# Patient Record
Sex: MALE | Race: BLACK OR AFRICAN AMERICAN | Employment: OTHER | URBAN - METROPOLITAN AREA
[De-identification: names, ages, dates, MRNs, and addresses within clinical notes are randomized per-mention and may not be internally consistent; named-entity substitution may affect disease eponyms.]

---

## 2017-06-03 ENCOUNTER — HOSPITAL ENCOUNTER (EMERGENCY)
Age: 67
Discharge: HOME | End: 2017-06-03
Attending: EMERGENCY MEDICINE
Payer: MEDICARE

## 2017-06-03 ENCOUNTER — APPOINTMENT (OUTPATIENT)
Dept: GENERAL RADIOLOGY | Age: 67
End: 2017-06-03
Attending: EMERGENCY MEDICINE
Payer: MEDICARE

## 2017-06-03 VITALS
BODY MASS INDEX: 34.13 KG/M2 | HEART RATE: 74 BPM | DIASTOLIC BLOOD PRESSURE: 55 MMHG | OXYGEN SATURATION: 94 % | SYSTOLIC BLOOD PRESSURE: 109 MMHG | RESPIRATION RATE: 16 BRPM | WEIGHT: 252 LBS | HEIGHT: 72 IN | TEMPERATURE: 98.3 F

## 2017-06-03 DIAGNOSIS — R07.89 CHEST WALL PAIN: Primary | ICD-10-CM

## 2017-06-03 LAB
ANION GAP BLD CALC-SCNC: 12 MMOL/L (ref 7–16)
BASOPHILS # BLD AUTO: 0 K/UL (ref 0–0.2)
BASOPHILS # BLD: 0 % (ref 0–2)
BUN SERPL-MCNC: 18 MG/DL (ref 8–23)
CALCIUM SERPL-MCNC: 9.1 MG/DL (ref 8.3–10.4)
CHLORIDE SERPL-SCNC: 99 MMOL/L (ref 98–107)
CO2 SERPL-SCNC: 27 MMOL/L (ref 21–32)
CREAT SERPL-MCNC: 1.31 MG/DL (ref 0.8–1.5)
DIFFERENTIAL METHOD BLD: ABNORMAL
EOSINOPHIL # BLD: 0.3 K/UL (ref 0–0.8)
EOSINOPHIL NFR BLD: 6 % (ref 0.5–7.8)
ERYTHROCYTE [DISTWIDTH] IN BLOOD BY AUTOMATED COUNT: 13.2 % (ref 11.9–14.6)
GLUCOSE SERPL-MCNC: 316 MG/DL (ref 65–100)
HCT VFR BLD AUTO: 41.5 % (ref 41.1–50.3)
HGB BLD-MCNC: 14.1 G/DL (ref 13.6–17.2)
IMM GRANULOCYTES # BLD: 0 K/UL (ref 0–0.5)
IMM GRANULOCYTES NFR BLD AUTO: 0.4 % (ref 0–5)
LYMPHOCYTES # BLD AUTO: 33 % (ref 13–44)
LYMPHOCYTES # BLD: 1.6 K/UL (ref 0.5–4.6)
MCH RBC QN AUTO: 28.7 PG (ref 26.1–32.9)
MCHC RBC AUTO-ENTMCNC: 34 G/DL (ref 31.4–35)
MCV RBC AUTO: 84.5 FL (ref 79.6–97.8)
MONOCYTES # BLD: 0.5 K/UL (ref 0.1–1.3)
MONOCYTES NFR BLD AUTO: 11 % (ref 4–12)
NEUTS SEG # BLD: 2.5 K/UL (ref 1.7–8.2)
NEUTS SEG NFR BLD AUTO: 50 % (ref 43–78)
PLATELET # BLD AUTO: 230 K/UL (ref 150–450)
PMV BLD AUTO: 9.9 FL (ref 10.8–14.1)
POTASSIUM SERPL-SCNC: 4.1 MMOL/L (ref 3.5–5.1)
RBC # BLD AUTO: 4.91 M/UL (ref 4.23–5.67)
SODIUM SERPL-SCNC: 138 MMOL/L (ref 136–145)
TROPONIN I BLD-MCNC: 0.02 NG/ML (ref 0–0.08)
TROPONIN I SERPL-MCNC: <0.04 NG/ML (ref 0.02–0.05)
WBC # BLD AUTO: 5 K/UL (ref 4.3–11.1)

## 2017-06-03 PROCEDURE — 84484 ASSAY OF TROPONIN QUANT: CPT

## 2017-06-03 PROCEDURE — 71020 XR CHEST PA LAT: CPT

## 2017-06-03 PROCEDURE — 80048 BASIC METABOLIC PNL TOTAL CA: CPT

## 2017-06-03 PROCEDURE — 93005 ELECTROCARDIOGRAM TRACING: CPT | Performed by: EMERGENCY MEDICINE

## 2017-06-03 PROCEDURE — 85025 COMPLETE CBC W/AUTO DIFF WBC: CPT

## 2017-06-03 PROCEDURE — 99284 EMERGENCY DEPT VISIT MOD MDM: CPT | Performed by: EMERGENCY MEDICINE

## 2017-06-03 RX ORDER — METFORMIN HYDROCHLORIDE 850 MG/1
850 TABLET ORAL 2 TIMES DAILY WITH MEALS
COMMUNITY

## 2017-06-03 NOTE — ED TRIAGE NOTES
Pt states that he has had right sided chest pain, intermittently for 1 week. States that he recently started lifting weights. Took ASA 324mg PTA.   Hx of CAD

## 2017-06-03 NOTE — ED PROVIDER NOTES
HPI Comments: 57-year-old male presents to the ER complaining of one week of brief episodes of sharp pain in his right chest it is often worse with movement and certain positions. He states he recently started lifting weights again. He denies any shortness of breath or diaphoresis. He denies any radiation of the pain. He states he did have a cardiac stent placed at [facility] many years ago but he does not follow-up with a cardiologist.  He did have routine appointment with his primary care physician a few days ago but he did not mention this pain to him. Patient is a 79 y.o. male presenting with chest pain. The history is provided by the patient and the spouse. Chest Pain (Angina)    This is a new problem. The current episode started more than 2 days ago. The problem has not changed since onset. The problem occurs daily. The pain is associated with movement. The pain is present in the right side. The pain is mild. The quality of the pain is described as sharp. The pain does not radiate. The symptoms are aggravated by movement. Pertinent negatives include no diaphoresis, no dizziness, no headaches, no malaise/fatigue, no nausea and no shortness of breath. He has tried nothing for the symptoms. Risk factors include cardiac disease, diabetes mellitus and hypertension. His past medical history is significant for DM and HTN. Procedural history includes cardiac catheterization and cardiac stents. Past Medical History:   Diagnosis Date    CAD (coronary artery disease)     GERD (gastroesophageal reflux disease)     HTN (hypertension)     Hypercholesteremia     Prediabetes     no meds       Past Surgical History:   Procedure Laterality Date    HX KNEE ARTHROSCOPY      to L    HX PTCA  2000    stents x 2    HX SHOULDER ARTHROSCOPY      to L         History reviewed. No pertinent family history.     Social History     Social History    Marital status:      Spouse name: N/A    Number of children: N/A    Years of education: N/A     Occupational History    Not on file. Social History Main Topics    Smoking status: Never Smoker    Smokeless tobacco: Not on file    Alcohol use No    Drug use: No    Sexual activity: Not on file     Other Topics Concern    Not on file     Social History Narrative         ALLERGIES: Review of patient's allergies indicates no known allergies. Review of Systems   Constitutional: Negative for diaphoresis and malaise/fatigue. HENT: Negative. Eyes: Negative. Respiratory: Negative for shortness of breath. Cardiovascular: Positive for chest pain. Gastrointestinal: Negative for nausea. Endocrine: Negative. Genitourinary: Negative. Musculoskeletal: Negative. Skin: Negative. Neurological: Negative for dizziness and headaches. Vitals:    06/03/17 1952   BP: 149/73   Pulse: 77   Resp: 16   Temp: 98.3 °F (36.8 °C)   SpO2: 95%   Weight: 114.3 kg (252 lb)   Height: 6' (1.829 m)            Physical Exam   Constitutional: He is oriented to person, place, and time. He appears well-developed and well-nourished. HENT:   Head: Normocephalic and atraumatic. Eyes: EOM are normal. Pupils are equal, round, and reactive to light. Neck: Normal range of motion. Neck supple. Cardiovascular: Normal rate and regular rhythm. Pulmonary/Chest: Effort normal and breath sounds normal. No respiratory distress. He exhibits no tenderness. Abdominal: Soft. Bowel sounds are normal.   Musculoskeletal: Normal range of motion. He exhibits no edema or tenderness. Neurological: He is alert and oriented to person, place, and time. Skin: Skin is warm and dry. Nursing note and vitals reviewed. MDM  Number of Diagnoses or Management Options  Diagnosis management comments: 71-year-old male presents with episodic right chest pain for the past one week.   No pain currently in the emergency department    Differential diagnosis includes chest wall pain, costochondritis, pneumothorax, angina, myocardial infarction, arrhythmia, reflux    EKG shows normal sinus rhythm with first-degree AV block. No acute ischemic changes       Amount and/or Complexity of Data Reviewed  Clinical lab tests: ordered and reviewed  Tests in the radiology section of CPT®: ordered and reviewed  Review and summarize past medical records: yes    Risk of Complications, Morbidity, and/or Mortality  Presenting problems: moderate  Diagnostic procedures: moderate  Management options: low    Patient Progress  Patient progress: stable    ED Course   9:01 PM  EKG shows no acute ischemic changes and a troponin value is negative. I see no sign of acute coronary syndrome. All of his episodes of pain are very brief and on the right side of his chest.  They have been occurring for at least 1 week. It is worse with movement and clearly sounds musculoskeletal.  He is reassured with this workup and will follow up with his doctor. Voice dictation software was used during the making of this note. This software is not perfect and grammatical and other typographical errors may be present. This note has been proofread, but may still contain errors.   Dashawn Estevez MD; 6/3/2017 @9:02 PM   ===================================================================        Procedures

## 2017-06-04 LAB
ATRIAL RATE: 61 BPM
CALCULATED P AXIS, ECG09: 56 DEGREES
CALCULATED R AXIS, ECG10: 60 DEGREES
CALCULATED T AXIS, ECG11: 95 DEGREES
DIAGNOSIS, 93000: NORMAL
P-R INTERVAL, ECG05: 228 MS
Q-T INTERVAL, ECG07: 402 MS
QRS DURATION, ECG06: 88 MS
QTC CALCULATION (BEZET), ECG08: 404 MS
VENTRICULAR RATE, ECG03: 61 BPM

## 2017-06-04 NOTE — ED NOTES
I have reviewed discharge instructions with the patient. The patient verbalized understanding.  Pt states that he feels better and is ready to go home

## 2017-06-04 NOTE — DISCHARGE INSTRUCTIONS
Musculoskeletal Chest Pain: Care Instructions  Your Care Instructions  Chest pain is not always a sign that something is wrong with your heart or that you have another serious problem. The doctor thinks your chest pain is caused by strained muscles or ligaments, inflamed chest cartilage, or another problem in your chest, rather than by your heart. You may need more tests to find the cause of your chest pain. Follow-up care is a key part of your treatment and safety. Be sure to make and go to all appointments, and call your doctor if you are having problems. Its also a good idea to know your test results and keep a list of the medicines you take. How can you care for yourself at home? · Take pain medicines exactly as directed. ¨ If the doctor gave you a prescription medicine for pain, take it as prescribed. ¨ If you are not taking a prescription pain medicine, ask your doctor if you can take an over-the-counter medicine. · Rest and protect the sore area. · Stop, change, or take a break from any activity that may be causing your pain or soreness. · Put ice or a cold pack on the sore area for 10 to 20 minutes at a time. Try to do this every 1 to 2 hours for the next 3 days (when you are awake) or until the swelling goes down. Put a thin cloth between the ice and your skin. · After 2 or 3 days, apply a heating pad set on low or a warm cloth to the area that hurts. Some doctors suggest that you go back and forth between hot and cold. · Do not wrap or tape your ribs for support. This may cause you to take smaller breaths, which could increase your risk of lung problems. · Mentholated creams such as Bengay or Icy Hot may soothe sore muscles. Follow the instructions on the package. · Follow your doctor's instructions for exercising. · Gentle stretching and massage may help you get better faster. Stretch slowly to the point just before pain begins, and hold the stretch for at least 15 to 30 seconds.  Do this 3 or 4 times a day. Stretch just after you have applied heat. · As your pain gets better, slowly return to your normal activities. Any increased pain may be a sign that you need to rest a while longer. When should you call for help? Call 911 anytime you think you may need emergency care. For example, call if:  · You have chest pain or pressure. This may occur with:  ¨ Sweating. ¨ Shortness of breath. ¨ Nausea or vomiting. ¨ Pain that spreads from the chest to the neck, jaw, or one or both shoulders or arms. ¨ Dizziness or lightheadedness. ¨ A fast or uneven pulse. After calling 911, chew 1 adult-strength aspirin. Wait for an ambulance. Do not try to drive yourself. · You have sudden chest pain and shortness of breath, or you cough up blood. Call your doctor now or seek immediate medical care if:  · You have any trouble breathing. · Your chest pain gets worse. · Your chest pain occurs consistently with exercise and is relieved by rest.  Watch closely for changes in your health, and be sure to contact your doctor if:  · Your chest pain does not get better after 1 week. Where can you learn more? Go to http://afua-kailey.info/. Enter V293 in the search box to learn more about \"Musculoskeletal Chest Pain: Care Instructions. \"  Current as of: May 27, 2016  Content Version: 11.2  © 7379-5617 [employer]. Care instructions adapted under license by [employer] (which disclaims liability or warranty for this information). If you have questions about a medical condition or this instruction, always ask your healthcare professional. [place] [address] any warranty or liability for your use of this information.

## 2021-09-23 PROCEDURE — B246ZZ4 ULTRASONOGRAPHY OF RIGHT AND LEFT HEART, TRANSESOPHAGEAL: ICD-10-PCS | Performed by: STUDENT IN AN ORGANIZED HEALTH CARE EDUCATION/TRAINING PROGRAM

## 2021-09-24 ENCOUNTER — APPOINTMENT (OUTPATIENT)
Dept: GENERAL RADIOLOGY | Age: 71
DRG: 233 | End: 2021-09-24
Attending: EMERGENCY MEDICINE
Payer: MEDICARE

## 2021-09-24 ENCOUNTER — APPOINTMENT (OUTPATIENT)
Dept: NON INVASIVE DIAGNOSTICS | Age: 71
DRG: 233 | End: 2021-09-24
Attending: NURSE PRACTITIONER
Payer: MEDICARE

## 2021-09-24 ENCOUNTER — HOSPITAL ENCOUNTER (INPATIENT)
Age: 71
LOS: 5 days | Discharge: HOME HEALTH | DRG: 233 | End: 2021-09-29
Attending: INTERNAL MEDICINE | Admitting: THORACIC SURGERY (CARDIOTHORACIC VASCULAR SURGERY)
Payer: MEDICARE

## 2021-09-24 ENCOUNTER — ANESTHESIA EVENT (OUTPATIENT)
Dept: SURGERY | Age: 71
DRG: 233 | End: 2021-09-24
Payer: MEDICARE

## 2021-09-24 ENCOUNTER — HOSPITAL ENCOUNTER (EMERGENCY)
Age: 71
Discharge: ANOTHER HOSPITAL | DRG: 233 | End: 2021-09-24
Attending: EMERGENCY MEDICINE
Payer: MEDICARE

## 2021-09-24 ENCOUNTER — APPOINTMENT (OUTPATIENT)
Dept: GENERAL RADIOLOGY | Age: 71
DRG: 233 | End: 2021-09-24
Attending: THORACIC SURGERY (CARDIOTHORACIC VASCULAR SURGERY)
Payer: MEDICARE

## 2021-09-24 ENCOUNTER — ANESTHESIA (OUTPATIENT)
Dept: SURGERY | Age: 71
DRG: 233 | End: 2021-09-24
Payer: MEDICARE

## 2021-09-24 VITALS
HEART RATE: 94 BPM | RESPIRATION RATE: 23 BRPM | TEMPERATURE: 99.1 F | DIASTOLIC BLOOD PRESSURE: 71 MMHG | WEIGHT: 250 LBS | SYSTOLIC BLOOD PRESSURE: 133 MMHG | BODY MASS INDEX: 33.86 KG/M2 | OXYGEN SATURATION: 97 % | HEIGHT: 72 IN

## 2021-09-24 DIAGNOSIS — J81.0 ACUTE PULMONARY EDEMA (HCC): ICD-10-CM

## 2021-09-24 DIAGNOSIS — I21.4 NON Q WAVE MYOCARDIAL INFARCTION (HCC): Primary | ICD-10-CM

## 2021-09-24 DIAGNOSIS — Z95.1 S/P CABG X 2: ICD-10-CM

## 2021-09-24 DIAGNOSIS — Z79.4 TYPE 2 DIABETES MELLITUS WITHOUT COMPLICATION, WITH LONG-TERM CURRENT USE OF INSULIN (HCC): Chronic | ICD-10-CM

## 2021-09-24 DIAGNOSIS — R09.02 HYPOXEMIA: ICD-10-CM

## 2021-09-24 DIAGNOSIS — Z99.11 ENCOUNTER FOR WEANING FROM VENTILATOR (HCC): ICD-10-CM

## 2021-09-24 DIAGNOSIS — I21.4 NSTEMI (NON-ST ELEVATED MYOCARDIAL INFARCTION) (HCC): ICD-10-CM

## 2021-09-24 DIAGNOSIS — E11.9 TYPE 2 DIABETES MELLITUS WITHOUT COMPLICATION, WITH LONG-TERM CURRENT USE OF INSULIN (HCC): Chronic | ICD-10-CM

## 2021-09-24 DIAGNOSIS — I10 ESSENTIAL HYPERTENSION: Chronic | ICD-10-CM

## 2021-09-24 PROBLEM — I25.10 CAD (CORONARY ARTERY DISEASE): Status: ACTIVE | Noted: 2021-09-24

## 2021-09-24 PROBLEM — I25.10 CORONARY ARTERY DISEASE INVOLVING NATIVE CORONARY ARTERY: Chronic | Status: ACTIVE | Noted: 2021-09-24

## 2021-09-24 LAB
ALBUMIN SERPL-MCNC: 3.7 G/DL (ref 3.2–4.6)
ALBUMIN/GLOB SERPL: 1 {RATIO} (ref 1.2–3.5)
ALP SERPL-CCNC: 106 U/L (ref 50–136)
ALT SERPL-CCNC: 30 U/L (ref 12–65)
ANION GAP SERPL CALC-SCNC: 8 MMOL/L (ref 7–16)
ANION GAP SERPL CALC-SCNC: 8 MMOL/L (ref 7–16)
APTT PPP: 29 SEC (ref 24.1–35.1)
APTT PPP: 29.4 SEC (ref 24.1–35.1)
ARTERIAL PATENCY WRIST A: ABNORMAL
ARTERIAL PATENCY WRIST A: NORMAL
AST SERPL-CCNC: 29 U/L (ref 15–37)
ATRIAL RATE: 97 BPM
BASE DEFICIT BLD-SCNC: 0.1 MMOL/L
BASE DEFICIT BLD-SCNC: 0.1 MMOL/L
BASE DEFICIT BLD-SCNC: 1.4 MMOL/L
BASE DEFICIT BLD-SCNC: 1.6 MMOL/L
BASE DEFICIT BLD-SCNC: 1.8 MMOL/L
BASE DEFICIT BLD-SCNC: 2.2 MMOL/L
BASE EXCESS BLD CALC-SCNC: 0.1 MMOL/L
BASOPHILS # BLD: 0 K/UL (ref 0–0.2)
BASOPHILS NFR BLD: 0 % (ref 0–2)
BDY SITE: ABNORMAL
BDY SITE: NORMAL
BILIRUB SERPL-MCNC: 0.4 MG/DL (ref 0.2–1.1)
BNP SERPL-MCNC: 526 PG/ML (ref 5–125)
BUN SERPL-MCNC: 13 MG/DL (ref 8–23)
BUN SERPL-MCNC: 18 MG/DL (ref 8–23)
CA-I BLD-MCNC: 1.12 MMOL/L (ref 1.12–1.32)
CA-I BLD-MCNC: 1.17 MMOL/L (ref 1.12–1.32)
CA-I BLD-MCNC: 1.18 MMOL/L (ref 1.12–1.32)
CA-I BLD-MCNC: 1.24 MMOL/L (ref 1.12–1.32)
CA-I BLD-MCNC: 1.32 MMOL/L (ref 1.12–1.32)
CALCIUM SERPL-MCNC: 8 MG/DL (ref 8.3–10.4)
CALCIUM SERPL-MCNC: 8.9 MG/DL (ref 8.3–10.4)
CALCULATED P AXIS, ECG09: 75 DEGREES
CALCULATED R AXIS, ECG10: 54 DEGREES
CALCULATED T AXIS, ECG11: -114 DEGREES
CHLORIDE SERPL-SCNC: 103 MMOL/L (ref 98–107)
CHLORIDE SERPL-SCNC: 114 MMOL/L (ref 98–107)
CHOLEST SERPL-MCNC: 92 MG/DL
CITRATED FUNCTIONAL FIBRINOGEN MAXIMUM AMPLITUDE: 16.2 MM (ref 15–32)
CITRATED FUNCTIONAL FIBRINOGEN MAXIMUM AMPLITUDE: 21.5 MM (ref 15–32)
CITRATED KAOLIN ANGLE: 72.5 DEG (ref 63–78)
CITRATED KAOLIN ANGLE: 76.4 DEG (ref 63–78)
CITRATED KAOLIN K-TIME: 1 MINS (ref 0.8–2.1)
CITRATED KAOLIN K-TIME: 1.5 MINS (ref 0.8–2.1)
CITRATED KAOLIN MAXIMUM AMPLITUDE: 54 MM (ref 52–69)
CITRATED KAOLIN MAXIMUM AMPLITUDE: 62.8 MM (ref 52–69)
CITRATED KAOLIN R-TIME: 5.3 MINS (ref 4.6–9.1)
CITRATED KAOLIN R-TIME: 6.2 MINS (ref 4.6–9.1)
CITRATED KAOLIN/HEPARINASE R-TIME: 5.2 MINS (ref 4.3–8.3)
CITRATED KAOLIN/HEPARINASE R-TIME: 6 MINS (ref 4.3–8.3)
CITRATED RAPIDTEG MAXIMUM AMPLITUDE: 51.8 MM (ref 52–70)
CITRATED RAPIDTEG MAXIMUM AMPLITUDE: 63.6 MM (ref 52–70)
CO2 BLD-SCNC: 24 MMOL/L (ref 13–23)
CO2 BLD-SCNC: 24 MMOL/L (ref 13–23)
CO2 BLD-SCNC: 25 MMOL/L (ref 13–23)
CO2 BLD-SCNC: 26 MMOL/L (ref 13–23)
CO2 BLD-SCNC: 26 MMOL/L (ref 13–23)
CO2 SERPL-SCNC: 25 MMOL/L (ref 21–32)
CO2 SERPL-SCNC: 27 MMOL/L (ref 21–32)
CREAT SERPL-MCNC: 0.76 MG/DL (ref 0.8–1.5)
CREAT SERPL-MCNC: 1.01 MG/DL (ref 0.8–1.5)
DIAGNOSIS, 93000: NORMAL
DIFFERENTIAL METHOD BLD: ABNORMAL
ECHO AO ASC DIAM: 3.6 CM
ECHO AO ROOT DIAM: 3.5 CM
ECHO AV AREA PEAK VELOCITY: 1.32 CM2
ECHO AV AREA VTI: 1.43 CM2
ECHO AV AREA/BSA PEAK VELOCITY: 0.6 CM2/M2
ECHO AV AREA/BSA VTI: 0.6 CM2/M2
ECHO AV MEAN GRADIENT: 11 MMHG
ECHO AV PEAK GRADIENT: 20 MMHG
ECHO AV PEAK VELOCITY: 222 CM/S
ECHO AV VTI: 44.7 CM
ECHO EST RA PRESSURE: 8 MMHG
ECHO LA AREA 2C: 28.6 CM2
ECHO LA AREA 4C: 33.5 CM2
ECHO LA MAJOR AXIS: 6.72 CM
ECHO LA MINOR AXIS: 2.87 CM
ECHO LV E' LATERAL VELOCITY: 8.9 CM/S
ECHO LV E' SEPTAL VELOCITY: 7.74 CM/S
ECHO LV EDV A2C: 182 CM3
ECHO LV EDV A4C: 189 CM3
ECHO LV ESV A2C: 91.5 CM3
ECHO LV ESV A4C: 93.5 CM3
ECHO LV INTERNAL DIMENSION DIASTOLIC: 5.17 CM (ref 4.2–5.9)
ECHO LV INTERNAL DIMENSION SYSTOLIC: 3.38 CM
ECHO LV IVSD: 1.1 CM (ref 0.6–1)
ECHO LV MASS 2D: 214.6 G (ref 88–224)
ECHO LV MASS INDEX 2D: 91.7 G/M2 (ref 49–115)
ECHO LV POSTERIOR WALL DIASTOLIC: 1.07 CM (ref 0.6–1)
ECHO LVOT DIAM: 1.9 CM
ECHO LVOT PEAK GRADIENT: 4 MMHG
ECHO LVOT VTI: 22.5 CM
ECHO MV A VELOCITY: 52.9 CM/S
ECHO MV E VELOCITY: 89.7 CM/S
ECHO MV E/A RATIO: 1.7
ECHO MV E/E' LATERAL: 10.08
ECHO MV E/E' RATIO (AVERAGED): 10.83
ECHO MV E/E' SEPTAL: 11.59
ECHO MV REGURGITANT RADIUS PISA: 0.3 CM
ECHO MV REGURGITANT VTIA: 148 CM
ECHO RIGHT VENTRICULAR SYSTOLIC PRESSURE (RVSP): 44 MMHG
ECHO RV TAPSE: 2.49 CM (ref 1.5–2)
ECHO TV REGURGITANT MAX VELOCITY: 299 CM/S
ECHO TV REGURGITANT PEAK GRADIENT: 36 MMHG
EOSINOPHIL # BLD: 0.2 K/UL (ref 0–0.8)
EOSINOPHIL NFR BLD: 4 % (ref 0.5–7.8)
ERYTHROCYTE [DISTWIDTH] IN BLOOD BY AUTOMATED COUNT: 13.9 % (ref 11.9–14.6)
ERYTHROCYTE [DISTWIDTH] IN BLOOD BY AUTOMATED COUNT: 14.1 % (ref 11.9–14.6)
EST. AVERAGE GLUCOSE BLD GHB EST-MCNC: 214 MG/DL
FIBRINOGEN PPP-MCNC: 237 MG/DL (ref 190–501)
FUNCTIONAL FIBRINOGEN LEVEL: 295.6 MG/DL (ref 278–581)
FUNCTIONAL FIBRINOGEN LEVEL: 392.3 MG/DL (ref 278–581)
GAS FLOW.O2 O2 DELIVERY SYS: ABNORMAL L/MIN
GAS FLOW.O2 O2 DELIVERY SYS: NORMAL L/MIN
GLOBULIN SER CALC-MCNC: 3.7 G/DL (ref 2.3–3.5)
GLUCOSE BLD STRIP.AUTO-MCNC: 133 MG/DL (ref 65–100)
GLUCOSE BLD STRIP.AUTO-MCNC: 134 MG/DL (ref 65–100)
GLUCOSE BLD STRIP.AUTO-MCNC: 138 MG/DL (ref 65–100)
GLUCOSE BLD STRIP.AUTO-MCNC: 151 MG/DL (ref 65–100)
GLUCOSE BLD STRIP.AUTO-MCNC: 152 MG/DL (ref 65–100)
GLUCOSE BLD STRIP.AUTO-MCNC: 156 MG/DL (ref 65–100)
GLUCOSE BLD STRIP.AUTO-MCNC: 157 MG/DL (ref 65–100)
GLUCOSE BLD STRIP.AUTO-MCNC: 159 MG/DL (ref 65–100)
GLUCOSE BLD STRIP.AUTO-MCNC: 170 MG/DL (ref 65–100)
GLUCOSE BLD STRIP.AUTO-MCNC: 181 MG/DL (ref 65–100)
GLUCOSE BLD STRIP.AUTO-MCNC: 186 MG/DL (ref 65–100)
GLUCOSE BLD STRIP.AUTO-MCNC: 191 MG/DL (ref 65–100)
GLUCOSE BLD STRIP.AUTO-MCNC: 246 MG/DL (ref 65–100)
GLUCOSE SERPL-MCNC: 161 MG/DL (ref 65–100)
GLUCOSE SERPL-MCNC: 257 MG/DL (ref 65–100)
HBA1C MFR BLD: 9.1 % (ref 4.2–6.3)
HCO3 BLD-SCNC: 22.8 MMOL/L (ref 22–26)
HCO3 BLD-SCNC: 23.5 MMOL/L (ref 22–26)
HCO3 BLD-SCNC: 23.8 MMOL/L (ref 22–26)
HCO3 BLD-SCNC: 24.6 MMOL/L (ref 22–26)
HCO3 BLD-SCNC: 24.6 MMOL/L (ref 22–26)
HCO3 BLD-SCNC: 25 MMOL/L (ref 22–26)
HCO3 BLD-SCNC: 25.5 MMOL/L (ref 22–26)
HCT VFR BLD AUTO: 31.9 % (ref 41.1–50.3)
HCT VFR BLD AUTO: 32.1 % (ref 41.1–50.3)
HCT VFR BLD AUTO: 36.1 % (ref 41.1–50.3)
HDLC SERPL-MCNC: 33 MG/DL (ref 40–60)
HDLC SERPL: 2.8 {RATIO}
HGB BLD-MCNC: 10.5 G/DL (ref 13.6–17.2)
HGB BLD-MCNC: 10.6 G/DL (ref 13.6–17.2)
HGB BLD-MCNC: 12.2 G/DL (ref 13.6–17.2)
HISTORY CHECKED?,CKHIST: NORMAL
IMM GRANULOCYTES # BLD AUTO: 0 K/UL (ref 0–0.5)
IMM GRANULOCYTES NFR BLD AUTO: 0 % (ref 0–5)
INR PPP: 0.9
INR PPP: 1.3
LDLC SERPL CALC-MCNC: 35.6 MG/DL
LIPASE SERPL-CCNC: 115 U/L (ref 73–393)
LYMPHOCYTES # BLD: 1.1 K/UL (ref 0.5–4.6)
LYMPHOCYTES NFR BLD: 21 % (ref 13–44)
MAGNESIUM SERPL-MCNC: 1.9 MG/DL (ref 1.8–2.4)
MAGNESIUM SERPL-MCNC: 2.5 MG/DL (ref 1.8–2.4)
MAGNESIUM SERPL-MCNC: 2.7 MG/DL (ref 1.8–2.4)
MCH RBC QN AUTO: 29.2 PG (ref 26.1–32.9)
MCH RBC QN AUTO: 29.3 PG (ref 26.1–32.9)
MCHC RBC AUTO-ENTMCNC: 32.9 G/DL (ref 31.4–35)
MCHC RBC AUTO-ENTMCNC: 33.8 G/DL (ref 31.4–35)
MCV RBC AUTO: 86.8 FL (ref 79.6–97.8)
MCV RBC AUTO: 88.9 FL (ref 79.6–97.8)
MONOCYTES # BLD: 0.7 K/UL (ref 0.1–1.3)
MONOCYTES NFR BLD: 12 % (ref 4–12)
NEUTS SEG # BLD: 3.4 K/UL (ref 1.7–8.2)
NEUTS SEG NFR BLD: 63 % (ref 43–78)
NRBC # BLD: 0 K/UL (ref 0–0.2)
NRBC # BLD: 0 K/UL (ref 0–0.2)
O2/TOTAL GAS SETTING VFR VENT: 30 %
O2/TOTAL GAS SETTING VFR VENT: 60 %
P-R INTERVAL, ECG05: 216 MS
PCO2 BLD: 38.9 MMHG (ref 35–45)
PCO2 BLD: 38.9 MMHG (ref 35–45)
PCO2 BLD: 40 MMHG (ref 35–45)
PCO2 BLD: 41.1 MMHG (ref 35–45)
PCO2 BLD: 42.2 MMHG (ref 35–45)
PCO2 BLD: 44.3 MMHG (ref 35–45)
PCO2 BLD: 48.7 MMHG (ref 35–45)
PEEP RESPIRATORY: 8 CMH2O
PEEP RESPIRATORY: 8 CMH2O
PH BLD: 7.31 [PH] (ref 7.35–7.45)
PH BLD: 7.37 [PH] (ref 7.35–7.45)
PH BLD: 7.37 [PH] (ref 7.35–7.45)
PH BLD: 7.38 [PH] (ref 7.35–7.45)
PH BLD: 7.41 [PH] (ref 7.35–7.45)
PLATELET # BLD AUTO: 117 K/UL (ref 150–450)
PLATELET # BLD AUTO: 199 K/UL (ref 150–450)
PMV BLD AUTO: 9.9 FL (ref 9.4–12.3)
PMV BLD AUTO: 9.9 FL (ref 9.4–12.3)
PO2 BLD: 219 MMHG (ref 75–100)
PO2 BLD: 310 MMHG (ref 75–100)
PO2 BLD: 342 MMHG (ref 75–100)
PO2 BLD: 377 MMHG (ref 75–100)
PO2 BLD: 85 MMHG (ref 75–100)
PO2 BLD: 85 MMHG (ref 75–100)
PO2 BLD: 90 MMHG (ref 75–100)
POTASSIUM BLD-SCNC: 3.6 MMOL/L (ref 3.5–5.1)
POTASSIUM BLD-SCNC: 3.9 MMOL/L (ref 3.5–5.1)
POTASSIUM BLD-SCNC: 4 MMOL/L (ref 3.5–5.1)
POTASSIUM BLD-SCNC: 4 MMOL/L (ref 3.5–5.1)
POTASSIUM BLD-SCNC: 4.5 MMOL/L (ref 3.5–5.1)
POTASSIUM SERPL-SCNC: 3.7 MMOL/L (ref 3.5–5.1)
POTASSIUM SERPL-SCNC: 3.9 MMOL/L (ref 3.5–5.1)
POTASSIUM SERPL-SCNC: 4.3 MMOL/L (ref 3.5–5.1)
PRESSURE SUPPORT SETTING VENT: 8 CMH2O
PROT SERPL-MCNC: 7.4 G/DL (ref 6.3–8.2)
PROTHROMBIN TIME: 13.1 SEC (ref 12.6–14.5)
PROTHROMBIN TIME: 16.1 SEC (ref 12.6–14.5)
Q-T INTERVAL, ECG07: 336 MS
QRS DURATION, ECG06: 92 MS
QTC CALCULATION (BEZET), ECG08: 426 MS
RBC # BLD AUTO: 3.59 M/UL (ref 4.23–5.6)
RBC # BLD AUTO: 4.16 M/UL (ref 4.23–5.6)
SAO2 % BLD: 100 %
SAO2 % BLD: 95.3 % (ref 95–98)
SAO2 % BLD: 96.1 % (ref 95–98)
SAO2 % BLD: 97 %
SERVICE CMNT-IMP: ABNORMAL
SERVICE CMNT-IMP: NORMAL
SERVICE CMNT-IMP: NORMAL
SODIUM BLD-SCNC: 139 MMOL/L (ref 136–145)
SODIUM BLD-SCNC: 140 MMOL/L (ref 136–145)
SODIUM BLD-SCNC: 141 MMOL/L (ref 136–145)
SODIUM BLD-SCNC: 142 MMOL/L (ref 136–145)
SODIUM BLD-SCNC: 143 MMOL/L (ref 136–145)
SODIUM SERPL-SCNC: 138 MMOL/L (ref 136–145)
SODIUM SERPL-SCNC: 147 MMOL/L (ref 136–145)
SPECIMEN SITE: ABNORMAL
SPECIMEN TYPE: ABNORMAL
SPECIMEN TYPE: NORMAL
TRIGL SERPL-MCNC: 117 MG/DL (ref 35–150)
TROPONIN-HIGH SENSITIVITY: 1259.7 PG/ML (ref 0–14)
TROPONIN-HIGH SENSITIVITY: 1460.6 PG/ML (ref 0–14)
TROPONIN-HIGH SENSITIVITY: 1821.5 PG/ML (ref 0–14)
UFH PPP CHRO-ACNC: <0.1 IU/ML (ref 0.3–0.7)
VENTILATION MODE VENT: ABNORMAL
VENTILATION MODE VENT: NORMAL
VENTRICULAR RATE, ECG03: 97 BPM
VLDLC SERPL CALC-MCNC: 23.4 MG/DL (ref 6–23)
VT SETTING VENT: 450 ML
WBC # BLD AUTO: 5.4 K/UL (ref 4.3–11.1)
WBC # BLD AUTO: 9.6 K/UL (ref 4.3–11.1)

## 2021-09-24 PROCEDURE — 77030013794 HC KT TRNSDUC BLD EDWD -B: Performed by: INTERNAL MEDICINE

## 2021-09-24 PROCEDURE — C1729 CATH, DRAINAGE: HCPCS | Performed by: THORACIC SURGERY (CARDIOTHORACIC VASCULAR SURGERY)

## 2021-09-24 PROCEDURE — 85384 FIBRINOGEN ACTIVITY: CPT

## 2021-09-24 PROCEDURE — 82947 ASSAY GLUCOSE BLOOD QUANT: CPT

## 2021-09-24 PROCEDURE — 83735 ASSAY OF MAGNESIUM: CPT

## 2021-09-24 PROCEDURE — 77030010818: Performed by: THORACIC SURGERY (CARDIOTHORACIC VASCULAR SURGERY)

## 2021-09-24 PROCEDURE — 82962 GLUCOSE BLOOD TEST: CPT

## 2021-09-24 PROCEDURE — 99152 MOD SED SAME PHYS/QHP 5/>YRS: CPT | Performed by: INTERNAL MEDICINE

## 2021-09-24 PROCEDURE — C1894 INTRO/SHEATH, NON-LASER: HCPCS | Performed by: INTERNAL MEDICINE

## 2021-09-24 PROCEDURE — C1769 GUIDE WIRE: HCPCS | Performed by: THORACIC SURGERY (CARDIOTHORACIC VASCULAR SURGERY)

## 2021-09-24 PROCEDURE — 77030012390 HC DRN CHST BTL GTNG -B: Performed by: THORACIC SURGERY (CARDIOTHORACIC VASCULAR SURGERY)

## 2021-09-24 PROCEDURE — 86923 COMPATIBILITY TEST ELECTRIC: CPT

## 2021-09-24 PROCEDURE — 74011000258 HC RX REV CODE- 258: Performed by: THORACIC SURGERY (CARDIOTHORACIC VASCULAR SURGERY)

## 2021-09-24 PROCEDURE — C8929 TTE W OR WO FOL WCON,DOPPLER: HCPCS

## 2021-09-24 PROCEDURE — 85730 THROMBOPLASTIN TIME PARTIAL: CPT

## 2021-09-24 PROCEDURE — 77030018571 HC SUT PROL1 J&J -B: Performed by: THORACIC SURGERY (CARDIOTHORACIC VASCULAR SURGERY)

## 2021-09-24 PROCEDURE — 65620000000 HC RM CCU GENERAL

## 2021-09-24 PROCEDURE — 77030003010 HC SUT SURG STL J&J -B: Performed by: THORACIC SURGERY (CARDIOTHORACIC VASCULAR SURGERY)

## 2021-09-24 PROCEDURE — 021009W BYPASS CORONARY ARTERY, ONE ARTERY FROM AORTA WITH AUTOLOGOUS VENOUS TISSUE, OPEN APPROACH: ICD-10-PCS | Performed by: THORACIC SURGERY (CARDIOTHORACIC VASCULAR SURGERY)

## 2021-09-24 PROCEDURE — 03HY32Z INSERTION OF MONITORING DEVICE INTO UPPER ARTERY, PERCUTANEOUS APPROACH: ICD-10-PCS | Performed by: STUDENT IN AN ORGANIZED HEALTH CARE EDUCATION/TRAINING PROGRAM

## 2021-09-24 PROCEDURE — 93458 L HRT ARTERY/VENTRICLE ANGIO: CPT | Performed by: INTERNAL MEDICINE

## 2021-09-24 PROCEDURE — 74011000250 HC RX REV CODE- 250: Performed by: THORACIC SURGERY (CARDIOTHORACIC VASCULAR SURGERY)

## 2021-09-24 PROCEDURE — 71045 X-RAY EXAM CHEST 1 VIEW: CPT

## 2021-09-24 PROCEDURE — 77030018548 HC SUT ETHBND2 J&J -B: Performed by: THORACIC SURGERY (CARDIOTHORACIC VASCULAR SURGERY)

## 2021-09-24 PROCEDURE — 77030010813: Performed by: THORACIC SURGERY (CARDIOTHORACIC VASCULAR SURGERY)

## 2021-09-24 PROCEDURE — 77030013797 HC KT TRNSDUC PRSSR EDWD -A: Performed by: THORACIC SURGERY (CARDIOTHORACIC VASCULAR SURGERY)

## 2021-09-24 PROCEDURE — 93005 ELECTROCARDIOGRAM TRACING: CPT | Performed by: EMERGENCY MEDICINE

## 2021-09-24 PROCEDURE — 77030018729 HC ELECTRD DEFIB PAD CARD -B: Performed by: THORACIC SURGERY (CARDIOTHORACIC VASCULAR SURGERY)

## 2021-09-24 PROCEDURE — 74011250637 HC RX REV CODE- 250/637: Performed by: INTERNAL MEDICINE

## 2021-09-24 PROCEDURE — 77030039425 HC BLD LARYNG TRULITE DISP TELE -A: Performed by: STUDENT IN AN ORGANIZED HEALTH CARE EDUCATION/TRAINING PROGRAM

## 2021-09-24 PROCEDURE — 77030015766: Performed by: INTERNAL MEDICINE

## 2021-09-24 PROCEDURE — 82330 ASSAY OF CALCIUM: CPT

## 2021-09-24 PROCEDURE — 96375 TX/PRO/DX INJ NEW DRUG ADDON: CPT

## 2021-09-24 PROCEDURE — 74011250636 HC RX REV CODE- 250/636: Performed by: NURSE ANESTHETIST, CERTIFIED REGISTERED

## 2021-09-24 PROCEDURE — 80053 COMPREHEN METABOLIC PANEL: CPT

## 2021-09-24 PROCEDURE — 02100Z9 BYPASS CORONARY ARTERY, ONE ARTERY FROM LEFT INTERNAL MAMMARY, OPEN APPROACH: ICD-10-PCS | Performed by: THORACIC SURGERY (CARDIOTHORACIC VASCULAR SURGERY)

## 2021-09-24 PROCEDURE — 77030008771 HC TU NG SALEM SUMP -A: Performed by: STUDENT IN AN ORGANIZED HEALTH CARE EDUCATION/TRAINING PROGRAM

## 2021-09-24 PROCEDURE — 77030002987 HC SUT PROL J&J -B: Performed by: THORACIC SURGERY (CARDIOTHORACIC VASCULAR SURGERY)

## 2021-09-24 PROCEDURE — 85027 COMPLETE CBC AUTOMATED: CPT

## 2021-09-24 PROCEDURE — 77030034888 HC SUT PROL 2 J&J -B: Performed by: THORACIC SURGERY (CARDIOTHORACIC VASCULAR SURGERY)

## 2021-09-24 PROCEDURE — 74011636637 HC RX REV CODE- 636/637: Performed by: THORACIC SURGERY (CARDIOTHORACIC VASCULAR SURGERY)

## 2021-09-24 PROCEDURE — 74011250636 HC RX REV CODE- 250/636: Performed by: NURSE PRACTITIONER

## 2021-09-24 PROCEDURE — 83880 ASSAY OF NATRIURETIC PEPTIDE: CPT

## 2021-09-24 PROCEDURE — 77030002888 HC SUT CHRMC J&J -A: Performed by: INTERNAL MEDICINE

## 2021-09-24 PROCEDURE — 77030018834: Performed by: THORACIC SURGERY (CARDIOTHORACIC VASCULAR SURGERY)

## 2021-09-24 PROCEDURE — 77030022953: Performed by: THORACIC SURGERY (CARDIOTHORACIC VASCULAR SURGERY)

## 2021-09-24 PROCEDURE — P9047 ALBUMIN (HUMAN), 25%, 50ML: HCPCS

## 2021-09-24 PROCEDURE — 77030020751 HC FLTR TBNG TRNSFUS HAEM -A: Performed by: THORACIC SURGERY (CARDIOTHORACIC VASCULAR SURGERY)

## 2021-09-24 PROCEDURE — 86901 BLOOD TYPING SEROLOGIC RH(D): CPT

## 2021-09-24 PROCEDURE — 80048 BASIC METABOLIC PNL TOTAL CA: CPT

## 2021-09-24 PROCEDURE — 74011000250 HC RX REV CODE- 250: Performed by: NURSE ANESTHETIST, CERTIFIED REGISTERED

## 2021-09-24 PROCEDURE — 82803 BLOOD GASES ANY COMBINATION: CPT

## 2021-09-24 PROCEDURE — C1751 CATH, INF, PER/CENT/MIDLINE: HCPCS | Performed by: STUDENT IN AN ORGANIZED HEALTH CARE EDUCATION/TRAINING PROGRAM

## 2021-09-24 PROCEDURE — 77030002970 HC SUT PLEDG TELE -A: Performed by: THORACIC SURGERY (CARDIOTHORACIC VASCULAR SURGERY)

## 2021-09-24 PROCEDURE — 99223 1ST HOSP IP/OBS HIGH 75: CPT | Performed by: INTERNAL MEDICINE

## 2021-09-24 PROCEDURE — 85025 COMPLETE CBC W/AUTO DIFF WBC: CPT

## 2021-09-24 PROCEDURE — 77030025827 HC BG BLD DNR AUTLG MEDT -A: Performed by: THORACIC SURGERY (CARDIOTHORACIC VASCULAR SURGERY)

## 2021-09-24 PROCEDURE — 74011000250 HC RX REV CODE- 250: Performed by: INTERNAL MEDICINE

## 2021-09-24 PROCEDURE — 77030025646 HC AUTOTRNSFUS KT TERU -C: Performed by: THORACIC SURGERY (CARDIOTHORACIC VASCULAR SURGERY)

## 2021-09-24 PROCEDURE — 77030031139 HC SUT VCRL2 J&J -A: Performed by: THORACIC SURGERY (CARDIOTHORACIC VASCULAR SURGERY)

## 2021-09-24 PROCEDURE — 83690 ASSAY OF LIPASE: CPT

## 2021-09-24 PROCEDURE — 2709999900 HC NON-CHARGEABLE SUPPLY: Performed by: THORACIC SURGERY (CARDIOTHORACIC VASCULAR SURGERY)

## 2021-09-24 PROCEDURE — 74011250636 HC RX REV CODE- 250/636

## 2021-09-24 PROCEDURE — 77030013794 HC KT TRNSDUC BLD EDWD -B: Performed by: STUDENT IN AN ORGANIZED HEALTH CARE EDUCATION/TRAINING PROGRAM

## 2021-09-24 PROCEDURE — 77030018673: Performed by: THORACIC SURGERY (CARDIOTHORACIC VASCULAR SURGERY)

## 2021-09-24 PROCEDURE — 77030002986 HC SUT PROL J&J -A: Performed by: THORACIC SURGERY (CARDIOTHORACIC VASCULAR SURGERY)

## 2021-09-24 PROCEDURE — 77030013292 HC BOWL MX PRSM J&J -A: Performed by: STUDENT IN AN ORGANIZED HEALTH CARE EDUCATION/TRAINING PROGRAM

## 2021-09-24 PROCEDURE — 77030019908 HC STETH ESOPH SIMS -A: Performed by: STUDENT IN AN ORGANIZED HEALTH CARE EDUCATION/TRAINING PROGRAM

## 2021-09-24 PROCEDURE — 4A023N7 MEASUREMENT OF CARDIAC SAMPLING AND PRESSURE, LEFT HEART, PERCUTANEOUS APPROACH: ICD-10-PCS | Performed by: INTERNAL MEDICINE

## 2021-09-24 PROCEDURE — 77030030163 HC BN WAX J&J -A: Performed by: THORACIC SURGERY (CARDIOTHORACIC VASCULAR SURGERY)

## 2021-09-24 PROCEDURE — 77030018547 HC SUT ETHBND1 J&J -B: Performed by: THORACIC SURGERY (CARDIOTHORACIC VASCULAR SURGERY)

## 2021-09-24 PROCEDURE — 74011000636 HC RX REV CODE- 636: Performed by: INTERNAL MEDICINE

## 2021-09-24 PROCEDURE — 74011250636 HC RX REV CODE- 250/636: Performed by: EMERGENCY MEDICINE

## 2021-09-24 PROCEDURE — 77030013861 HC PNCH AORT CLNCUT QUES -B: Performed by: THORACIC SURGERY (CARDIOTHORACIC VASCULAR SURGERY)

## 2021-09-24 PROCEDURE — 74011636637 HC RX REV CODE- 636/637: Performed by: NURSE PRACTITIONER

## 2021-09-24 PROCEDURE — 77030016564 HC BLD STRNL SAW4 CNMD -B: Performed by: THORACIC SURGERY (CARDIOTHORACIC VASCULAR SURGERY)

## 2021-09-24 PROCEDURE — 76010000198 HC CV SURG 5 TO 5.5 HR INTENSV-TIER 1: Performed by: THORACIC SURGERY (CARDIOTHORACIC VASCULAR SURGERY)

## 2021-09-24 PROCEDURE — C1769 GUIDE WIRE: HCPCS | Performed by: INTERNAL MEDICINE

## 2021-09-24 PROCEDURE — 36600 WITHDRAWAL OF ARTERIAL BLOOD: CPT

## 2021-09-24 PROCEDURE — 96366 THER/PROPH/DIAG IV INF ADDON: CPT

## 2021-09-24 PROCEDURE — B2111ZZ FLUOROSCOPY OF MULTIPLE CORONARY ARTERIES USING LOW OSMOLAR CONTRAST: ICD-10-PCS | Performed by: INTERNAL MEDICINE

## 2021-09-24 PROCEDURE — 85610 PROTHROMBIN TIME: CPT

## 2021-09-24 PROCEDURE — 65610000006 HC RM INTENSIVE CARE

## 2021-09-24 PROCEDURE — 77030013839 HC OCCL INT FLRST BAXT -B: Performed by: THORACIC SURGERY (CARDIOTHORACIC VASCULAR SURGERY)

## 2021-09-24 PROCEDURE — 77030037088 HC TUBE ENDOTRACH ORAL NSL COVD-A: Performed by: STUDENT IN AN ORGANIZED HEALTH CARE EDUCATION/TRAINING PROGRAM

## 2021-09-24 PROCEDURE — 96365 THER/PROPH/DIAG IV INF INIT: CPT

## 2021-09-24 PROCEDURE — 74011250637 HC RX REV CODE- 250/637: Performed by: EMERGENCY MEDICINE

## 2021-09-24 PROCEDURE — 76060000041 HC ANESTHESIA 5 TO 5.5 HR: Performed by: THORACIC SURGERY (CARDIOTHORACIC VASCULAR SURGERY)

## 2021-09-24 PROCEDURE — 36415 COLL VENOUS BLD VENIPUNCTURE: CPT

## 2021-09-24 PROCEDURE — 77030022199 HC SYS HARV VESL GTNG -G1: Performed by: THORACIC SURGERY (CARDIOTHORACIC VASCULAR SURGERY)

## 2021-09-24 PROCEDURE — 77030020407 HC IV BLD WRMR ST 3M -A: Performed by: STUDENT IN AN ORGANIZED HEALTH CARE EDUCATION/TRAINING PROGRAM

## 2021-09-24 PROCEDURE — 94002 VENT MGMT INPAT INIT DAY: CPT

## 2021-09-24 PROCEDURE — 80061 LIPID PANEL: CPT

## 2021-09-24 PROCEDURE — 77030016699 HC CATH ANGI DX INFN1 CARD -A: Performed by: INTERNAL MEDICINE

## 2021-09-24 PROCEDURE — 74011000258 HC RX REV CODE- 258: Performed by: NURSE ANESTHETIST, CERTIFIED REGISTERED

## 2021-09-24 PROCEDURE — 93005 ELECTROCARDIOGRAM TRACING: CPT | Performed by: THORACIC SURGERY (CARDIOTHORACIC VASCULAR SURGERY)

## 2021-09-24 PROCEDURE — 86580 TB INTRADERMAL TEST: CPT | Performed by: THORACIC SURGERY (CARDIOTHORACIC VASCULAR SURGERY)

## 2021-09-24 PROCEDURE — 77030006690 HC BLD OPHTH BVR BD -B: Performed by: THORACIC SURGERY (CARDIOTHORACIC VASCULAR SURGERY)

## 2021-09-24 PROCEDURE — 02HV33Z INSERTION OF INFUSION DEVICE INTO SUPERIOR VENA CAVA, PERCUTANEOUS APPROACH: ICD-10-PCS | Performed by: STUDENT IN AN ORGANIZED HEALTH CARE EDUCATION/TRAINING PROGRAM

## 2021-09-24 PROCEDURE — 77030002520 HC INSRT CLMP LATIS STLTH AMR -B: Performed by: THORACIC SURGERY (CARDIOTHORACIC VASCULAR SURGERY)

## 2021-09-24 PROCEDURE — 85018 HEMOGLOBIN: CPT

## 2021-09-24 PROCEDURE — 74011000272 HC RX REV CODE- 272: Performed by: THORACIC SURGERY (CARDIOTHORACIC VASCULAR SURGERY)

## 2021-09-24 PROCEDURE — 84295 ASSAY OF SERUM SODIUM: CPT

## 2021-09-24 PROCEDURE — 74011636637 HC RX REV CODE- 636/637: Performed by: NURSE ANESTHETIST, CERTIFIED REGISTERED

## 2021-09-24 PROCEDURE — 74011250637 HC RX REV CODE- 250/637: Performed by: THORACIC SURGERY (CARDIOTHORACIC VASCULAR SURGERY)

## 2021-09-24 PROCEDURE — 84484 ASSAY OF TROPONIN QUANT: CPT

## 2021-09-24 PROCEDURE — 74011250636 HC RX REV CODE- 250/636: Performed by: INTERNAL MEDICINE

## 2021-09-24 PROCEDURE — 74011000250 HC RX REV CODE- 250

## 2021-09-24 PROCEDURE — 77030005518 HC CATH URETH FOL 2W BARD -B: Performed by: THORACIC SURGERY (CARDIOTHORACIC VASCULAR SURGERY)

## 2021-09-24 PROCEDURE — 77030010512 HC APPL CLP LIG J&J -C: Performed by: THORACIC SURGERY (CARDIOTHORACIC VASCULAR SURGERY)

## 2021-09-24 PROCEDURE — 77030006824 HC BLD SAW SAG CNMD -B: Performed by: THORACIC SURGERY (CARDIOTHORACIC VASCULAR SURGERY)

## 2021-09-24 PROCEDURE — 99285 EMERGENCY DEPT VISIT HI MDM: CPT

## 2021-09-24 PROCEDURE — 84132 ASSAY OF SERUM POTASSIUM: CPT

## 2021-09-24 PROCEDURE — 74011250636 HC RX REV CODE- 250/636: Performed by: THORACIC SURGERY (CARDIOTHORACIC VASCULAR SURGERY)

## 2021-09-24 PROCEDURE — 74011250637 HC RX REV CODE- 250/637: Performed by: NURSE PRACTITIONER

## 2021-09-24 PROCEDURE — 77030020230: Performed by: STUDENT IN AN ORGANIZED HEALTH CARE EDUCATION/TRAINING PROGRAM

## 2021-09-24 PROCEDURE — 06BQ4ZZ EXCISION OF LEFT SAPHENOUS VEIN, PERCUTANEOUS ENDOSCOPIC APPROACH: ICD-10-PCS | Performed by: THORACIC SURGERY (CARDIOTHORACIC VASCULAR SURGERY)

## 2021-09-24 PROCEDURE — 77030009355 HC CRDPLG DEL SET QUES -C: Performed by: THORACIC SURGERY (CARDIOTHORACIC VASCULAR SURGERY)

## 2021-09-24 PROCEDURE — 85520 HEPARIN ASSAY: CPT

## 2021-09-24 PROCEDURE — 77030011264 HC ELECTRD BLD EXT COVD -A: Performed by: THORACIC SURGERY (CARDIOTHORACIC VASCULAR SURGERY)

## 2021-09-24 PROCEDURE — 77030020751 HC FLTR TBNG TRNSFUS HAEM -A: Performed by: STUDENT IN AN ORGANIZED HEALTH CARE EDUCATION/TRAINING PROGRAM

## 2021-09-24 PROCEDURE — 77030002912 HC SUT ETHBND J&J -A: Performed by: THORACIC SURGERY (CARDIOTHORACIC VASCULAR SURGERY)

## 2021-09-24 PROCEDURE — 83036 HEMOGLOBIN GLYCOSYLATED A1C: CPT

## 2021-09-24 PROCEDURE — 85347 COAGULATION TIME ACTIVATED: CPT

## 2021-09-24 PROCEDURE — 77030002996 HC SUT SLK J&J -A: Performed by: THORACIC SURGERY (CARDIOTHORACIC VASCULAR SURGERY)

## 2021-09-24 PROCEDURE — 77030029997 HC DEV COM RDL R BND TELE -B: Performed by: INTERNAL MEDICINE

## 2021-09-24 RX ORDER — SODIUM CHLORIDE 0.9 % (FLUSH) 0.9 %
5-10 SYRINGE (ML) INJECTION EVERY 8 HOURS
Status: DISCONTINUED | OUTPATIENT
Start: 2021-09-24 | End: 2021-09-24 | Stop reason: HOSPADM

## 2021-09-24 RX ORDER — METFORMIN HYDROCHLORIDE 850 MG/1
850 TABLET ORAL 2 TIMES DAILY WITH MEALS
Status: DISCONTINUED | OUTPATIENT
Start: 2021-09-24 | End: 2021-09-29 | Stop reason: HOSPADM

## 2021-09-24 RX ORDER — SODIUM CHLORIDE 0.9 % (FLUSH) 0.9 %
5-40 SYRINGE (ML) INJECTION EVERY 8 HOURS
Status: DISCONTINUED | OUTPATIENT
Start: 2021-09-24 | End: 2021-09-25 | Stop reason: SDUPTHER

## 2021-09-24 RX ORDER — GUAIFENESIN 100 MG/5ML
81 LIQUID (ML) ORAL DAILY
Status: DISCONTINUED | OUTPATIENT
Start: 2021-09-25 | End: 2021-09-25 | Stop reason: SDUPTHER

## 2021-09-24 RX ORDER — SODIUM BICARBONATE 84 MG/ML
50 INJECTION, SOLUTION INTRAVENOUS AS NEEDED
Status: DISCONTINUED | OUTPATIENT
Start: 2021-09-24 | End: 2021-09-26

## 2021-09-24 RX ORDER — MIDAZOLAM HYDROCHLORIDE 1 MG/ML
INJECTION, SOLUTION INTRAMUSCULAR; INTRAVENOUS AS NEEDED
Status: DISCONTINUED | OUTPATIENT
Start: 2021-09-24 | End: 2021-09-24 | Stop reason: HOSPADM

## 2021-09-24 RX ORDER — CEFAZOLIN SODIUM/WATER 2 G/20 ML
2 SYRINGE (ML) INTRAVENOUS
Status: DISCONTINUED | OUTPATIENT
Start: 2021-09-24 | End: 2021-09-24 | Stop reason: HOSPADM

## 2021-09-24 RX ORDER — ONDANSETRON 2 MG/ML
4 INJECTION INTRAMUSCULAR; INTRAVENOUS
Status: DISCONTINUED | OUTPATIENT
Start: 2021-09-24 | End: 2021-09-24

## 2021-09-24 RX ORDER — MUPIROCIN 20 MG/G
OINTMENT TOPICAL 2 TIMES DAILY
Status: DISCONTINUED | OUTPATIENT
Start: 2021-09-24 | End: 2021-09-24

## 2021-09-24 RX ORDER — NITROGLYCERIN 0.4 MG/1
0.4 TABLET SUBLINGUAL
Status: DISCONTINUED | OUTPATIENT
Start: 2021-09-24 | End: 2021-09-24

## 2021-09-24 RX ORDER — SODIUM CHLORIDE 9 MG/ML
50 INJECTION, SOLUTION INTRAVENOUS CONTINUOUS
Status: DISCONTINUED | OUTPATIENT
Start: 2021-09-24 | End: 2021-09-24

## 2021-09-24 RX ORDER — KETOROLAC TROMETHAMINE 15 MG/ML
15 INJECTION, SOLUTION INTRAMUSCULAR; INTRAVENOUS
Status: DISCONTINUED | OUTPATIENT
Start: 2021-09-25 | End: 2021-09-25

## 2021-09-24 RX ORDER — ROCURONIUM BROMIDE 10 MG/ML
INJECTION, SOLUTION INTRAVENOUS AS NEEDED
Status: DISCONTINUED | OUTPATIENT
Start: 2021-09-24 | End: 2021-09-24 | Stop reason: HOSPADM

## 2021-09-24 RX ORDER — OXYCODONE AND ACETAMINOPHEN 5; 325 MG/1; MG/1
1 TABLET ORAL
Status: DISCONTINUED | OUTPATIENT
Start: 2021-09-24 | End: 2021-09-25 | Stop reason: SDUPTHER

## 2021-09-24 RX ORDER — SODIUM CHLORIDE 9 MG/ML
INJECTION, SOLUTION INTRAVENOUS
Status: DISCONTINUED | OUTPATIENT
Start: 2021-09-24 | End: 2021-09-24 | Stop reason: HOSPADM

## 2021-09-24 RX ORDER — LISINOPRIL 20 MG/1
20 TABLET ORAL
Status: DISCONTINUED | OUTPATIENT
Start: 2021-09-24 | End: 2021-09-24

## 2021-09-24 RX ORDER — GLIMEPIRIDE 4 MG/1
4 TABLET ORAL 2 TIMES DAILY
COMMUNITY
Start: 2021-08-11 | End: 2021-09-29

## 2021-09-24 RX ORDER — METOPROLOL SUCCINATE 50 MG/1
50 TABLET, EXTENDED RELEASE ORAL
Status: DISCONTINUED | OUTPATIENT
Start: 2021-09-24 | End: 2021-09-24

## 2021-09-24 RX ORDER — SODIUM CHLORIDE 0.9 % (FLUSH) 0.9 %
5-40 SYRINGE (ML) INJECTION EVERY 8 HOURS
Status: DISCONTINUED | OUTPATIENT
Start: 2021-09-24 | End: 2021-09-24

## 2021-09-24 RX ORDER — CEFAZOLIN SODIUM/WATER 2 G/20 ML
2 SYRINGE (ML) INTRAVENOUS EVERY 8 HOURS
Status: COMPLETED | OUTPATIENT
Start: 2021-09-24 | End: 2021-09-25

## 2021-09-24 RX ORDER — MAGNESIUM SULFATE 1 G/100ML
1 INJECTION INTRAVENOUS AS NEEDED
Status: DISCONTINUED | OUTPATIENT
Start: 2021-09-24 | End: 2021-09-26

## 2021-09-24 RX ORDER — PROTAMINE SULFATE 10 MG/ML
INJECTION, SOLUTION INTRAVENOUS AS NEEDED
Status: DISCONTINUED | OUTPATIENT
Start: 2021-09-24 | End: 2021-09-24 | Stop reason: HOSPADM

## 2021-09-24 RX ORDER — SODIUM CHLORIDE 0.9 % (FLUSH) 0.9 %
5-40 SYRINGE (ML) INJECTION AS NEEDED
Status: DISCONTINUED | OUTPATIENT
Start: 2021-09-24 | End: 2021-09-24

## 2021-09-24 RX ORDER — KETOROLAC TROMETHAMINE 30 MG/ML
30 INJECTION, SOLUTION INTRAMUSCULAR; INTRAVENOUS
Status: COMPLETED | OUTPATIENT
Start: 2021-09-24 | End: 2021-09-24

## 2021-09-24 RX ORDER — LISINOPRIL 40 MG/1
40 TABLET ORAL DAILY
Status: ON HOLD | COMMUNITY
Start: 2021-08-11 | End: 2021-09-24

## 2021-09-24 RX ORDER — SODIUM CHLORIDE 0.9 % (FLUSH) 0.9 %
5-10 SYRINGE (ML) INJECTION AS NEEDED
Status: DISCONTINUED | OUTPATIENT
Start: 2021-09-24 | End: 2021-09-24 | Stop reason: HOSPADM

## 2021-09-24 RX ORDER — PANTOPRAZOLE SODIUM 40 MG/1
40 TABLET, DELAYED RELEASE ORAL
Status: DISCONTINUED | OUTPATIENT
Start: 2021-09-24 | End: 2021-09-24

## 2021-09-24 RX ORDER — INSULIN LISPRO 100 [IU]/ML
INJECTION, SOLUTION INTRAVENOUS; SUBCUTANEOUS
Status: DISCONTINUED | OUTPATIENT
Start: 2021-09-24 | End: 2021-09-24

## 2021-09-24 RX ORDER — LIDOCAINE HCL/PF 100 MG/5ML
50-100 SYRINGE (ML) INTRAVENOUS
Status: ACTIVE | OUTPATIENT
Start: 2021-09-24 | End: 2021-09-25

## 2021-09-24 RX ORDER — ROSUVASTATIN CALCIUM 20 MG/1
40 TABLET, COATED ORAL
Status: DISCONTINUED | OUTPATIENT
Start: 2021-09-24 | End: 2021-09-25 | Stop reason: SDUPTHER

## 2021-09-24 RX ORDER — NITROGLYCERIN 20 MG/100ML
INJECTION INTRAVENOUS
Status: DISCONTINUED | OUTPATIENT
Start: 2021-09-24 | End: 2021-09-24 | Stop reason: HOSPADM

## 2021-09-24 RX ORDER — HEPARIN SODIUM 5000 [USP'U]/100ML
12-25 INJECTION, SOLUTION INTRAVENOUS
Status: DISCONTINUED | OUTPATIENT
Start: 2021-09-24 | End: 2021-09-24

## 2021-09-24 RX ORDER — ETOMIDATE 2 MG/ML
INJECTION INTRAVENOUS AS NEEDED
Status: DISCONTINUED | OUTPATIENT
Start: 2021-09-24 | End: 2021-09-24 | Stop reason: HOSPADM

## 2021-09-24 RX ORDER — CEFAZOLIN SODIUM/WATER 2 G/20 ML
SYRINGE (ML) INTRAVENOUS AS NEEDED
Status: DISCONTINUED | OUTPATIENT
Start: 2021-09-24 | End: 2021-09-24 | Stop reason: HOSPADM

## 2021-09-24 RX ORDER — METOPROLOL SUCCINATE 50 MG/1
50 TABLET, EXTENDED RELEASE ORAL
COMMUNITY
Start: 2021-08-11 | End: 2021-11-09

## 2021-09-24 RX ORDER — ONDANSETRON 2 MG/ML
4 INJECTION INTRAMUSCULAR; INTRAVENOUS
Status: DISCONTINUED | OUTPATIENT
Start: 2021-09-24 | End: 2021-09-29 | Stop reason: HOSPADM

## 2021-09-24 RX ORDER — MAG HYDROX/ALUMINUM HYD/SIMETH 200-200-20
30 SUSPENSION, ORAL (FINAL DOSE FORM) ORAL
Status: DISCONTINUED | OUTPATIENT
Start: 2021-09-24 | End: 2021-09-24

## 2021-09-24 RX ORDER — VECURONIUM BROMIDE FOR INJECTION 1 MG/ML
INJECTION, POWDER, LYOPHILIZED, FOR SOLUTION INTRAVENOUS AS NEEDED
Status: DISCONTINUED | OUTPATIENT
Start: 2021-09-24 | End: 2021-09-24 | Stop reason: HOSPADM

## 2021-09-24 RX ORDER — SODIUM CHLORIDE 9 MG/ML
25 INJECTION, SOLUTION INTRAVENOUS CONTINUOUS
Status: DISCONTINUED | OUTPATIENT
Start: 2021-09-24 | End: 2021-09-26

## 2021-09-24 RX ORDER — NITROGLYCERIN 20 MG/100ML
0-20 INJECTION INTRAVENOUS
Status: DISCONTINUED | OUTPATIENT
Start: 2021-09-24 | End: 2021-09-26

## 2021-09-24 RX ORDER — FLUTICASONE PROPIONATE 50 MCG
2 SPRAY, SUSPENSION (ML) NASAL DAILY
Status: DISCONTINUED | OUTPATIENT
Start: 2021-09-24 | End: 2021-09-24

## 2021-09-24 RX ORDER — POTASSIUM CHLORIDE 14.9 MG/ML
10 INJECTION INTRAVENOUS AS NEEDED
Status: ACTIVE | OUTPATIENT
Start: 2021-09-24 | End: 2021-09-25

## 2021-09-24 RX ORDER — HEPARIN SODIUM 200 [USP'U]/100ML
INJECTION, SOLUTION INTRAVENOUS
Status: COMPLETED | OUTPATIENT
Start: 2021-09-24 | End: 2021-09-24

## 2021-09-24 RX ORDER — AMIODARONE HYDROCHLORIDE 200 MG/1
200 TABLET ORAL EVERY 12 HOURS
Status: DISCONTINUED | OUTPATIENT
Start: 2021-09-24 | End: 2021-09-25 | Stop reason: SDUPTHER

## 2021-09-24 RX ORDER — ROSUVASTATIN CALCIUM 20 MG/1
20 TABLET, COATED ORAL
Status: ON HOLD | COMMUNITY
Start: 2021-08-11 | End: 2021-09-24

## 2021-09-24 RX ORDER — MIDAZOLAM HYDROCHLORIDE 1 MG/ML
1 INJECTION, SOLUTION INTRAMUSCULAR; INTRAVENOUS
Status: DISCONTINUED | OUTPATIENT
Start: 2021-09-24 | End: 2021-09-25 | Stop reason: HOSPADM

## 2021-09-24 RX ORDER — MORPHINE SULFATE 4 MG/ML
3-5 INJECTION INTRAVENOUS
Status: DISCONTINUED | OUTPATIENT
Start: 2021-09-24 | End: 2021-09-26

## 2021-09-24 RX ORDER — PAPAVERINE HYDROCHLORIDE 30 MG/ML
INJECTION INTRAMUSCULAR; INTRAVENOUS AS NEEDED
Status: DISCONTINUED | OUTPATIENT
Start: 2021-09-24 | End: 2021-09-24 | Stop reason: HOSPADM

## 2021-09-24 RX ORDER — SODIUM CHLORIDE, SODIUM LACTATE, POTASSIUM CHLORIDE, CALCIUM CHLORIDE 600; 310; 30; 20 MG/100ML; MG/100ML; MG/100ML; MG/100ML
INJECTION, SOLUTION INTRAVENOUS
Status: DISCONTINUED | OUTPATIENT
Start: 2021-09-24 | End: 2021-09-24 | Stop reason: HOSPADM

## 2021-09-24 RX ORDER — CHLORHEXIDINE GLUCONATE 1.2 MG/ML
10 RINSE ORAL 2 TIMES DAILY
Status: DISCONTINUED | OUTPATIENT
Start: 2021-09-24 | End: 2021-09-25

## 2021-09-24 RX ORDER — INSULIN GLARGINE 100 [IU]/ML
50 INJECTION, SOLUTION SUBCUTANEOUS ONCE
Status: COMPLETED | OUTPATIENT
Start: 2021-09-25 | End: 2021-09-24

## 2021-09-24 RX ORDER — GUAIFENESIN 100 MG/5ML
81 LIQUID (ML) ORAL
Status: COMPLETED | OUTPATIENT
Start: 2021-09-24 | End: 2021-09-24

## 2021-09-24 RX ORDER — DEXTROSE, SODIUM CHLORIDE, AND POTASSIUM CHLORIDE 5; .45; .15 G/100ML; G/100ML; G/100ML
25 INJECTION INTRAVENOUS CONTINUOUS
Status: DISCONTINUED | OUTPATIENT
Start: 2021-09-24 | End: 2021-09-26

## 2021-09-24 RX ORDER — HEPARIN SODIUM 5000 [USP'U]/ML
60 INJECTION, SOLUTION INTRAVENOUS; SUBCUTANEOUS ONCE
Status: COMPLETED | OUTPATIENT
Start: 2021-09-24 | End: 2021-09-24

## 2021-09-24 RX ORDER — LIDOCAINE HYDROCHLORIDE 10 MG/ML
INJECTION INFILTRATION; PERINEURAL AS NEEDED
Status: DISCONTINUED | OUTPATIENT
Start: 2021-09-24 | End: 2021-09-24 | Stop reason: HOSPADM

## 2021-09-24 RX ORDER — NALOXONE HYDROCHLORIDE 0.4 MG/ML
0.4 INJECTION, SOLUTION INTRAMUSCULAR; INTRAVENOUS; SUBCUTANEOUS AS NEEDED
Status: DISCONTINUED | OUTPATIENT
Start: 2021-09-24 | End: 2021-09-29 | Stop reason: HOSPADM

## 2021-09-24 RX ORDER — MORPHINE SULFATE 2 MG/ML
2 INJECTION, SOLUTION INTRAMUSCULAR; INTRAVENOUS
Status: DISCONTINUED | OUTPATIENT
Start: 2021-09-24 | End: 2021-09-24

## 2021-09-24 RX ORDER — METOPROLOL TARTRATE 25 MG/1
25 TABLET, FILM COATED ORAL EVERY 12 HOURS
Status: DISPENSED | OUTPATIENT
Start: 2021-09-25 | End: 2021-09-29

## 2021-09-24 RX ORDER — SODIUM CHLORIDE 0.9 % (FLUSH) 0.9 %
5-40 SYRINGE (ML) INJECTION AS NEEDED
Status: DISCONTINUED | OUTPATIENT
Start: 2021-09-24 | End: 2021-09-25 | Stop reason: SDUPTHER

## 2021-09-24 RX ORDER — ASPIRIN 81 MG/1
81 TABLET ORAL
Status: DISCONTINUED | OUTPATIENT
Start: 2021-09-24 | End: 2021-09-24

## 2021-09-24 RX ORDER — LIDOCAINE HYDROCHLORIDE 20 MG/ML
INJECTION, SOLUTION EPIDURAL; INFILTRATION; INTRACAUDAL; PERINEURAL AS NEEDED
Status: DISCONTINUED | OUTPATIENT
Start: 2021-09-24 | End: 2021-09-24 | Stop reason: HOSPADM

## 2021-09-24 RX ORDER — ACETAMINOPHEN 325 MG/1
650 TABLET ORAL
Status: DISCONTINUED | OUTPATIENT
Start: 2021-09-24 | End: 2021-09-24

## 2021-09-24 RX ORDER — HEPARIN SODIUM 5000 [USP'U]/100ML
12-25 INJECTION, SOLUTION INTRAVENOUS
Status: DISCONTINUED | OUTPATIENT
Start: 2021-09-24 | End: 2021-09-24 | Stop reason: HOSPADM

## 2021-09-24 RX ORDER — GUAIFENESIN 100 MG/5ML
162 LIQUID (ML) ORAL
Status: COMPLETED | OUTPATIENT
Start: 2021-09-24 | End: 2021-09-24

## 2021-09-24 RX ORDER — HYDROCODONE BITARTRATE AND ACETAMINOPHEN 5; 325 MG/1; MG/1
1 TABLET ORAL
Status: DISCONTINUED | OUTPATIENT
Start: 2021-09-24 | End: 2021-09-24

## 2021-09-24 RX ORDER — FENTANYL CITRATE 50 UG/ML
INJECTION, SOLUTION INTRAMUSCULAR; INTRAVENOUS AS NEEDED
Status: DISCONTINUED | OUTPATIENT
Start: 2021-09-24 | End: 2021-09-24 | Stop reason: HOSPADM

## 2021-09-24 RX ORDER — GLIPIZIDE 5 MG/1
10 TABLET ORAL DAILY
Status: DISCONTINUED | OUTPATIENT
Start: 2021-09-24 | End: 2021-09-24

## 2021-09-24 RX ORDER — DEXTROSE 50 % IN WATER (D50W) INTRAVENOUS SYRINGE
25 AS NEEDED
Status: DISCONTINUED | OUTPATIENT
Start: 2021-09-24 | End: 2021-09-29 | Stop reason: HOSPADM

## 2021-09-24 RX ORDER — HEPARIN SODIUM 1000 [USP'U]/ML
INJECTION, SOLUTION INTRAVENOUS; SUBCUTANEOUS AS NEEDED
Status: DISCONTINUED | OUTPATIENT
Start: 2021-09-24 | End: 2021-09-24 | Stop reason: HOSPADM

## 2021-09-24 RX ADMIN — MIDAZOLAM 3 MG: 1 INJECTION INTRAMUSCULAR; INTRAVENOUS at 13:13

## 2021-09-24 RX ADMIN — NITROGLYCERIN 10 MCG/MIN: 200 INJECTION, SOLUTION INTRAVENOUS at 12:48

## 2021-09-24 RX ADMIN — VECURONIUM BROMIDE 5 MG: 1 INJECTION, POWDER, LYOPHILIZED, FOR SOLUTION INTRAVENOUS at 14:20

## 2021-09-24 RX ADMIN — HEPARIN SODIUM 5500 UNITS: 5000 INJECTION INTRAVENOUS; SUBCUTANEOUS at 03:13

## 2021-09-24 RX ADMIN — FENTANYL CITRATE 250 MCG: 50 INJECTION INTRAMUSCULAR; INTRAVENOUS at 13:22

## 2021-09-24 RX ADMIN — MIDAZOLAM 2 MG: 1 INJECTION INTRAMUSCULAR; INTRAVENOUS at 13:23

## 2021-09-24 RX ADMIN — CEFAZOLIN 2 G: 10 INJECTION, POWDER, FOR SOLUTION INTRAVENOUS at 23:12

## 2021-09-24 RX ADMIN — SODIUM CHLORIDE, SODIUM LACTATE, POTASSIUM CHLORIDE, CALCIUM CHLORIDE: 600; 310; 30; 20 INJECTION, SOLUTION INTRAVENOUS at 12:40

## 2021-09-24 RX ADMIN — TUBERCULIN PURIFIED PROTEIN DERIVATIVE 5 UNITS: 5 INJECTION, SOLUTION INTRADERMAL at 21:31

## 2021-09-24 RX ADMIN — ROCURONIUM BROMIDE 50 MG: 10 INJECTION, SOLUTION INTRAVENOUS at 15:31

## 2021-09-24 RX ADMIN — Medication 10 ML: at 06:44

## 2021-09-24 RX ADMIN — MIDAZOLAM 3 MG: 1 INJECTION INTRAMUSCULAR; INTRAVENOUS at 14:55

## 2021-09-24 RX ADMIN — PROTAMINE SULFATE 320 MG: 10 INJECTION, SOLUTION INTRAVENOUS at 16:04

## 2021-09-24 RX ADMIN — SODIUM CHLORIDE 5.5 UNITS/HR: 9 INJECTION, SOLUTION INTRAVENOUS at 21:24

## 2021-09-24 RX ADMIN — FENTANYL CITRATE 150 MCG: 50 INJECTION INTRAMUSCULAR; INTRAVENOUS at 13:24

## 2021-09-24 RX ADMIN — EPINEPHRINE 0.02 MCG/KG/MIN: 1 INJECTION INTRAMUSCULAR; INTRAVENOUS; SUBCUTANEOUS at 15:53

## 2021-09-24 RX ADMIN — PANTOPRAZOLE SODIUM 40 MG: 40 TABLET, DELAYED RELEASE ORAL at 07:53

## 2021-09-24 RX ADMIN — NITROGLYCERIN 1 INCH: 20 OINTMENT TOPICAL at 06:40

## 2021-09-24 RX ADMIN — DEXTROSE MONOHYDRATE 10 G: 5 INJECTION, SOLUTION INTRAVENOUS at 12:48

## 2021-09-24 RX ADMIN — Medication 1 AMPULE: at 23:12

## 2021-09-24 RX ADMIN — FAMOTIDINE 20 MG: 10 INJECTION INTRAVENOUS at 21:30

## 2021-09-24 RX ADMIN — PERFLUTREN 1 ML: 6.52 INJECTION, SUSPENSION INTRAVENOUS at 10:28

## 2021-09-24 RX ADMIN — CEFAZOLIN 2 G: 1 INJECTION, POWDER, FOR SOLUTION INTRAVENOUS at 12:48

## 2021-09-24 RX ADMIN — DEXMEDETOMIDINE 0.5 MCG/KG/HR: 100 INJECTION, SOLUTION, CONCENTRATE INTRAVENOUS at 16:18

## 2021-09-24 RX ADMIN — KETOROLAC TROMETHAMINE 30 MG: 30 INJECTION, SOLUTION INTRAMUSCULAR at 19:29

## 2021-09-24 RX ADMIN — FENTANYL CITRATE 100 MCG: 50 INJECTION INTRAMUSCULAR; INTRAVENOUS at 13:20

## 2021-09-24 RX ADMIN — SODIUM CHLORIDE 2 UNITS: 900 INJECTION, SOLUTION INTRAVENOUS at 15:41

## 2021-09-24 RX ADMIN — CEFAZOLIN 2 G: 1 INJECTION, POWDER, FOR SOLUTION INTRAVENOUS at 16:20

## 2021-09-24 RX ADMIN — INSULIN LISPRO 4 UNITS: 100 INJECTION, SOLUTION INTRAVENOUS; SUBCUTANEOUS at 07:38

## 2021-09-24 RX ADMIN — NITROGLYCERIN 1 INCH: 20 OINTMENT TOPICAL at 03:13

## 2021-09-24 RX ADMIN — VECURONIUM BROMIDE 5 MG: 1 INJECTION, POWDER, LYOPHILIZED, FOR SOLUTION INTRAVENOUS at 12:59

## 2021-09-24 RX ADMIN — FENTANYL CITRATE 400 MCG: 50 INJECTION INTRAMUSCULAR; INTRAVENOUS at 12:16

## 2021-09-24 RX ADMIN — MIDAZOLAM 3 MG: 1 INJECTION INTRAMUSCULAR; INTRAVENOUS at 15:31

## 2021-09-24 RX ADMIN — ASPIRIN 162 MG: 81 TABLET, CHEWABLE ORAL at 02:41

## 2021-09-24 RX ADMIN — SODIUM CHLORIDE, SODIUM LACTATE, POTASSIUM CHLORIDE, AND CALCIUM CHLORIDE: 600; 310; 30; 20 INJECTION, SOLUTION INTRAVENOUS at 12:07

## 2021-09-24 RX ADMIN — AMIODARONE HYDROCHLORIDE 200 MG: 200 TABLET ORAL at 21:30

## 2021-09-24 RX ADMIN — Medication 10 ML: at 17:10

## 2021-09-24 RX ADMIN — SODIUM CHLORIDE: 900 INJECTION, SOLUTION INTRAVENOUS at 16:40

## 2021-09-24 RX ADMIN — SODIUM CHLORIDE 1 G/HR: 900 INJECTION, SOLUTION INTRAVENOUS at 13:20

## 2021-09-24 RX ADMIN — ALUMINUM HYDROXIDE, MAGNESIUM HYDROXIDE, AND SIMETHICONE 30 ML: 200; 200; 20 SUSPENSION ORAL at 07:53

## 2021-09-24 RX ADMIN — ASPIRIN 81 MG: 81 TABLET, CHEWABLE ORAL at 07:53

## 2021-09-24 RX ADMIN — HEPARIN SODIUM 40000 UNITS: 1000 INJECTION, SOLUTION INTRAVENOUS; SUBCUTANEOUS at 14:25

## 2021-09-24 RX ADMIN — MIDAZOLAM 2 MG: 1 INJECTION INTRAMUSCULAR; INTRAVENOUS at 12:12

## 2021-09-24 RX ADMIN — CHLORHEXIDINE GLUCONATE 10 ML: 1.2 RINSE ORAL at 18:05

## 2021-09-24 RX ADMIN — PHENYLEPHRINE HYDROCHLORIDE 20 MCG/MIN: 10 INJECTION INTRAVENOUS at 16:14

## 2021-09-24 RX ADMIN — SODIUM CHLORIDE 25 ML/HR: 900 INJECTION, SOLUTION INTRAVENOUS at 18:00

## 2021-09-24 RX ADMIN — HEPARIN SODIUM 5000 UNITS: 1000 INJECTION, SOLUTION INTRAVENOUS; SUBCUTANEOUS at 14:40

## 2021-09-24 RX ADMIN — Medication 10 ML: at 21:31

## 2021-09-24 RX ADMIN — SODIUM CHLORIDE 50 ML/HR: 900 INJECTION, SOLUTION INTRAVENOUS at 06:44

## 2021-09-24 RX ADMIN — OXYCODONE HYDROCHLORIDE AND ACETAMINOPHEN 1 TABLET: 5; 325 TABLET ORAL at 23:28

## 2021-09-24 RX ADMIN — NITROGLYCERIN 10 MCG/MIN: 20 INJECTION INTRAVENOUS at 17:10

## 2021-09-24 RX ADMIN — POTASSIUM CHLORIDE, DEXTROSE MONOHYDRATE AND SODIUM CHLORIDE 25 ML/HR: 150; 5; 450 INJECTION, SOLUTION INTRAVENOUS at 19:03

## 2021-09-24 RX ADMIN — ETOMIDATE 18 MG: 2 INJECTION, SOLUTION INTRAVENOUS at 12:16

## 2021-09-24 RX ADMIN — ROSUVASTATIN CALCIUM 40 MG: 20 TABLET, COATED ORAL at 21:30

## 2021-09-24 RX ADMIN — MIDAZOLAM 2 MG: 1 INJECTION INTRAMUSCULAR; INTRAVENOUS at 16:19

## 2021-09-24 RX ADMIN — SODIUM CHLORIDE 3 UNITS/HR: 900 INJECTION, SOLUTION INTRAVENOUS at 13:13

## 2021-09-24 RX ADMIN — ROCURONIUM BROMIDE 50 MG: 10 INJECTION, SOLUTION INTRAVENOUS at 12:16

## 2021-09-24 RX ADMIN — INSULIN GLARGINE 50 UNITS: 100 INJECTION, SOLUTION SUBCUTANEOUS at 23:24

## 2021-09-24 RX ADMIN — HEPARIN SODIUM 12 UNITS/KG/HR: 5000 INJECTION, SOLUTION INTRAVENOUS at 03:13

## 2021-09-24 RX ADMIN — LIDOCAINE HYDROCHLORIDE 100 MG: 20 INJECTION, SOLUTION EPIDURAL; INFILTRATION; INTRACAUDAL; PERINEURAL at 12:16

## 2021-09-24 RX ADMIN — FENTANYL CITRATE 100 MCG: 50 INJECTION INTRAMUSCULAR; INTRAVENOUS at 14:37

## 2021-09-24 RX ADMIN — SODIUM CHLORIDE: 900 INJECTION, SOLUTION INTRAVENOUS at 12:40

## 2021-09-24 NOTE — ROUTINE PROCESS
TRANSFER - IN REPORT:    Verbal report received from FRANCHESKA Jaquez on Jevon Ferraro being received from  ER for routine progression of care      Report consisted of patients Situation, Background, Assessment and Recommendations(SBAR). Information from the following report(s) SBAR, Kardex, ED Summary, MAR, Recent Results and Cardiac Rhythm SR was reviewed with the receiving nurse. Opportunity for questions and clarification was provided. Assessment to be completed upon patients arrival to unit and care assumed.

## 2021-09-24 NOTE — ED NOTES
TRANSFER - OUT REPORT:    Verbal report given to Obie Crouch RN (name) on Lissa Guillermo  being transferred to [address] [place] [address] (unit) for routine progression of care       Report consisted of patients Situation, Background, Assessment and   Recommendations(SBAR). Information from the following report(s) SBAR was reviewed with the receiving nurse. Lines:   Peripheral IV 09/24/21 Left Antecubital (Active)   Site Assessment Clean, dry, & intact 09/24/21 0226   Phlebitis Assessment 0 09/24/21 0226   Infiltration Assessment 0 09/24/21 0226   Dressing Status Clean, dry, & intact 09/24/21 0226       Peripheral IV 09/24/21 Right Hand (Active)   Site Assessment Clean, dry, & intact 09/24/21 0316   Phlebitis Assessment 0 09/24/21 0316   Infiltration Assessment 0 09/24/21 0316   Dressing Status Clean, dry, & intact 09/24/21 0316        Opportunity for questions and clarification was provided.       Patient transported with:   [facility]

## 2021-09-24 NOTE — PROGRESS NOTES
Verbal bedside report given to [facility] and gordon Roberson RN. Patient's situation, background, assessment and recommendations provided. Opportunity for questions provided. Oncoming RN assumed care of patient. Heparin IV drip verified at bedside with oncoming RN.

## 2021-09-24 NOTE — CONSULTS
Cardiovascular ICU Consult Note: 9/24/2021  Damari Mccormack                                                     Admission Date: 9/24/2021     The patient's chart is reviewed and the patient is discussed with the staff. Subjective:     71 y/o male Patient is seen at the request of Dr. Marcin Mariscal for respiratory management status post cardiac surgery. Patient had CABG x2. Currently is sedated in CV-ICU and orally intubated receiving  mechanical ventilation. We have been asked to see in the CV-ICU for mechanical ventilation management and weaning. He had presented 9/24 with chest pain and was dx with nstemi. LHC today revelaed multivessel disease. Prior to Admission Medications   Prescriptions Last Dose Informant Patient Reported? Taking?   aspirin delayed-release 81 mg tablet 9/23/2021 at Unknown time  Yes Yes   Sig: Take 81 mg by mouth nightly. empagliflozin (JARDIANCE) 10 mg tablet 9/23/2021 at Unknown time  Yes Yes   Sig: Take 10 mg by mouth daily. glimepiride (AMARYL) 4 mg tablet 9/23/2021 at Unknown time  Yes Yes   Sig: Take 4 mg by mouth two (2) times a day. lisinopril (PRINIVIL, ZESTRIL) 20 mg tablet 9/23/2021 at Unknown time  Yes Yes   Sig: Take 20 mg by mouth nightly. metFORMIN (GLUCOPHAGE) 850 mg tablet 9/23/2021 at Unknown time  Yes Yes   Sig: Take 850 mg by mouth two (2) times daily (with meals). metoprolol succinate (TOPROL-XL) 50 mg XL tablet 9/23/2021 at Unknown time  Yes Yes   Sig: Take 50 mg by mouth nightly. rosuvastatin (CRESTOR) 20 mg tablet 9/23/2021 at Unknown time  Yes Yes   Sig: Take 20 mg by mouth nightly.       Facility-Administered Medications: None     Review of Systems: unable to determine due to intubated status  Past Medical History:   Diagnosis Date    CAD (coronary artery disease)     GERD (gastroesophageal reflux disease)     HTN (hypertension)     Hypercholesteremia     Prediabetes     no meds     Past Surgical History:   Procedure Laterality Date    HX KNEE ARTHROSCOPY      to L    HX PTCA  2000    stents x 2    HX SHOULDER ARTHROSCOPY      to L     Social History     Socioeconomic History    Marital status:      Spouse name: Not on file    Number of children: Not on file    Years of education: Not on file    Highest education level: Not on file   Occupational History    Not on file   Tobacco Use    Smoking status: Never Smoker   Substance and Sexual Activity    Alcohol use: No    Drug use: No    Sexual activity: Not on file   Other Topics Concern    Not on file   Social History Narrative    Not on file     Social Determinants of Health     Financial Resource Strain:     Difficulty of Paying Living Expenses:    Food Insecurity:     Worried About Running Out of Food in the Last Year:     Sven of Food in the Last Year:    Transportation Needs:     Lack of Transportation (Medical):  Lack of Transportation (Non-Medical):    Physical Activity:     Days of Exercise per Week:     Minutes of Exercise per Session:    Stress:     Feeling of Stress :    Social Connections:     Frequency of Communication with Friends and Family:     Frequency of Social Gatherings with Friends and Family:     Attends Baptism Services:     Active Member of Clubs or Organizations:     Attends Club or Organization Meetings:     Marital Status:    Intimate Partner Violence:     Fear of Current or Ex-Partner:     Emotionally Abused:     Physically Abused:     Sexually Abused:      No family history on file.   No Known Allergies  Current Facility-Administered Medications   Medication Dose Route Frequency    rosuvastatin (CRESTOR) tablet 40 mg  40 mg Oral QHS    metFORMIN (GLUCOPHAGE) tablet 850 mg  850 mg Oral BID WITH MEALS    0.9% sodium chloride infusion  25 mL/hr IntraVENous CONTINUOUS    dextrose 5% - 0.45% NaCl with KCl 20 mEq/L infusion  25 mL/hr IntraVENous CONTINUOUS    sodium chloride (NS) flush 5-40 mL  5-40 mL IntraVENous Q8H  sodium chloride (NS) flush 5-40 mL  5-40 mL IntraVENous PRN    oxyCODONE-acetaminophen (PERCOCET) 5-325 mg per tablet 1 Tablet  1 Tablet Oral Q4H PRN    morphine injection 3-5 mg  3-5 mg IntraVENous Q1H PRN    naloxone (NARCAN) injection 0.4 mg  0.4 mg IntraVENous PRN    mupirocin (BACTROBAN) 2 % ointment   Both Nostrils BID    ceFAZolin (ANCEF) 2 g/20 mL in sterile water IV syringe  2 g IntraVENous Q8H    sodium bicarbonate (8.4%) injection 50 mEq  50 mEq IntraVENous PRN    nitroglycerin (Tridil) 200 mcg/ml infusion  0-20 mcg/min IntraVENous TITRATE    lidocaine (PF) (XYLOCAINE) 100 mg/5 mL (2 %) injection syringe  mg   mg IntraVENous ONCE PRN    amiodarone (CORDARONE) tablet 200 mg  200 mg Oral Q12H    [START ON 9/25/2021] metoprolol tartrate (LOPRESSOR) tablet 25 mg  25 mg Oral Q12H    ondansetron (ZOFRAN) injection 4 mg  4 mg IntraVENous Q4H PRN    insulin regular (NOVOLIN R, HUMULIN R) 100 Units in 0.9% sodium chloride 100 mL infusion  1 Units/hr IntraVENous TITRATE    dextrose (D50W) injection syrg 12.5 g  25 mL IntraVENous PRN    [START ON 9/25/2021] aspirin chewable tablet 81 mg  81 mg Oral DAILY    magnesium sulfate 1 g/100 ml IVPB (premix or compounded)  1 g IntraVENous PRN    potassium chloride 10 mEq in 50 ml IVPB  10 mEq IntraVENous PRN    midazolam (VERSED) injection 1 mg  1 mg IntraVENous Q1H PRN    chlorhexidine (PERIDEX) 0.12 % mouthwash 10 mL  10 mL Oral BID    tuberculin injection 5 Units  5 Units IntraDERMal ONCE    famotidine (PF) (PEPCID) 20 mg in 0.9% sodium chloride 10 mL injection  20 mg IntraVENous Q12H    ketorolac (TORADOL) injection 30 mg  30 mg IntraVENous NOW    [START ON 9/25/2021] ketorolac (TORADOL) injection 15 mg  15 mg IntraVENous Q6H PRN     Objective:   Blood pressure (!) 117/55, pulse 74, temperature 97.5 °F (36.4 °C), resp. rate 24, height 6' (1.829 m), weight 249 lb (112.9 kg), SpO2 96 %.      Intake/Output Summary (Last 24 hours) at 9/24/2021 1919  Last data filed at 9/24/2021 1714  Gross per 24 hour   Intake 2350 ml   Output 1090 ml   Net 1260 ml     Physical Exam:          Constitutional:  Sedated, orally intubated and mechanically ventilated. EENMT:  Sclera clear, pupils equal, oral mucosa moist and orally intubated  Respiratory: clear  Cardiovascular:  RRR with no M,G,R;  Gastrointestinal:  soft; no bowel sounds present  Musculoskeletal:  warm with no cyanosis, no lower extremity edema. [place] site left leg with ace wrap. Sedated with no movements. SKIN:  no jaundice or ecchymosis   Neurologic:  sedated but no gross neuro deficits  Psychiatric:  sedated and unable to assess at this time    CXR:          LINES:  ETT, calderón, swan chato, arterial line, chest tubes times 2 in epigastric area without air leak. DRIPS:   Epinephrine Dose (mcg/min): 0 mcg/min  Phenylephrine Dose (mcg/min): 20 mcg/min  Precedex Dose (mcg/kg/hr): 0.5 mcg/kg/hr     CI:   2.2    Ventilator Settings  Mode FIO2 Rate Tidal Volume Pressure PEEP   VC+  60 %    450 ml     8 cm H20    Peak airway pressure: 23 cm H2O   Minute ventilation: 9.24 l/min   ABG:   Recent Labs     09/24/21  1737 09/24/21  1635 09/24/21  1537   PHI 7.31* 7.38 7.38   PCO2I 48.7* 38.9 40.0   PO2I 85 90 219*   HCO3I 24.6 22.8 23.5        Recent Labs     09/24/21  1735 09/24/21  0223 09/24/21 0222   WBC 9.6 5.4  --    HGB 10.5* 12.2*  --    HCT 31.9* 36.1*  --    * 199  --    INR 1.3  --  0.9     Recent Labs     09/24/21  1735 09/24/21 0223   * 138   K 3.9 3.7   * 103   CO2 25 27   * 257*   BUN 13 18   CREA 0.76* 1.01   MG 2.7* 1.9   CA 8.0* 8.9   ALB  --  3.7     No results for input(s): LCAD, LAC in the last 72 hours. Results from Hospital Encounter encounter on 09/24/21    ECHO ADULT COMPLETE    Interpretation Summary  · Echo study was technically difficulty. · LV: Estimated LVEF is 40 - 45%. Normal cavity size and diastolic function. Mild concentric hypertrophy.   · LA: Moderately dilated left atrium. · MV: Mild mitral valve regurgitation is present. · TV: Mild tricuspid valve regurgitation is present. Right Ventricular Arterial Pressure (RVSP) is 44 mmHg. · IVC: Moderately elevated central venous pressure (8 mmHg); IVC diameter is larger than 21 mm and collapses more than 50% with respiration. Assessment and Plan :  (Medical Decision Making)   Impression: 70 y.o. y/o male s/p CV surgery for NSTEMI (non-ST elevated myocardial infarction) ([facility] [address]    Encounter for weaning from Ventilator:  Post op ABG and CXR review. Continue with weaning per protocol. Appears to have pulmonary edema post op but cardiac numbers appear ok-optimize hemodynamics    Hypoxemia:   Once weaned from ventilator will work on IS, mobility and weaning O2. Bronchodilators per protocol. Atelectasis:   IS, mobility after extubated. S/P Cardiovascular surgery:  Monitor chest tube output, removal per surgery. More than 50% of the time documented was spent in face-to-face contact with the patient and in the care of the patient on the floor/unit where the patient is located. Thank you for this referral.  We appreciate the opportunity to participate in this patient's care. Will follow along with you.     Kenny Hernandez MD

## 2021-09-24 NOTE — H&P
[facility] Cardiology History & Physical      Date of  Admission: 9/24/2021  5:47 AM     Primary Care Physician: Dr. Pete Cueto  Primary Cardiologist: Dr. Janna Aguero Physician: Dr. Prasanth García    CC: chest pain     HPI:  Cee Hammer is a 70 y.o. male with past medical CAD with PCI in 2000, HTN, HLD, and DM2 who presented to the ER at [facility] with complaints of chest pain. Patient states that his pain started yesterday morning after working out at the gym. Describes his pain as intermittent tightness located in the center of his chest that last 10-15 minutes at a time. Denies shortness of breath, nausea, vomiting, or diaphoresis. Upon arrival to the ER, EKG shows SR with T wave inversion in inferolateral leads. Initial troponin was 1259.7. Patient was given ASA, topical nitrates and IV heparin prior to transfer downtown. He remains chest pain free.       Past Medical History:   Diagnosis Date    CAD (coronary artery disease)     GERD (gastroesophageal reflux disease)     HTN (hypertension)     Hypercholesteremia     Prediabetes     no meds      Past Surgical History:   Procedure Laterality Date    HX KNEE ARTHROSCOPY      to L    HX PTCA  2000    stents x 2    HX SHOULDER ARTHROSCOPY      to L       No Known Allergies   Social History     Socioeconomic History    Marital status:      Spouse name: Not on file    Number of children: Not on file    Years of education: Not on file    Highest education level: Not on file   Occupational History    Not on file   Tobacco Use    Smoking status: Never Smoker   Substance and Sexual Activity    Alcohol use: No    Drug use: No    Sexual activity: Not on file   Other Topics Concern    Not on file   Social History Narrative    Not on file     Social Determinants of Health     Financial Resource Strain:     Difficulty of Paying Living Expenses:    Food Insecurity:     Worried About Running Out of Food in the Last Year:     [address] in the Last Year:    Transportation Needs:     Lack of Transportation (Medical):  Lack of Transportation (Non-Medical):    Physical Activity:     Days of Exercise per Week:     Minutes of Exercise per Session:    Stress:     Feeling of Stress :    Social Connections:     Frequency of Communication with Friends and Family:     Frequency of Social Gatherings with Friends and Family:     Attends [employer] Services:     Active Member of Clubs or Organizations:     Attends Club or Organization Meetings:     Marital Status:    Intimate Partner Violence:     Fear of Current or Ex-Partner:     Emotionally Abused:     Physically Abused:     Sexually Abused:      No family history on file.      Current Facility-Administered Medications   Medication Dose Route Frequency    aspirin delayed-release tablet 81 mg  81 mg Oral QHS    empagliflozin (JARDIANCE) tablet 10 mg- pt supply (Patient Supplied)  10 mg Oral DAILY    lisinopriL (PRINIVIL, ZESTRIL) tablet 20 mg  20 mg Oral QHS    metoprolol succinate (TOPROL-XL) XL tablet 50 mg  50 mg Oral QHS    rosuvastatin (CRESTOR) tablet 40 mg  40 mg Oral QHS    glipiZIDE (GLUCOTROL) tablet 10 mg  10 mg Oral DAILY    sodium chloride (NS) flush 5-40 mL  5-40 mL IntraVENous Q8H    sodium chloride (NS) flush 5-40 mL  5-40 mL IntraVENous PRN    nitroglycerin (NITROBID) 2 % ointment 1 Inch  1 Inch Topical Q6H    nitroglycerin (NITROSTAT) tablet 0.4 mg  0.4 mg SubLINGual Q5MIN PRN    morphine injection 2 mg  2 mg IntraVENous Q4H PRN    acetaminophen (TYLENOL) tablet 650 mg  650 mg Oral Q4H PRN    HYDROcodone-acetaminophen (NORCO) 5-325 mg per tablet 1 Tablet  1 Tablet Oral Q4H PRN    ondansetron (ZOFRAN) injection 4 mg  4 mg IntraVENous Q4H PRN    0.9% sodium chloride infusion  50 mL/hr IntraVENous CONTINUOUS    insulin lispro (HUMALOG) injection   SubCUTAneous AC&HS    heparin 25,000 units in dextrose 500 mL infusion  12-25 Units/kg/hr IntraVENous TITRATE       Review of Systems    Review of Systems   Constitutional: Negative. HENT: Negative. Eyes: Negative. Respiratory: Negative. Cardiovascular: Positive for chest pain. Gastrointestinal: Negative. Genitourinary: Negative. Musculoskeletal: Negative. Skin: Negative. Neurological: Negative. Endo/Heme/Allergies: Negative. Psychiatric/Behavioral: Negative. Subjective:     Visit Vitals  /68   Pulse 92   Temp 98.6 °F (37 °C)   Resp 18   Ht 6' (1.829 m)   Wt 122.4 kg (269 lb 12.8 oz)   SpO2 97%   BMI 36.59 kg/m²     Physical Exam  HENT:      Mouth/Throat:      Mouth: Mucous membranes are moist.   Eyes:      Pupils: Pupils are equal, round, and reactive to light. Cardiovascular:      Rate and Rhythm: Normal rate and regular rhythm. Heart sounds: Normal heart sounds. Abdominal:      General: Bowel sounds are normal.   Musculoskeletal:         General: Swelling present. Comments: + mild BLE edema around ankles   Skin:     General: Skin is warm and dry. Neurological:      Mental Status: He is alert and oriented to person, place, and time.    Psychiatric:         Mood and Affect: Mood normal.         Cardiographics  Telemetry: normal sinus rhythm  ECG: normal sinus rhythm, inverted T waves in inferolateral leads  Echocardiogram: ordered    Labs:   Recent Results (from the past 24 hour(s))   EKG, 12 LEAD, INITIAL    Collection Time: 09/24/21  2:09 AM   Result Value Ref Range    Ventricular Rate 97 BPM    Atrial Rate 97 BPM    P-R Interval 216 ms    QRS Duration 92 ms    Q-T Interval 336 ms    QTC Calculation (Bezet) 426 ms    Calculated P Axis 75 degrees    Calculated R Axis 54 degrees    Calculated T Axis -114 degrees    Diagnosis       Sinus rhythm with 1st degree A-V block  ST & T wave abnormality, consider inferolateral ischemia  Abnormal ECG  When compared with ECG of 23-SEP-2021 18:13,  MS interval has increased  ST now depressed in Inferior leads  ST now depressed in Lateral leads  T wave inversion now evident in Inferior leads  T wave inversion now evident in Lateral leads     PROTHROMBIN TIME + INR    Collection Time: 09/24/21  2:22 AM   Result Value Ref Range    Prothrombin time 13.1 12.6 - 14.5 sec    INR 0.9     PTT    Collection Time: 09/24/21  2:22 AM   Result Value Ref Range    aPTT 29.0 24.1 - 35.1 SEC   TROPONIN-HIGH SENSITIVITY    Collection Time: 09/24/21  2:23 AM   Result Value Ref Range    Troponin-High Sensitivity 1,259.7 (HH) 0 - 14 pg/mL   CBC WITH AUTOMATED DIFF    Collection Time: 09/24/21  2:23 AM   Result Value Ref Range    WBC 5.4 4.3 - 11.1 K/uL    RBC 4.16 (L) 4.23 - 5.6 M/uL    HGB 12.2 (L) 13.6 - 17.2 g/dL    HCT 36.1 (L) 41.1 - 50.3 %    MCV 86.8 79.6 - 97.8 FL    MCH 29.3 26.1 - 32.9 PG    MCHC 33.8 31.4 - 35.0 g/dL    RDW 13.9 11.9 - 14.6 %    PLATELET 437 448 - 544 K/uL    MPV 9.9 9.4 - 12.3 FL    ABSOLUTE NRBC 0.00 0.0 - 0.2 K/uL    DF AUTOMATED      NEUTROPHILS 63 43 - 78 %    LYMPHOCYTES 21 13 - 44 %    MONOCYTES 12 4.0 - 12.0 %    EOSINOPHILS 4 0.5 - 7.8 %    BASOPHILS 0 0.0 - 2.0 %    IMMATURE GRANULOCYTES 0 0.0 - 5.0 %    ABS. NEUTROPHILS 3.4 1.7 - 8.2 K/UL    ABS. LYMPHOCYTES 1.1 0.5 - 4.6 K/UL    ABS. MONOCYTES 0.7 0.1 - 1.3 K/UL    ABS. EOSINOPHILS 0.2 0.0 - 0.8 K/UL    ABS. BASOPHILS 0.0 0.0 - 0.2 K/UL    ABS. IMM. GRANS. 0.0 0.0 - 0.5 K/UL   METABOLIC PANEL, COMPREHENSIVE    Collection Time: 09/24/21  2:23 AM   Result Value Ref Range    Sodium 138 136 - 145 mmol/L    Potassium 3.7 3.5 - 5.1 mmol/L    Chloride 103 98 - 107 mmol/L    CO2 27 21 - 32 mmol/L    Anion gap 8 7 - 16 mmol/L    Glucose 257 (H) 65 - 100 mg/dL    BUN 18 8 - 23 MG/DL    Creatinine 1.01 0.8 - 1.5 MG/DL    GFR est AA >60 >60 ml/min/1.73m2    GFR est non-AA >60 >60 ml/min/1.73m2    Calcium 8.9 8.3 - 10.4 MG/DL    Bilirubin, total 0.4 0.2 - 1.1 MG/DL    ALT (SGPT) 30 12 - 65 U/L    AST (SGOT) 29 15 - 37 U/L    Alk.  phosphatase 106 50 - 136 U/L    Protein, total 7.4 6.3 - 8.2 g/dL    Albumin 3.7 3.2 - 4.6 g/dL    Globulin 3.7 (H) 2.3 - 3.5 g/dL    A-G Ratio 1.0 (L) 1.2 - 3.5     LIPASE    Collection Time: 09/24/21  2:23 AM   Result Value Ref Range    Lipase 115 73 - 393 U/L   MAGNESIUM    Collection Time: 09/24/21  2:23 AM   Result Value Ref Range    Magnesium 1.9 1.8 - 2.4 mg/dL   NT-PRO BNP    Collection Time: 09/24/21  2:23 AM   Result Value Ref Range    NT pro- (H) 5 - 125 PG/ML   TROPONIN-HIGH SENSITIVITY    Collection Time: 09/24/21  4:05 AM   Result Value Ref Range    Troponin-High Sensitivity 1,460.6 (HH) 0 - 14 pg/mL       Patient has been seen and examined by Dr. Eduarda Zhou and he agrees with the following assessment and plan:     Assessment/Plan:        NSTEMI (non-ST elevated myocardial infarction) -- admit to telemetry. Topical nitro in place and IV heparin infusing. Continue ASA, BB, ACEi and high intensity statin. NPO now with IV hydration. Plan for C with possible PCI later today. Check echocardiogram and lipid panel. HTN (hypertension)  -- continue BB and ACEi. Monitor BP closely. Titrate medications as needed      DM2 (diabetes mellitus, type 2) -- uncontrolled. Continue home medications with the exception of metformin. Add SSI ACHS for glucose monitoring and control. Check A1c      Coronary artery disease involving native coronary artery -- reports MI in 2000 with stent placement.  See above        Stephanie Taylor NP  9/24/2021 6:34 AM

## 2021-09-24 NOTE — PERIOP NOTES
Family updated at 1:54 PM by Yu Francisco RN. Four digit code obtained from wife. Family updated at 3:02 PM by Yu Francisco RN. Four digit code obtained from wife. Family updated at 4:35 PM by Yu Francisco RN. Four digit code obtained from wife.

## 2021-09-24 NOTE — PROGRESS NOTES
Dual skin assessment completed on admission       09/24/21 0601   Skin Integumentary   Skin Integumentary (WDL) WDL    Pressure  Injury Documentation No Pressure Injury Noted-Pressure Ulcer Prevention Initiated   Skin Color Appropriate for ethnicity   Skin Condition/Temp Dry; Warm   Skin Integrity Intact   Turgor Non-tenting   Hair Growth Present   Nails WDL   Varicosities Absent     Sacrum and heels intact.

## 2021-09-24 NOTE — PROGRESS NOTES
Jonny Marte. MD Brandee Cadena. Antonio Queen MD           Consult Note    Kristina Monet         9/24/2021        1950    REFERRING PHYSICIAN:  Dr. Yohana Duran:  The patient is a 70 y.o. male who was admitted for NSTEMI (non-ST elevated myocardial infarction) ([address] [I21.4]  CAD (coronary artery disease) [I25.10]. Patient presented to the ER at [facility] with chest pain that started after working out at the gym on Thursday. He continued to have intermittent chest tightness. On arrival to the ER, EKG was abnormal with T wave inversion in inferolateral leads and troponin was elevated consistent with NSTEMI. He was admitted and started on IV Heparin. He underwent cardiac catheterization today that showed severe multivessel disease with subtotal occlusion of the left main. He denies any current chest pain. Risk factors include pre-diabetes, HTN, dyslipidemia    ** No history of stroke, TIA, prior cardiac surgery, prior vascular surgery, anesthetic complication, lung disease, DVT or PE, GI bleeding       Past Medical History:   Diagnosis Date    CAD (coronary artery disease)     GERD (gastroesophageal reflux disease)     HTN (hypertension)     Hypercholesteremia     Prediabetes     no meds       Past Surgical History:   Procedure Laterality Date    HX KNEE ARTHROSCOPY      to L    HX PTCA  2000    stents x 2    HX SHOULDER ARTHROSCOPY      to L       No family history on file.     Social History     Socioeconomic History    Marital status:      Spouse name: Not on file    Number of children: Not on file    Years of education: Not on file    Highest education level: Not on file   Occupational History    Not on file   Tobacco Use    Smoking status: Never Smoker   Substance and Sexual Activity    Alcohol use: No    Drug use: No    Sexual activity: Not on file   Other Topics Concern    Not on file   Social History Narrative    Not on file     Social Determinants of Health     Financial Resource Strain:     Difficulty of Paying Living Expenses:    Food Insecurity:     Worried About [address] in the Last Year:     [address] in the Last Year:    Transportation Needs:     Lack of Transportation (Medical):  Lack of Transportation (Non-Medical):    Physical Activity:     Days of Exercise per Week:     Minutes of Exercise per Session:    Stress:     Feeling of Stress :    Social Connections:     Frequency of Communication with Friends and Family:     Frequency of Social Gatherings with Friends and Family:     Attends [employer] Services:     Active Member of Clubs or Organizations:     Attends Club or Organization Meetings:     Marital Status:    Intimate Partner Violence:     Fear of Current or Ex-Partner:     Emotionally Abused:     Physically Abused:     Sexually Abused:        No Known Allergies    Current Facility-Administered Medications on File Prior to Encounter   Medication Dose Route Frequency Provider Last Rate Last Admin    [COMPLETED] aspirin chewable tablet 162 mg  162 mg Oral NOW Maya Gupta MD   162 mg at 09/24/21 0241    [COMPLETED] nitroglycerin (NITROBID) 2 % ointment 1 Inch  1 Inch Topical NOW Maya Gupta MD   1 Inch at 09/24/21 0313    [COMPLETED] heparin (porcine) injection 5,500 Units  60 Units/kg (Adjusted) IntraVENous ONCE Maya Gupta MD   5,500 Units at 09/24/21 5521     Current Outpatient Medications on File Prior to Encounter   Medication Sig Dispense Refill    empagliflozin (JARDIANCE) 10 mg tablet Take 10 mg by mouth daily.  glimepiride (AMARYL) 4 mg tablet Take 4 mg by mouth two (2) times a day.  metoprolol succinate (TOPROL-XL) 50 mg XL tablet Take 50 mg by mouth nightly.  metFORMIN (GLUCOPHAGE) 850 mg tablet Take 850 mg by mouth two (2) times daily (with meals).  lisinopril (PRINIVIL, ZESTRIL) 20 mg tablet Take 20 mg by mouth nightly.       aspirin delayed-release 81 mg tablet Take 81 mg by mouth nightly.  rosuvastatin (CRESTOR) 20 mg tablet Take 20 mg by mouth nightly. REVIEW OF SYSTEMS:  Review of Systems   Constitutional: Negative for chills, fever, malaise/fatigue, weight gain and weight loss. HENT: Negative for ear pain, hearing loss, nosebleeds, sore throat and tinnitus. Eyes: Negative for blurred vision, vision loss in left eye and vision loss in right eye. Cardiovascular: Positive for chest pain. Negative for dyspnea on exertion, leg swelling, near-syncope, orthopnea, palpitations, paroxysmal nocturnal dyspnea and syncope. Respiratory: Negative for cough, hemoptysis, shortness of breath, sputum production and wheezing. Endocrine: Negative for cold intolerance, heat intolerance and polydipsia. Hematologic/Lymphatic: Does not bruise/bleed easily. Skin: Negative for color change and rash. Musculoskeletal: Negative for back pain, joint pain, joint swelling and myalgias. Gastrointestinal: Negative for abdominal pain, constipation, diarrhea, dysphagia, heartburn, hematemesis, melena, nausea and vomiting. Genitourinary: Negative for dysuria, frequency, hematuria and urgency. Neurological: Negative for difficulty with concentration, dizziness, headaches, light-headedness, numbness, paresthesias, seizures, vertigo and weakness. Psychiatric/Behavioral: Negative for altered mental status and depression.        Physical Exam  Vitals:    09/24/21 0559 09/24/21 0759 09/24/21 1027   BP: 122/68 116/65 122/68   Pulse: 92 84    Resp: 18 18    Temp: 98.6 °F (37 °C) 96.8 °F (36 °C)    SpO2: 97% 95%    Weight: 269 lb 12.8 oz (122.4 kg)  249 lb (112.9 kg)   Height: 6' (1.829 m)  6' (1.829 m)       Physical Exam:  General: Well Developed, Well Nourished, No Acute Distress  HEENT: Normocephalic, pupils equal and round, no scleral icterus  Neck: supple, no JVD  Chest wall: No deformity  Heart: S1S2 with RRR without murmurs or gallops  Lungs: Clear throughout auscultation bilaterally  Vascular: Pulses are full and equal. There is no venous stasis disease. Abd: soft, nontender, nondistended, with good bowel sounds, no pulsatile masses  Ext: warm, no edema, calves supple/nontender, pulses 2+ bilaterally  Skin: warm and dry, no rashes, cyanosis, jaundice, ecchymoses or evidence of skin breakdown  Psychiatric: Normal mood and affect  Neurologic: Alert and oriented X 3, no focal deficit noted    Labs:   Recent Labs     09/24/21 0937 09/24/21 0223 09/24/21 0222   NA  --  138  --    K  --  3.7  --    MG  --  1.9  --    BUN  --  18  --    CREA  --  1.01  --    GLU  --  257*  --    WBC  --  5.4  --    HGB  --  12.2*  --    HCT  --  36.1*  --    PLT  --  199  --    INR  --   --  0.9   CHOL 92  --   --    HDL 33*  --   --    CHHD 2.8  --   --    LDLC 35.6  --   --    VLDL 23.4*  --   --        Lab Results   Component Value Date/Time    Cholesterol, total 92 09/24/2021 09:37 AM    HDL Cholesterol 33 (L) 09/24/2021 09:37 AM    LDL, calculated 35.6 09/24/2021 09:37 AM    VLDL, calculated 23.4 (H) 09/24/2021 09:37 AM    Triglyceride 117 09/24/2021 09:37 AM    CHOL/HDL Ratio 2.8 09/24/2021 09:37 AM       Assessment:     Principal Problem:    NSTEMI (non-ST elevated myocardial infarction) ([facility] [address] (9/24/2021)  Active Problems:    HTN (hypertension) (9/24/2021)    DM2 (diabetes mellitus, type 2) ([facility] (9/24/2021)    Coronary artery disease involving native coronary artery (9/24/2021)    Plan:     Given anatomy, pt is going to the OR for emergent CABG surgery. He understands and agrees to proceed.          Gilbert Boyce MD

## 2021-09-24 NOTE — PROGRESS NOTES
TRANSFER - IN REPORT:    Verbal report received from EDWIN Mark on Kristina Monet being received from OR for routine progression of care      Report consisted of patients Situation, Background, Assessment and Recommendations(SBAR). Information from the following report(s) SBAR, Kardex, OR Summary, Procedure Summary, Intake/Output, Recent Results and Cardiac Rhythm NSR was reviewed with the receiving nurse. Opportunity for questions and clarification was provided. Assessment completed upon patients arrival to unit and care assumed.

## 2021-09-24 NOTE — ANESTHESIA PROCEDURE NOTES
Arterial Line Placement    Start time: 9/24/2021 4:40 PM  End time: 9/24/2021 4:45 PM  Performed by: Yimi Del Valle MD  Authorized by: Yimi Del Valle MD     Pre-Procedure  Indications:  Arterial pressure monitoring and blood sampling  Preanesthetic Checklist: patient identified, risks and benefits discussed, anesthesia consent, site marked, patient being monitored, timeout performed and patient being monitored    Timeout Time: 16:40 EDT        Procedure:   Prep:  Chlorhexidine  Seldinger Technique?: Yes    Orientation:  Left  Location:  Radial artery  Catheter size:  20 G  Number of attempts:  2    Assessment:   Post-procedure:  Line secured and sterile dressing applied  Patient Tolerance:  Patient tolerated the procedure well with no immediate complications  Comment:   1st attempt crna.  2nd attempt md with ultrasound

## 2021-09-24 NOTE — PERIOP NOTES
Debrief completed yes    Correct procedure yes    Count completed and verified yes    Specimen collected and verified NA    Wound classification clean    Other NA

## 2021-09-24 NOTE — PROGRESS NOTES
Patient out from operating room and placed on the ventilator on documented settings. Patient is orally intubated with a # 8.0 ET Tube secured at the 24 cm lang at the lip. Breath sounds are clear. Trachea is midline. Negative for subcutaneous air, chest excursion is symmetric. Negative for pitting edema. Patient is also Negative for cyanosis. Patient has a Left radial arterial line. Patient has 2 Chest tubes. All alarms are set and audible. Resuscitation bag is at the head of the bed. Ventilator Settings  Mode FIO2 Rate Tidal Volume Pressure PEEP I:E Ratio   VC+  60 %   20 450 ml     8 cm H20  1:2      Peak airway pressure: 23 cm H2O   Minute ventilation: 9.24 l/min     ABG: No results for input(s): PH, PCO2, PO2, HCO3 in the last 72 hours.       Manas Brice, RT

## 2021-09-24 NOTE — Clinical Note
TRANSFER - IN REPORT:     Verbal report received from: tele rn. Report consisted of patient's Situation, Background, Assessment and   Recommendations(SBAR). Opportunity for questions and clarification was provided. Assessment completed upon patient's arrival to unit and care assumed. Patient transported with a Registered Nurse.

## 2021-09-24 NOTE — PROGRESS NOTES
María Fermin. MD Klaus Casiano. Thor Ovalle MD           MANAGEMENT PLAN    Dwayne Whitfield         9/24/2021        1950    REFERRING PHYSICIAN:  Dr. Miroslava Yung:  The patient is a 70 y.o. male who was admitted for NSTEMI (non-ST elevated myocardial infarction) ([facility] [address] [I21.4]. Patient presented to the ER at [facility] with chest pain that started after working out at the gym on Thursday. He continued to have intermittent chest tightness. On arrival to the ER, EKG was abnormal with T wave inversion in inferolateral leads and troponin was elevated consistent with NSTEMI. He was admitted and started on IV Heparin. He underwent cardiac catheterization today that showed severe multivessel disease with subtotal occlusion of the left main. He denies any current chest pain. Risk factors include pre-diabetes, HTN, dyslipidemia    ** No history of stroke, TIA, prior cardiac surgery, prior vascular surgery, anesthetic complication, lung disease, DVT or PE, GI bleeding       Past Medical History:   Diagnosis Date    CAD (coronary artery disease)     GERD (gastroesophageal reflux disease)     HTN (hypertension)     Hypercholesteremia     Prediabetes     no meds       Past Surgical History:   Procedure Laterality Date    HX KNEE ARTHROSCOPY      to L    HX PTCA  2000    stents x 2    HX SHOULDER ARTHROSCOPY      to L       No family history on file.     Social History     Socioeconomic History    Marital status:      Spouse name: Not on file    Number of children: Not on file    Years of education: Not on file    Highest education level: Not on file   Occupational History    Not on file   Tobacco Use    Smoking status: Never Smoker   Substance and Sexual Activity    Alcohol use: No    Drug use: No    Sexual activity: Not on file   Other Topics Concern    Not on file   Social History Narrative    Not on file     Social Determinants of Health     Financial Resource Strain:     Difficulty of Paying Living Expenses:    Food Insecurity:     Worried About Running Out of Food in the Last Year:     [address] in the Last Year:    Transportation Needs:     Lack of Transportation (Medical):  Lack of Transportation (Non-Medical):    Physical Activity:     Days of Exercise per Week:     Minutes of Exercise per Session:    Stress:     Feeling of Stress :    Social Connections:     Frequency of Communication with Friends and Family:     Frequency of Social Gatherings with Friends and Family:     Attends Yaz[employer] Services:     Active Member of Clubs or Organizations:     Attends Club or Organization Meetings:     Marital Status:    Intimate Partner Violence:     Fear of Current or Ex-Partner:     Emotionally Abused:     Physically Abused:     Sexually Abused:        No Known Allergies    Current Facility-Administered Medications on File Prior to Encounter   Medication Dose Route Frequency Provider Last Rate Last Admin    [COMPLETED] aspirin chewable tablet 162 mg  162 mg Oral NOW Nikkie Meza MD   162 mg at 09/24/21 0241    [COMPLETED] nitroglycerin (NITROBID) 2 % ointment 1 Inch  1 Inch Topical NOW Nikkie Meza MD   1 Inch at 09/24/21 0313    [COMPLETED] heparin (porcine) injection 5,500 Units  60 Units/kg (Adjusted) IntraVENous ONCE Nikkie Meza MD   5,500 Units at 09/24/21 0313    [DISCONTINUED] sodium chloride (NS) flush 5-10 mL  5-10 mL IntraVENous Q8H Nikkie Meza MD        [DISCONTINUED] sodium chloride (NS) flush 5-10 mL  5-10 mL IntraVENous PRN Nikkie Meza MD        [DISCONTINUED] heparin 25,000 units in dextrose 500 mL infusion  12-25 Units/kg/hr (Adjusted) IntraVENous TITRATE Nikkie Meza MD   Continued On External Transport at 09/24/21 2630     Current Outpatient Medications on File Prior to Encounter   Medication Sig Dispense Refill    empagliflozin (JARDIANCE) 10 mg tablet Take 10 mg by mouth daily.       glimepiride (AMARYL) 4 mg tablet Take 4 mg by mouth two (2) times a day.  metoprolol succinate (TOPROL-XL) 50 mg XL tablet Take 50 mg by mouth nightly.  metFORMIN (GLUCOPHAGE) 850 mg tablet Take 850 mg by mouth two (2) times daily (with meals).  lisinopril (PRINIVIL, ZESTRIL) 20 mg tablet Take 20 mg by mouth nightly.  aspirin delayed-release 81 mg tablet Take 81 mg by mouth nightly.  rosuvastatin (CRESTOR) 20 mg tablet Take 20 mg by mouth nightly.  [DISCONTINUED] lisinopriL (PRINIVIL, ZESTRIL) 40 mg tablet Take 40 mg by mouth daily.  [DISCONTINUED] rosuvastatin (CRESTOR) 20 mg tablet Take 20 mg by mouth.  [DISCONTINUED] METOPROLOL SUCCINATE (TOPROL XL PO) Take  by mouth daily. REVIEW OF SYSTEMS:  Review of Systems   Constitutional: Negative for chills, fever, malaise/fatigue, weight gain and weight loss. HENT: Negative for ear pain, hearing loss, nosebleeds, sore throat and tinnitus. Eyes: Negative for blurred vision, vision loss in left eye and vision loss in right eye. Cardiovascular: Positive for chest pain. Negative for dyspnea on exertion, leg swelling, near-syncope, orthopnea, palpitations, paroxysmal nocturnal dyspnea and syncope. Respiratory: Negative for cough, hemoptysis, shortness of breath, sputum production and wheezing. Endocrine: Negative for cold intolerance, heat intolerance and polydipsia. Hematologic/Lymphatic: Does not bruise/bleed easily. Skin: Negative for color change and rash. Musculoskeletal: Negative for back pain, joint pain, joint swelling and myalgias. Gastrointestinal: Negative for abdominal pain, constipation, diarrhea, dysphagia, heartburn, hematemesis, melena, nausea and vomiting. Genitourinary: Negative for dysuria, frequency, hematuria and urgency. Neurological: Negative for difficulty with concentration, dizziness, headaches, light-headedness, numbness, paresthesias, seizures, vertigo and weakness. Psychiatric/Behavioral: Negative for altered mental status and depression. Physical Exam  Vitals:    09/24/21 0559 09/24/21 0759 09/24/21 1027   BP: 122/68 116/65 122/68   Pulse: 92 84    Resp: 18 18    Temp: 98.6 °F (37 °C) 96.8 °F (36 °C)    SpO2: 97% 95%    Weight: 269 lb 12.8 oz (122.4 kg)  249 lb (112.9 kg)   Height: 6' (1.829 m)  6' (1.829 m)       Physical Exam:  General: Well Developed, Well Nourished, No Acute Distress  HEENT: Normocephalic, pupils equal and round, no scleral icterus  Neck: supple, no JVD  Chest wall: No deformity  Heart: S1S2 with RRR without murmurs or gallops  Lungs: Clear throughout auscultation bilaterally  Vascular: Pulses are full and equal. There is no venous stasis disease.   Abd: soft, nontender, nondistended, with good bowel sounds, no pulsatile masses  Ext: warm, no edema, calves supple/nontender, pulses 2+ bilaterally  Skin: warm and dry, no rashes, cyanosis, jaundice, ecchymoses or evidence of skin breakdown  Psychiatric: Normal mood and affect  Neurologic: Alert and oriented X 3, no focal deficit noted    Labs:   Recent Labs     09/24/21  0937 09/24/21 0223 09/24/21 0222   NA  --  138  --    K  --  3.7  --    MG  --  1.9  --    BUN  --  18  --    CREA  --  1.01  --    GLU  --  257*  --    WBC  --  5.4  --    HGB  --  12.2*  --    HCT  --  36.1*  --    PLT  --  199  --    INR  --   --  0.9   CHOL 92  --   --    HDL 33*  --   --    CHHD 2.8  --   --    LDLC 35.6  --   --    VLDL 23.4*  --   --        Lab Results   Component Value Date/Time    Cholesterol, total 92 09/24/2021 09:37 AM    HDL Cholesterol 33 (L) 09/24/2021 09:37 AM    LDL, calculated 35.6 09/24/2021 09:37 AM    VLDL, calculated 23.4 (H) 09/24/2021 09:37 AM    Triglyceride 117 09/24/2021 09:37 AM    CHOL/HDL Ratio 2.8 09/24/2021 09:37 AM       Assessment:     Principal Problem:    [facility]non[facility] (9/24/2021)  Active Problems:    HTN (hypertension) (9/24/2021)    DM2 (diabetes mellitus, type 2) ([facility] [address] (9/24/2021)    Coronary artery disease involving native coronary artery (9/24/2021)    Plan:     Given anatomy, pt is going to the OR for emergent CABG surgery. He understands and agrees to proceed.          Colletta Borders, PA-C

## 2021-09-24 NOTE — ROUTINE PROCESS
Bedside and verbal shift report received from [facility] to me and Inez Peterson.

## 2021-09-24 NOTE — PERIOP NOTES
TRANSFER - OUT REPORT:    Verbal report given to Venu Ott RN on Daren Reed  being transferred to CVICU for routine progression of care       Report consisted of patients Situation, Background, Assessment and   Recommendations(SBAR). Information from the following report(s) OR Summary was reviewed with the receiving nurse. Lines:   Double Lumen 09/24/21 Right Internal jugular (Active)       Nader Nailer Dual 09/24/21 Right Neck (Active)       Peripheral IV 09/24/21 Left Antecubital (Active)   Site Assessment Clean, dry, & intact 09/24/21 0738   Phlebitis Assessment 0 09/24/21 0738   Infiltration Assessment 0 09/24/21 0738   Dressing Status Clean, dry, & intact 09/24/21 0738   Dressing Type Transparent 09/24/21 0738   Hub Color/Line Status Pink; Infusing 09/24/21 0738   Alcohol Cap Used No 09/24/21 0600       Peripheral IV 09/24/21 Right Hand (Active)   Site Assessment Clean, dry, & intact 09/24/21 0738   Phlebitis Assessment 0 09/24/21 0738   Infiltration Assessment 0 09/24/21 0738   Dressing Status Clean, dry, & intact 09/24/21 0738   Dressing Type Transparent 09/24/21 0738   Hub Color/Line Status Patent; Flushed 09/24/21 0738   Alcohol Cap Used No 09/24/21 0600       Arterial Line 09/24/21 Left Radial artery (Active)        Opportunity for questions and clarification was provided.       Patient transported with:   Monitor  O2 @ 15 liters  Registered Nurse   CRNA  MDA

## 2021-09-24 NOTE — ANESTHESIA PREPROCEDURE EVALUATION
& CONT F/U DR DIAZ. Relevant Problems   CARDIOVASCULAR   (+) Coronary artery disease involving native coronary artery   (+) HTN (hypertension)   (+) NSTEMI (non-ST elevated myocardial infarction) (HCC)      ENDOCRINE   (+) DM2 (diabetes mellitus, type 2) ([facility])       Anesthetic History   No history of anesthetic complications            Review of Systems / Medical History  Patient summary reviewed, nursing notes reviewed and pertinent labs reviewed    Pulmonary  Within defined limits                 Neuro/Psych   Within defined limits           Cardiovascular    Hypertension          CAD and cardiac stents (2000)    Exercise tolerance: >4 METS  Comments: Severe multivessel coronary artery disease with subtotal occlusion of the left main coronary artery  Moderate LV dysfunction with segmental wall motion abnormalities consistent with ischemic cardiomyopathy.     Ef 35-40%   GI/Hepatic/Renal     GERD: well controlled           Endo/Other    Diabetes: type 2    Obesity     Other Findings            Physical Exam    Airway  Mallampati: II  TM Distance: 4 - 6 cm  Neck ROM: normal range of motion   Mouth opening: Normal     Cardiovascular  Regular rate and rhythm,  S1 and S2 normal,  no murmur, click, rub, or gallop             Dental    Dentition: Upper partial plate and Lower partial plate     Pulmonary  Breath sounds clear to auscultation               Abdominal         Other Findings            Anesthetic Plan    ASA: 4, emergent  Anesthesia type: general    Monitoring Plan: Arterial line, BIS, CVP and TRACI    Post procedure ventilation   Induction: Intravenous  Anesthetic plan and risks discussed with: Patient

## 2021-09-24 NOTE — BRIEF OP NOTE
Brief Postoperative Note    Patient: Lisa Barbosa  YOB: 1950  MRN: [id number]    Date of Procedure: 9/24/2021     Pre-Op Diagnosis: CAD    Post-Op Diagnosis: Same as preoperative diagnosis.       Procedure(s):  CORONARY ARTERY BYPASS GRAFT (Quoc Baar), LIMA to the LAD, SVG to the OM  VEIN HARVEST ENDOSCOPIC, GREATER SAPHENOUS VEIN  ESOPHAGEAL TRANS ECHOCARDIOGRAM    Surgeon(s):  MD Douglas Youssef MD    Surgical Assistant: None    Anesthesia: General     Estimated Blood Loss (mL): Minimal    Complications: None    Specimens: * No specimens in log *     Implants: * No implants in log *    Drains:   Orogastric Tube 09/24/21 (Active)       Findings:      Electronically Signed by Jennine Dubin, MD on 9/24/2021 at 4:35 PM

## 2021-09-24 NOTE — ANESTHESIA PROCEDURE NOTES
TRACI  Date/Time: 9/24/2021 1:00 PM  Ordering Provider: Star Adair MD    Procedure Details: probe placement, image aquisition & interpretation    Site marked, 13:00 EDT  Risks and benefits discussed with the patient and plans are to proceed    Procedure Note    Performed by: Tyler Mckeon MD  Authorized by: Tyler Mckeon MD       Indications: assessment of ascending aorta and assessment of surgical repair  Modalities: 2D, CF  Probe Type: biplane  Insertion: atraumatic  Patient Status: intubated and sedated    Echocardiographic and Doppler Measurements   Aorta  Size  Diam(cm)  Dissection PlaqueThick(mm)  Plaque Mobile    Ascending normal  No      Arch normal  No      Descending normal  No            Valves  Annulus  Stenosis  Area/Grad  Regurg  Leaflet   Morph  Leaflet   Motion    Aortic normal none  0 normal     Mitral normal none  1+ normal     Tricuspid normal none  1+ normal           Atria  Size  SEC (smoke)  Thrombus  Tumor  Device    Rt Atrium normal        Lt Atrium normal         Interatrial Septum Morphology: normal    Interventricular Septum Morphology: normal    Ventricle  Cavity Size  Cavity Dimension Hypertrophy  Thrombus  Gloal FXN  EF    RV normal  No  normal     LV normal    mildly impaired        Regional Function  (1 = normal, 2 = mildly hypokinetic, 3 = severely hypokinetic, 4 = akinetic, 5 = dyskinetic) LAV - [place]   ME LAV = 0  ME LAV = 90  ME LAV = 130                                     Pericardium: normal    Post Intervention Follow-up Study  Ventricular Global Function: improved  Ventricular Regional Function: improved     Valve  Function  Regurgitation  Area    Aortic no change      Mitral no change      Tricuspid no change      Prosthetic        Complications: None  Comments: EF appears mildly reduced. Inferior/lateral walls appear to contract well. Septal mildly hypokinetic.

## 2021-09-24 NOTE — PROGRESS NOTES
Update given to spouse Toby Jama over the phone after security code obtained. All questions answered.

## 2021-09-24 NOTE — ANESTHESIA PROCEDURE NOTES
Central Line Placement    Start time: 9/24/2021 12:30 PM  End time: 9/24/2021 12:40 PM  Performed by: Will Loo MD  Authorized by: Will Loo MD     Indications: vascular access, central pressure monitoring and need for vasopressors  Preanesthetic Checklist: patient identified, risks and benefits discussed, anesthesia consent, patient being monitored and timeout performed    Timeout Time: 12:30 EDT       Pre-procedure: All elements of maximal sterile barrier technique followed? Yes    2% Chlorhexidine for cutaneous antisepsis, Hand hygiene performed prior to catheter insertion and Ultrasound guidance    Sterile Ultrasound Technique followed?: Yes        Ultrasound Image Stored? Image stored    Procedure:   Prep:  Chlorhexidine    Orientation:  Right  Patient position:  Trendelenburg  Catheter type:  Double lumen  Catheter size:  9 Fr  Catheter length:  10 cm  Number of attempts:  1  Successful placement: Yes      Assessment:   Post-procedure:  Catheter secured and sterile dressing applied  Assessment:  Blood return through all ports, free fluid flow and guidewire removal verified  Insertion:  Uncomplicated  Patient tolerance:  Patient tolerated the procedure well with no immediate complications  Potential access site(s) were examined with ultrasound and acceptable patent access site selected as noted above. Needle path and vein access visualized in real time using ultrasound, an image of wire in vessel recorded for permanent record.

## 2021-09-24 NOTE — Clinical Note
Contrast Dose Calculator:   Patient's age: 79.   Patient's sex: Male. Patient weight (kg) = 122.4. Creatinine level (mg/dL) = 1.01. Creatinine clearance (mL/min): 118. Contrast concentration (mg/mL) = 370. MACD = 300 mL. Max Contrast dose per Creatinine Cl calculator = 265.5 mL.

## 2021-09-24 NOTE — PROCEDURES
Brief Cardiac Procedure Note    Patient: Jayson Garcia MRN: [id number]  SSN: [id number]    YOB: 1950  Age: 70 y.o. Sex: male      Date of Procedure: 9/24/2021     Pre-procedure Diagnosis: NSTEMI    Post-procedure Diagnosis: Multi-vessel CAD. Procedure: Left Heart Catheterization    Brief Description of Procedure: See above    Performed By: Lucas Poe MD     Assistants: None    Anesthesia: Moderate Sedation    Estimated Blood Loss: Less than 10 mL      Specimens: None    Implants: None    Findings: Multivessel CAD    Complications: None    Recommendations: CTS evaluation for CABG evaluation.   Graft Targets:   LAD, CIrc     Signed By: Lucas Poe MD     September 24, 2021

## 2021-09-24 NOTE — ROUTINE PROCESS
Verbal report given to [facility] by FRANCHESKA Wan on Deni Villar in the Cath Lab. Report consisted of patients Situation,Background,Assessment, and Recommendations (SBAR).

## 2021-09-24 NOTE — PROGRESS NOTES
TRANSFER - OUT REPORT:    Verbal report given to Lo Wheatley RN(name) on Leslie Stevenson  being transferred to CPRU(unit) for routine progression of care       Report consisted of patients Situation, Background, Assessment and   Recommendations(SBAR). Information from the following report(s) SBAR was reviewed with the receiving nurse.     [facility] with Dr Zurdo Peacock  CV surgery consult  R Radial  TR band at 12mL  Versed 2mg  Fentanyl 25mcg  Heparin 2000 units

## 2021-09-24 NOTE — ANESTHESIA POSTPROCEDURE EVALUATION
Procedure(s):  CORONARY ARTERY BYPASS GRAFT (Elwyn Fiddler), COFFEY  VEIN HARVEST ENDOSCOPIC, GREATER SAPHENOUS VEIN  ESOPHAGEAL TRANS ECHOCARDIOGRAM.    general    Anesthesia Post Evaluation      Multimodal analgesia: multimodal analgesia used between 6 hours prior to anesthesia start to PACU discharge  Patient location during evaluation: ICU  Patient participation: complete - patient cannot participate  Level of consciousness: obtunded/minimal responses  Pain management: adequate  Airway patency: patent  Anesthetic complications: no  Cardiovascular status: acceptable  Respiratory status: ETT and ventilator  Hydration status: acceptable  Post anesthesia nausea and vomiting:  none  Final Post Anesthesia Temperature Assessment:  Normothermia (36.0-37.5 degrees C)      INITIAL Post-op Vital signs:   Vitals Value Taken Time   /55 09/24/21 1729   Temp 37.1 °C (98.7 °F) 09/24/21 1917   Pulse 75 09/24/21 1917   Resp 16 09/24/21 1917   SpO2 100 % 09/24/21 1917   Vitals shown include unvalidated device data.

## 2021-09-25 ENCOUNTER — APPOINTMENT (OUTPATIENT)
Dept: GENERAL RADIOLOGY | Age: 71
DRG: 233 | End: 2021-09-25
Attending: THORACIC SURGERY (CARDIOTHORACIC VASCULAR SURGERY)
Payer: MEDICARE

## 2021-09-25 LAB
ANION GAP SERPL CALC-SCNC: 6 MMOL/L (ref 7–16)
ATRIAL RATE: 73 BPM
ATRIAL RATE: 87 BPM
BUN SERPL-MCNC: 16 MG/DL (ref 8–23)
CALCIUM SERPL-MCNC: 7.7 MG/DL (ref 8.3–10.4)
CALCULATED P AXIS, ECG09: 54 DEGREES
CALCULATED P AXIS, ECG09: 61 DEGREES
CALCULATED R AXIS, ECG10: 0 DEGREES
CALCULATED R AXIS, ECG10: 24 DEGREES
CALCULATED T AXIS, ECG11: -167 DEGREES
CALCULATED T AXIS, ECG11: 64 DEGREES
CHLORIDE SERPL-SCNC: 114 MMOL/L (ref 98–107)
CO2 SERPL-SCNC: 25 MMOL/L (ref 21–32)
CREAT SERPL-MCNC: 0.72 MG/DL (ref 0.8–1.5)
DIAGNOSIS, 93000: NORMAL
DIAGNOSIS, 93000: NORMAL
ERYTHROCYTE [DISTWIDTH] IN BLOOD BY AUTOMATED COUNT: 14.1 % (ref 11.9–14.6)
ERYTHROCYTE [DISTWIDTH] IN BLOOD BY AUTOMATED COUNT: 14.6 % (ref 11.9–14.6)
GLUCOSE BLD STRIP.AUTO-MCNC: 102 MG/DL (ref 65–100)
GLUCOSE BLD STRIP.AUTO-MCNC: 104 MG/DL (ref 65–100)
GLUCOSE BLD STRIP.AUTO-MCNC: 117 MG/DL (ref 65–100)
GLUCOSE BLD STRIP.AUTO-MCNC: 128 MG/DL (ref 65–100)
GLUCOSE BLD STRIP.AUTO-MCNC: 131 MG/DL (ref 65–100)
GLUCOSE BLD STRIP.AUTO-MCNC: 131 MG/DL (ref 65–100)
GLUCOSE BLD STRIP.AUTO-MCNC: 144 MG/DL (ref 65–100)
GLUCOSE BLD STRIP.AUTO-MCNC: 162 MG/DL (ref 65–100)
GLUCOSE BLD STRIP.AUTO-MCNC: 189 MG/DL (ref 65–100)
GLUCOSE BLD STRIP.AUTO-MCNC: 240 MG/DL (ref 65–100)
GLUCOSE SERPL-MCNC: 109 MG/DL (ref 65–100)
HCT VFR BLD AUTO: 31.9 % (ref 41.1–50.3)
HCT VFR BLD AUTO: 35 % (ref 41.1–50.3)
HGB BLD-MCNC: 10.4 G/DL (ref 13.6–17.2)
HGB BLD-MCNC: 11.4 G/DL (ref 13.6–17.2)
MAGNESIUM SERPL-MCNC: 2.2 MG/DL (ref 1.8–2.4)
MAGNESIUM SERPL-MCNC: 2.3 MG/DL (ref 1.8–2.4)
MCH RBC QN AUTO: 29.1 PG (ref 26.1–32.9)
MCH RBC QN AUTO: 29.5 PG (ref 26.1–32.9)
MCHC RBC AUTO-ENTMCNC: 32.6 G/DL (ref 31.4–35)
MCHC RBC AUTO-ENTMCNC: 32.6 G/DL (ref 31.4–35)
MCV RBC AUTO: 89.4 FL (ref 79.6–97.8)
MCV RBC AUTO: 90.7 FL (ref 79.6–97.8)
NRBC # BLD: 0 K/UL (ref 0–0.2)
NRBC # BLD: 0 K/UL (ref 0–0.2)
P-R INTERVAL, ECG05: 186 MS
P-R INTERVAL, ECG05: 264 MS
PLATELET # BLD AUTO: 128 K/UL (ref 150–450)
PLATELET # BLD AUTO: 131 K/UL (ref 150–450)
PMV BLD AUTO: 10.2 FL (ref 9.4–12.3)
PMV BLD AUTO: 10.3 FL (ref 9.4–12.3)
POTASSIUM SERPL-SCNC: 4.1 MMOL/L (ref 3.5–5.1)
POTASSIUM SERPL-SCNC: 4.1 MMOL/L (ref 3.5–5.1)
Q-T INTERVAL, ECG07: 352 MS
Q-T INTERVAL, ECG07: 404 MS
QRS DURATION, ECG06: 88 MS
QRS DURATION, ECG06: 90 MS
QTC CALCULATION (BEZET), ECG08: 423 MS
QTC CALCULATION (BEZET), ECG08: 445 MS
RBC # BLD AUTO: 3.57 M/UL (ref 4.23–5.6)
RBC # BLD AUTO: 3.86 M/UL (ref 4.23–5.6)
SERVICE CMNT-IMP: ABNORMAL
SODIUM SERPL-SCNC: 145 MMOL/L (ref 138–145)
VENTRICULAR RATE, ECG03: 73 BPM
VENTRICULAR RATE, ECG03: 87 BPM
WBC # BLD AUTO: 6.7 K/UL (ref 4.3–11.1)
WBC # BLD AUTO: 9.1 K/UL (ref 4.3–11.1)

## 2021-09-25 PROCEDURE — 71045 X-RAY EXAM CHEST 1 VIEW: CPT

## 2021-09-25 PROCEDURE — 83735 ASSAY OF MAGNESIUM: CPT

## 2021-09-25 PROCEDURE — 82962 GLUCOSE BLOOD TEST: CPT

## 2021-09-25 PROCEDURE — 65660000004 HC RM CVT STEPDOWN

## 2021-09-25 PROCEDURE — 74011250637 HC RX REV CODE- 250/637: Performed by: THORACIC SURGERY (CARDIOTHORACIC VASCULAR SURGERY)

## 2021-09-25 PROCEDURE — 74011000258 HC RX REV CODE- 258: Performed by: THORACIC SURGERY (CARDIOTHORACIC VASCULAR SURGERY)

## 2021-09-25 PROCEDURE — 36600 WITHDRAWAL OF ARTERIAL BLOOD: CPT

## 2021-09-25 PROCEDURE — 74011250636 HC RX REV CODE- 250/636: Performed by: THORACIC SURGERY (CARDIOTHORACIC VASCULAR SURGERY)

## 2021-09-25 PROCEDURE — 93005 ELECTROCARDIOGRAM TRACING: CPT | Performed by: THORACIC SURGERY (CARDIOTHORACIC VASCULAR SURGERY)

## 2021-09-25 PROCEDURE — 84132 ASSAY OF SERUM POTASSIUM: CPT

## 2021-09-25 PROCEDURE — 74011636637 HC RX REV CODE- 636/637: Performed by: THORACIC SURGERY (CARDIOTHORACIC VASCULAR SURGERY)

## 2021-09-25 PROCEDURE — 85027 COMPLETE CBC AUTOMATED: CPT

## 2021-09-25 PROCEDURE — 80048 BASIC METABOLIC PNL TOTAL CA: CPT

## 2021-09-25 PROCEDURE — 99232 SBSQ HOSP IP/OBS MODERATE 35: CPT | Performed by: INTERNAL MEDICINE

## 2021-09-25 PROCEDURE — 74011000250 HC RX REV CODE- 250: Performed by: THORACIC SURGERY (CARDIOTHORACIC VASCULAR SURGERY)

## 2021-09-25 PROCEDURE — 2709999900 HC NON-CHARGEABLE SUPPLY

## 2021-09-25 PROCEDURE — 36415 COLL VENOUS BLD VENIPUNCTURE: CPT

## 2021-09-25 RX ORDER — POTASSIUM CHLORIDE 750 MG/1
10 TABLET, EXTENDED RELEASE ORAL DAILY
Status: COMPLETED | OUTPATIENT
Start: 2021-09-26 | End: 2021-09-28

## 2021-09-25 RX ORDER — FAMOTIDINE 20 MG/1
20 TABLET, FILM COATED ORAL 2 TIMES DAILY
Status: DISCONTINUED | OUTPATIENT
Start: 2021-09-25 | End: 2021-09-29 | Stop reason: HOSPADM

## 2021-09-25 RX ORDER — ASPIRIN 81 MG/1
81 TABLET ORAL DAILY
Status: DISCONTINUED | OUTPATIENT
Start: 2021-09-26 | End: 2021-09-29 | Stop reason: HOSPADM

## 2021-09-25 RX ORDER — SODIUM CHLORIDE 0.9 % (FLUSH) 0.9 %
5-40 SYRINGE (ML) INJECTION AS NEEDED
Status: DISCONTINUED | OUTPATIENT
Start: 2021-09-25 | End: 2021-09-29 | Stop reason: HOSPADM

## 2021-09-25 RX ORDER — ZOLPIDEM TARTRATE 5 MG/1
5 TABLET ORAL
Status: DISCONTINUED | OUTPATIENT
Start: 2021-09-25 | End: 2021-09-29 | Stop reason: HOSPADM

## 2021-09-25 RX ORDER — LANOLIN ALCOHOL/MO/W.PET/CERES
400 CREAM (GRAM) TOPICAL
Status: DISCONTINUED | OUTPATIENT
Start: 2021-09-25 | End: 2021-09-29 | Stop reason: HOSPADM

## 2021-09-25 RX ORDER — ATORVASTATIN CALCIUM 20 MG/1
40 TABLET, FILM COATED ORAL EVERY EVENING
Status: DISCONTINUED | OUTPATIENT
Start: 2021-09-25 | End: 2021-09-25

## 2021-09-25 RX ORDER — AMIODARONE HYDROCHLORIDE 200 MG/1
200 TABLET ORAL 2 TIMES DAILY
Status: DISCONTINUED | OUTPATIENT
Start: 2021-09-25 | End: 2021-09-26

## 2021-09-25 RX ORDER — ATORVASTATIN CALCIUM 20 MG/1
40 TABLET, FILM COATED ORAL EVERY EVENING
Status: DISCONTINUED | OUTPATIENT
Start: 2021-09-25 | End: 2021-09-26

## 2021-09-25 RX ORDER — POTASSIUM CHLORIDE 20 MEQ/1
40 TABLET, EXTENDED RELEASE ORAL
Status: DISCONTINUED | OUTPATIENT
Start: 2021-09-25 | End: 2021-09-29 | Stop reason: HOSPADM

## 2021-09-25 RX ORDER — INSULIN LISPRO 100 [IU]/ML
INJECTION, SOLUTION INTRAVENOUS; SUBCUTANEOUS
Status: DISCONTINUED | OUTPATIENT
Start: 2021-09-25 | End: 2021-09-26 | Stop reason: SDUPTHER

## 2021-09-25 RX ORDER — ACETAMINOPHEN 325 MG/1
650 TABLET ORAL
Status: DISCONTINUED | OUTPATIENT
Start: 2021-09-25 | End: 2021-09-29 | Stop reason: HOSPADM

## 2021-09-25 RX ORDER — FUROSEMIDE 20 MG/1
40 TABLET ORAL DAILY
Status: COMPLETED | OUTPATIENT
Start: 2021-09-26 | End: 2021-09-28

## 2021-09-25 RX ORDER — POTASSIUM CHLORIDE 20 MEQ/1
20 TABLET, EXTENDED RELEASE ORAL
Status: DISCONTINUED | OUTPATIENT
Start: 2021-09-25 | End: 2021-09-29 | Stop reason: HOSPADM

## 2021-09-25 RX ORDER — OXYCODONE AND ACETAMINOPHEN 5; 325 MG/1; MG/1
1 TABLET ORAL
Status: DISCONTINUED | OUTPATIENT
Start: 2021-09-25 | End: 2021-09-29

## 2021-09-25 RX ORDER — SODIUM CHLORIDE 0.9 % (FLUSH) 0.9 %
5-40 SYRINGE (ML) INJECTION EVERY 8 HOURS
Status: DISCONTINUED | OUTPATIENT
Start: 2021-09-25 | End: 2021-09-29 | Stop reason: HOSPADM

## 2021-09-25 RX ORDER — MAG HYDROX/ALUMINUM HYD/SIMETH 200-200-20
30 SUSPENSION, ORAL (FINAL DOSE FORM) ORAL
Status: DISCONTINUED | OUTPATIENT
Start: 2021-09-25 | End: 2021-09-29 | Stop reason: HOSPADM

## 2021-09-25 RX ADMIN — OXYCODONE HYDROCHLORIDE AND ACETAMINOPHEN 1 TABLET: 5; 325 TABLET ORAL at 06:55

## 2021-09-25 RX ADMIN — METOPROLOL TARTRATE 25 MG: 25 TABLET, FILM COATED ORAL at 20:46

## 2021-09-25 RX ADMIN — FAMOTIDINE 20 MG: 20 TABLET, FILM COATED ORAL at 20:47

## 2021-09-25 RX ADMIN — Medication 40 ML: at 14:57

## 2021-09-25 RX ADMIN — Medication 1 AMPULE: at 09:28

## 2021-09-25 RX ADMIN — Medication 1 AMPULE: at 20:46

## 2021-09-25 RX ADMIN — AMIODARONE HYDROCHLORIDE 200 MG: 200 TABLET ORAL at 20:47

## 2021-09-25 RX ADMIN — SODIUM CHLORIDE 4.5 UNITS/HR: 9 INJECTION, SOLUTION INTRAVENOUS at 05:49

## 2021-09-25 RX ADMIN — Medication 10 ML: at 20:50

## 2021-09-25 RX ADMIN — Medication 10 ML: at 05:09

## 2021-09-25 RX ADMIN — ACETAMINOPHEN 650 MG: 325 TABLET ORAL at 23:14

## 2021-09-25 RX ADMIN — FAMOTIDINE 20 MG: 10 INJECTION INTRAVENOUS at 09:28

## 2021-09-25 RX ADMIN — ASPIRIN 81 MG: 81 TABLET, CHEWABLE ORAL at 09:28

## 2021-09-25 RX ADMIN — INSULIN LISPRO 4 UNITS: 100 INJECTION, SOLUTION INTRAVENOUS; SUBCUTANEOUS at 20:48

## 2021-09-25 RX ADMIN — KETOROLAC TROMETHAMINE 15 MG: 15 INJECTION, SOLUTION INTRAMUSCULAR; INTRAVENOUS at 03:17

## 2021-09-25 RX ADMIN — ATORVASTATIN CALCIUM 40 MG: 20 TABLET, FILM COATED ORAL at 20:47

## 2021-09-25 RX ADMIN — KETOROLAC TROMETHAMINE 15 MG: 15 INJECTION, SOLUTION INTRAMUSCULAR; INTRAVENOUS at 09:29

## 2021-09-25 RX ADMIN — OXYCODONE HYDROCHLORIDE AND ACETAMINOPHEN 1 TABLET: 5; 325 TABLET ORAL at 12:51

## 2021-09-25 RX ADMIN — CEFAZOLIN 2 G: 10 INJECTION, POWDER, FOR SOLUTION INTRAVENOUS at 06:11

## 2021-09-25 RX ADMIN — OXYCODONE HYDROCHLORIDE AND ACETAMINOPHEN 1 TABLET: 5; 325 TABLET ORAL at 19:44

## 2021-09-25 RX ADMIN — INSULIN HUMAN 5 UNITS: 100 INJECTION, SOLUTION PARENTERAL at 16:26

## 2021-09-25 RX ADMIN — KETOROLAC TROMETHAMINE 15 MG: 15 INJECTION, SOLUTION INTRAMUSCULAR; INTRAVENOUS at 16:20

## 2021-09-25 RX ADMIN — AMIODARONE HYDROCHLORIDE 200 MG: 200 TABLET ORAL at 09:28

## 2021-09-25 NOTE — ROUTINE PROCESS
Verbal bedside report given to gordon Duong RN. Patient's situation, background, assessment and recommendations provided. Opportunity for questions provided. Oncricardo RN assumed care of patient.

## 2021-09-25 NOTE — PROGRESS NOTES
Kristina Monet  Admission Date: 9/24/2021         Daily Progress Note: 9/25/2021    The patient's chart is reviewed and the patient is discussed with the staff. Background: 69 y/o male Patient is seen at the request of Dr. Elle Mcknight for respiratory management status post cardiac surgery. Patient had CABG x2.        He had presented 9/24 with chest pain and was dx with nstemi. LHC today revelaed multivessel disease. Subjective:   Pt.  Tolerated extubation  , still on some eugenie, on nc      Current Facility-Administered Medications   Medication Dose Route Frequency    rosuvastatin (CRESTOR) tablet 40 mg  40 mg Oral QHS    metFORMIN (GLUCOPHAGE) tablet 850 mg  850 mg Oral BID WITH MEALS    0.9% sodium chloride infusion  25 mL/hr IntraVENous CONTINUOUS    dextrose 5% - 0.45% NaCl with KCl 20 mEq/L infusion  25 mL/hr IntraVENous CONTINUOUS    sodium chloride (NS) flush 5-40 mL  5-40 mL IntraVENous Q8H    sodium chloride (NS) flush 5-40 mL  5-40 mL IntraVENous PRN    oxyCODONE-acetaminophen (PERCOCET) 5-325 mg per tablet 1 Tablet  1 Tablet Oral Q4H PRN    morphine injection 3-5 mg  3-5 mg IntraVENous Q1H PRN    naloxone (NARCAN) injection 0.4 mg  0.4 mg IntraVENous PRN    sodium bicarbonate (8.4%) injection 50 mEq  50 mEq IntraVENous PRN    nitroglycerin (Tridil) 200 mcg/ml infusion  0-20 mcg/min IntraVENous TITRATE    lidocaine (PF) (XYLOCAINE) 100 mg/5 mL (2 %) injection syringe  mg   mg IntraVENous ONCE PRN    amiodarone (CORDARONE) tablet 200 mg  200 mg Oral Q12H    metoprolol tartrate (LOPRESSOR) tablet 25 mg  25 mg Oral Q12H    ondansetron (ZOFRAN) injection 4 mg  4 mg IntraVENous Q4H PRN    insulin regular (NOVOLIN R, HUMULIN R) 100 Units in 0.9% sodium chloride 100 mL infusion  1 Units/hr IntraVENous TITRATE    dextrose (D50W) injection syrg 12.5 g  25 mL IntraVENous PRN    aspirin chewable tablet 81 mg  81 mg Oral DAILY    magnesium sulfate 1 g/100 ml IVPB (premix or compounded)  1 g IntraVENous PRN    potassium chloride 10 mEq in 50 ml IVPB  10 mEq IntraVENous PRN    midazolam (VERSED) injection 1 mg  1 mg IntraVENous Q1H PRN    chlorhexidine (PERIDEX) 0.12 % mouthwash 10 mL  10 mL Oral BID    tuberculin injection 5 Units  5 Units IntraDERMal ONCE    famotidine (PF) (PEPCID) 20 mg in 0.9% sodium chloride 10 mL injection  20 mg IntraVENous Q12H    ketorolac (TORADOL) injection 15 mg  15 mg IntraVENous Q6H PRN    alcohol 62% (NOZIN) nasal  1 Ampule  1 Ampule Topical Q12H    PHENYLephrine (PEDRO LUIS-SYNEPHRINE) 30 mg in 0.9% sodium chloride 250 mL infusion   mcg/min IntraVENous TITRATE     Review of Systems    Constitutional: negative for fever, chills, sweats  Cardiovascular: negative for chest pain, palpitations, syncope, edema  Gastrointestinal:  negative for dysphagia, reflux, vomiting, diarrhea, abdominal pain, or melena  Neurologic:  negative for focal weakness, numbness, headache  Objective:     Vitals:    09/25/21 0530 09/25/21 0545 09/25/21 0600 09/25/21 0615   BP:       Pulse: 85 85 85 84   Resp: 23 19 20 20   Temp: 99.8 °F (37.7 °C) 99.8 °F (37.7 °C) 100 °F (37.8 °C) 100 °F (37.8 °C)   SpO2: 97% 96% 97% 96%   Weight:       Height:           Intake/Output Summary (Last 24 hours) at 9/25/2021 9969  Last data filed at 9/25/2021 2433  Gross per 24 hour   Intake 4138.24 ml   Output 2180 ml   Net 1958.24 ml     Physical Exam:   Constitution:  the patient is well developed and in no acute distress  HEENT:  Sclera clear, pupils equal, oral mucosa moist  Respiratory: clear  Cardiovascular:  RRR without M,G,R  Gastrointestinal: soft and non-tender; with positive bowel sounds. Musculoskeletal: warm without cyanosis. There is no lower extremity edema.   Skin:  no jaundice or rashes, sternal wounds   Neurologic: no gross neuro deficits     Psychiatric:  alert and oriented x 3    CXR: pending    LAB:  Recent Labs     09/25/21  0307 09/24/21 2119 09/24/21 1735 09/24/21 0223 09/24/21 0223 09/24/21 0222   WBC 6.7  --  9.6  --  5.4  --    HGB 10.4* 10.6* 10.5*   < > 12.2*  --    HCT 31.9* 32.1* 31.9*   < > 36.1*  --    *  --  117*  --  199  --    INR  --   --  1.3  --   --  0.9    < > = values in this interval not displayed. Recent Labs     09/25/21 0307 09/24/21 2119 09/24/21 1735 09/24/21 0223 09/24/21 0223     --  147*  --  138   K 4.1 4.3 3.9   < > 3.7   *  --  114*  --  103   CO2 25  --  25  --  27   *  --  161*  --  257*   BUN 16  --  13  --  18   CREA 0.72*  --  0.76*  --  1.01   MG 2.2 2.5* 2.7*   < > 1.9   CA 7.7*  --  8.0*  --  8.9   ALB  --   --   --   --  3.7   AST  --   --   --   --  29   ALT  --   --   --   --  30   AP  --   --   --   --  106    < > = values in this interval not displayed. Recent Labs     09/24/21  0937 09/24/21 0405 09/24/21 0223   TROPHS 1,821.5* 1,460.6* 1,259.7*   BNPNT  --   --  526*     Recent Labs     09/24/21 2054 09/24/21 1737 09/24/21  1635   PHI 7.37 7.31* 7.38   PCO2I 41.1 48.7* 38.9   PO2I 85 85 90   HCO3I 23.8 24.6 22.8     No results for input(s): SDES, CULT in the last 72 hours.   Assessment and Plan:  (Medical Decision Making)   Principal Problem:    NSTEMI (non-ST elevated myocardial infarction) ([facility] [address] (9/24/2021)    On admit    Active Problems:    HTN (hypertension) (9/24/2021)   hypotensive on levophed      DM2 (diabetes mellitus, type 2) ([address] (9/24/2021)    monitor      Coronary artery disease involving native coronary artery (9/24/2021)          CAD (coronary artery disease) (9/24/2021)         Encounter for weaning from ventilator ([facility] [address] (9/24/2021)    Tolerating extubation      Hypoxemia (9/24/2021)    On nc      S/P CABG x 2 (9/24/2021)    Still on some eugenie      Acute pulmonary edema ([address] (9/24/2021)    Can not see this am cxr      More than 50% of the time documented was spent in face-to-face contact with the patient and in the care of the patient on the floor/unit where the patient is located.     [place] MD

## 2021-09-25 NOTE — PROGRESS NOTES
Ventilator check complete; patient has a #8. 0 ET tube secured at the 24 at the lip. Patient is not sedated. Patient is able to follow commands. Breath sounds are clear. Trachea is midline, Negative for subcutaneous air, and chest excursion is symmetric. Patient is also Negative for cyanosis and is Negative for pitting edema. All alarms are set and audible. Resuscitation bag is at the head of the bed.       Ventilator Settings  Mode FIO2 Rate Tidal Volume Pressure PEEP I:E Ratio   Pressure support  40 %     10 cm H2O  8 cm H20  1:2      Peak airway pressure: 18 cm H2O   Minute ventilation: 8.6 l/min         RT Shanti

## 2021-09-25 NOTE — PROGRESS NOTES
Respiratory Mechanics completed and are as follows:  Weaning Parameters  Spontaneous Breathing Trial Complete: Yes  Resp Rate Observed: 16  Ve: 8.9  VT: 602  RSBI: 27  NIF: -40  PEF: 882  Newman Agitation Sedation Scale (RASS): Alert and calm  Patient extubated to a 3L NC. Patient is able to communicate and is negative for stridor. Breath sounds are clear and diminished. No complications with extubation.      Ranjit Garcia, RT

## 2021-09-25 NOTE — PROGRESS NOTES
Progress Note    Patient: Lissa Guillermo MRN: [id number]  SSN: [id number]    YOB: 1950  Age: 70 y.o. Sex: male      Admit Date: 9/24/2021    LOS: 1 day   POD 1 s/p:  CORONARY ARTERY BYPASS GRAFT (Eloy Lasso), LIMA to the LAD, SVG to the OM  Subjective:     No issues overnight, but remains pressor dependent for now. Objective:     Vitals:    09/25/21 0319 09/25/21 0330 09/25/21 0345 09/25/21 0405   BP: (!) 92/51      Pulse: 85 85 85    Resp: 20 19 22    Temp: 100.4 °F (38 °C) 100.2 °F (37.9 °C) 100.2 °F (37.9 °C)    SpO2: 97% 98% 98%    Weight:    124.9 kg (275 lb 5.7 oz)   Height:            Hemodynamics:  Browning out; hemodynamics stable. Intake and Output:  Current Shift: 09/24 1901 - 09/25 0700  In: 1000 [I.V.:1000]  Out: 725 [Urine:470]  Chest tube drainage in 24 hrs: 330  Last three shifts: 09/23 0701 - 09/24 1900  In: 2350 [I.V.:1500]  Out: 6264 [Urine:1250]  Last 3 Recorded Weights in this Encounter    09/24/21 0559 09/24/21 1027 09/25/21 0405   Weight: 122.4 kg (269 lb 12.8 oz) 112.9 kg (249 lb) 124.9 kg (275 lb 5.7 oz)       Intake/Output Summary (Last 24 hours) at 9/25/2021 0425  Last data filed at 9/25/2021 0403  Gross per 24 hour   Intake 3350 ml   Output 2030 ml   Net 1320 ml     Date 09/24/21 0700 - 09/25/21 0659 09/25/21 0700 - 09/26/21 0659   Shift 1049-9036 2065-5787 24 Hour Total 1041-4493 9365-9772 24 Hour Total   INTAKE   I.V.(mL/kg/hr) 1500(1.1) 1000 2500        I.V.  1000 1000        Volume (lactated Ringers infusion) 250  250        Volume (lactated Ringers infusion) 250  250        Volume (0.9% sodium chloride infusion) 1000  1000      Blood 850  850        Autotransfused 850  850      Shift Total(mL/kg) 2350(20.8) 1000(8) 3350(26.8)      OUTPUT   Urine(mL/kg/hr) 1075(0.8) 645 1720        Urine Voided 300  300        Urine Output 650  650        Urine Output (mL) (Urinary Catheter 09/24/21 2- way; [place] - Temperature) 125 645 770      Chest Tube 15 295 310 Output (ml) (Chest Tube #1 09/24/21 Angled;Pleural;Left) 15 295 310      Shift Total(mL/kg) 1090(9.7) 940(7.5) 8619(68.0)      NET 1260 60 1320      Weight (kg) 112.9 124.9 124.9 124.9 124.9 124.9         Physical Exam:   Neuro:  Awake and alert. Nonfocal.  Neck:  No JVD  Chest:  Incision clean; sternum intact  Cor:  rrr without murmur, rub, or gallop. Lungs:  Clear to auscultation  Ext:  Incisions clean. Min edema.     Lab/Data Review:  BMP:   Lab Results   Component Value Date/Time     09/25/2021 03:07 AM    K 4.1 09/25/2021 03:07 AM     (H) 09/25/2021 03:07 AM    CO2 25 09/25/2021 03:07 AM    AGAP 6 (L) 09/25/2021 03:07 AM     (H) 09/25/2021 03:07 AM    BUN 16 09/25/2021 03:07 AM    CREA 0.72 (L) 09/25/2021 03:07 AM    GFRAA >60 09/25/2021 03:07 AM    GFRNA >60 09/25/2021 03:07 AM     CMP:   Lab Results   Component Value Date/Time     09/25/2021 03:07 AM    K 4.1 09/25/2021 03:07 AM     (H) 09/25/2021 03:07 AM    CO2 25 09/25/2021 03:07 AM    AGAP 6 (L) 09/25/2021 03:07 AM     (H) 09/25/2021 03:07 AM    BUN 16 09/25/2021 03:07 AM    CREA 0.72 (L) 09/25/2021 03:07 AM    GFRAA >60 09/25/2021 03:07 AM    GFRNA >60 09/25/2021 03:07 AM    CA 7.7 (L) 09/25/2021 03:07 AM    MG 2.2 09/25/2021 03:07 AM     CBC:   Lab Results   Component Value Date/Time    WBC 9.6 09/24/2021 05:35 PM    HGB 10.6 (L) 09/24/2021 09:19 PM    HCT 32.1 (L) 09/24/2021 09:19 PM     (L) 09/24/2021 05:35 PM     All Cardiac Markers in the last 24 hours: No results found for: CPK, CK, CKMMB, CKMB, RCK3, CKMBT, CKNDX, CKND1, UCHE, TROPT, TROIQ, CHERELLE, TROPT, TNIPOC, BNP, BNPP  Recent Glucose Results:   Lab Results   Component Value Date/Time     (H) 09/25/2021 03:07 AM     (H) 09/24/2021 05:35 PM     ABG:   Lab Results   Component Value Date/Time    PHI 7.37 09/24/2021 08:54 PM    PCO2I 41.1 09/24/2021 08:54 PM    PO2I 85 09/24/2021 08:54 PM    HCO3I 23.8 09/24/2021 08:54 PM    FIO2I 30 09/24/2021 08:54 PM     COAGS:   Lab Results   Component Value Date/Time    APTT 29.4 09/24/2021 05:35 PM    PTP 16.1 (H) 09/24/2021 05:35 PM    INR 1.3 09/24/2021 05:35 PM     Liver Panel: No results found for: ALB, CBIL, TBIL, TP, GLOB, AGRAT, ASTPOC, ALTPOC, ALT, AP         Assessment:     The patient is presently POD #1 from an emergent CABG. Overall, patient is doing well. Major issues: Pressor dependence    Principal Problem:    NSTEMI (non-ST elevated myocardial infarction) ([address] (9/24/2021)    Active Problems:    HTN (hypertension) (9/24/2021)      DM2 (diabetes mellitus, type 2) ([address] (9/24/2021)      Coronary artery disease involving native coronary artery (9/24/2021)      CAD (coronary artery disease) (9/24/2021)      Encounter for weaning from ventilator ([facility] [address] (9/24/2021)      Hypoxemia (9/24/2021)      S/P CABG x 2 (9/24/2021)      Acute pulmonary edema ([address] (9/24/2021)        Plan:       Cardiac: cardiac function good; wean pressors as tolerated. Respiratory: On oxygen at 2LPM.    Renal: Stable. GI: Advance as tolerated. Neuro:  Stable. Wires: In.    Tubes: Out per protocol. Disposition:  Stay in CVICU today due to pressor dependence.   Likely transfer tomorrow AM.        Signed By: Marylee Starks, MD     September 25, 2021

## 2021-09-25 NOTE — ROUTINE PROCESS
TIMEOUT performed prior to extubation on Deni Bird with RT, primary RN, and charge RN present on 9/24/2021 at 2100. Per anesthesia team on admission, patient's intubation was Normal    ABG results as follows:    Lab Results   Component Value Date/Time    pH (POC) 7.37 09/24/2021 08:54 PM    pCO2 (POC) 41.1 09/24/2021 08:54 PM    pO2 (POC) 85 09/24/2021 08:54 PM    HCO3 (POC) 23.8 09/24/2021 08:54 PM    sO2 (POC) 96.1 09/24/2021 08:54 PM    Base deficit (POC) 1.4 09/24/2021 08:54 PM         The patient is hemodynamically stable and can demonstrate the following on a consistent basis:  follow commands , elevate head off the pillow, nods appropriately to questions. Dr. Lauri Banuelos notified of weaning parameters and order to extubate obtained. Patient extubated by Pawel Flores RT to NC at 3L without complication.

## 2021-09-26 ENCOUNTER — APPOINTMENT (OUTPATIENT)
Dept: GENERAL RADIOLOGY | Age: 71
DRG: 233 | End: 2021-09-26
Attending: THORACIC SURGERY (CARDIOTHORACIC VASCULAR SURGERY)
Payer: MEDICARE

## 2021-09-26 LAB
ERYTHROCYTE [DISTWIDTH] IN BLOOD BY AUTOMATED COUNT: 14.4 % (ref 11.9–14.6)
GLUCOSE BLD STRIP.AUTO-MCNC: 190 MG/DL (ref 65–100)
GLUCOSE BLD STRIP.AUTO-MCNC: 195 MG/DL (ref 65–100)
GLUCOSE BLD STRIP.AUTO-MCNC: 211 MG/DL (ref 65–100)
GLUCOSE BLD STRIP.AUTO-MCNC: 251 MG/DL (ref 65–100)
HCT VFR BLD AUTO: 32.3 % (ref 41.1–50.3)
HGB BLD-MCNC: 10.5 G/DL (ref 13.6–17.2)
MAGNESIUM SERPL-MCNC: 2.3 MG/DL (ref 1.8–2.4)
MCH RBC QN AUTO: 28.9 PG (ref 26.1–32.9)
MCHC RBC AUTO-ENTMCNC: 32.5 G/DL (ref 31.4–35)
MCV RBC AUTO: 89 FL (ref 79.6–97.8)
NRBC # BLD: 0 K/UL (ref 0–0.2)
PLATELET # BLD AUTO: 122 K/UL (ref 150–450)
PMV BLD AUTO: 10 FL (ref 9.4–12.3)
POTASSIUM SERPL-SCNC: 4.3 MMOL/L (ref 3.5–5.1)
RBC # BLD AUTO: 3.63 M/UL (ref 4.23–5.6)
SERVICE CMNT-IMP: ABNORMAL
WBC # BLD AUTO: 8.7 K/UL (ref 4.3–11.1)

## 2021-09-26 PROCEDURE — 74011250636 HC RX REV CODE- 250/636: Performed by: THORACIC SURGERY (CARDIOTHORACIC VASCULAR SURGERY)

## 2021-09-26 PROCEDURE — 97530 THERAPEUTIC ACTIVITIES: CPT

## 2021-09-26 PROCEDURE — 74011000258 HC RX REV CODE- 258: Performed by: THORACIC SURGERY (CARDIOTHORACIC VASCULAR SURGERY)

## 2021-09-26 PROCEDURE — 77030012890

## 2021-09-26 PROCEDURE — 36415 COLL VENOUS BLD VENIPUNCTURE: CPT

## 2021-09-26 PROCEDURE — 74011250637 HC RX REV CODE- 250/637: Performed by: THORACIC SURGERY (CARDIOTHORACIC VASCULAR SURGERY)

## 2021-09-26 PROCEDURE — 99232 SBSQ HOSP IP/OBS MODERATE 35: CPT | Performed by: INTERNAL MEDICINE

## 2021-09-26 PROCEDURE — 71045 X-RAY EXAM CHEST 1 VIEW: CPT

## 2021-09-26 PROCEDURE — 2709999900 HC NON-CHARGEABLE SUPPLY

## 2021-09-26 PROCEDURE — 85027 COMPLETE CBC AUTOMATED: CPT

## 2021-09-26 PROCEDURE — 84132 ASSAY OF SERUM POTASSIUM: CPT

## 2021-09-26 PROCEDURE — 97162 PT EVAL MOD COMPLEX 30 MIN: CPT

## 2021-09-26 PROCEDURE — 74011636637 HC RX REV CODE- 636/637: Performed by: THORACIC SURGERY (CARDIOTHORACIC VASCULAR SURGERY)

## 2021-09-26 PROCEDURE — 83735 ASSAY OF MAGNESIUM: CPT

## 2021-09-26 PROCEDURE — 65660000004 HC RM CVT STEPDOWN

## 2021-09-26 PROCEDURE — 82962 GLUCOSE BLOOD TEST: CPT

## 2021-09-26 RX ORDER — INSULIN LISPRO 100 [IU]/ML
INJECTION, SOLUTION INTRAVENOUS; SUBCUTANEOUS
Status: DISCONTINUED | OUTPATIENT
Start: 2021-09-26 | End: 2021-09-29 | Stop reason: HOSPADM

## 2021-09-26 RX ORDER — AMIODARONE HYDROCHLORIDE 200 MG/1
200 TABLET ORAL EVERY 12 HOURS
Status: DISCONTINUED | OUTPATIENT
Start: 2021-09-26 | End: 2021-09-26 | Stop reason: SDUPTHER

## 2021-09-26 RX ORDER — OXYCODONE AND ACETAMINOPHEN 10; 325 MG/1; MG/1
1 TABLET ORAL
Status: DISCONTINUED | OUTPATIENT
Start: 2021-09-26 | End: 2021-09-29

## 2021-09-26 RX ORDER — ATORVASTATIN CALCIUM 40 MG/1
40 TABLET, FILM COATED ORAL
Status: DISCONTINUED | OUTPATIENT
Start: 2021-09-26 | End: 2021-09-29 | Stop reason: HOSPADM

## 2021-09-26 RX ORDER — AMOXICILLIN 250 MG
2 CAPSULE ORAL EVERY 12 HOURS
Status: DISCONTINUED | OUTPATIENT
Start: 2021-09-26 | End: 2021-09-29

## 2021-09-26 RX ADMIN — METOPROLOL TARTRATE 25 MG: 25 TABLET, FILM COATED ORAL at 21:06

## 2021-09-26 RX ADMIN — INSULIN LISPRO 6 UNITS: 100 INJECTION, SOLUTION INTRAVENOUS; SUBCUTANEOUS at 12:14

## 2021-09-26 RX ADMIN — FAMOTIDINE 20 MG: 20 TABLET, FILM COATED ORAL at 08:47

## 2021-09-26 RX ADMIN — Medication 10 ML: at 15:14

## 2021-09-26 RX ADMIN — POTASSIUM CHLORIDE 10 MEQ: 10 TABLET, EXTENDED RELEASE ORAL at 08:48

## 2021-09-26 RX ADMIN — Medication 10 ML: at 21:06

## 2021-09-26 RX ADMIN — METFORMIN HYDROCHLORIDE 850 MG: 850 TABLET ORAL at 16:36

## 2021-09-26 RX ADMIN — INSULIN LISPRO 4 UNITS: 100 INJECTION, SOLUTION INTRAVENOUS; SUBCUTANEOUS at 08:46

## 2021-09-26 RX ADMIN — SENNOSIDES AND DOCUSATE SODIUM 2 TABLET: 8.6; 5 TABLET ORAL at 21:05

## 2021-09-26 RX ADMIN — FUROSEMIDE 40 MG: 20 TABLET ORAL at 08:47

## 2021-09-26 RX ADMIN — OXYCODONE HYDROCHLORIDE AND ACETAMINOPHEN 1 TABLET: 10; 325 TABLET ORAL at 15:15

## 2021-09-26 RX ADMIN — METFORMIN HYDROCHLORIDE 850 MG: 850 TABLET ORAL at 08:47

## 2021-09-26 RX ADMIN — ACETAMINOPHEN 650 MG: 325 TABLET ORAL at 05:42

## 2021-09-26 RX ADMIN — INSULIN LISPRO 2 UNITS: 100 INJECTION, SOLUTION INTRAVENOUS; SUBCUTANEOUS at 21:06

## 2021-09-26 RX ADMIN — Medication 1 AMPULE: at 21:05

## 2021-09-26 RX ADMIN — OXYCODONE HYDROCHLORIDE AND ACETAMINOPHEN 1 TABLET: 10; 325 TABLET ORAL at 08:48

## 2021-09-26 RX ADMIN — AMIODARONE HYDROCHLORIDE 1 MG/MIN: 50 INJECTION, SOLUTION INTRAVENOUS at 21:15

## 2021-09-26 RX ADMIN — INSULIN LISPRO 2 UNITS: 100 INJECTION, SOLUTION INTRAVENOUS; SUBCUTANEOUS at 16:35

## 2021-09-26 RX ADMIN — ATORVASTATIN CALCIUM 40 MG: 40 TABLET, FILM COATED ORAL at 21:05

## 2021-09-26 RX ADMIN — OXYCODONE HYDROCHLORIDE AND ACETAMINOPHEN 1 TABLET: 5; 325 TABLET ORAL at 03:19

## 2021-09-26 RX ADMIN — METOPROLOL TARTRATE 25 MG: 25 TABLET, FILM COATED ORAL at 08:47

## 2021-09-26 RX ADMIN — AMIODARONE HYDROCHLORIDE 200 MG: 200 TABLET ORAL at 08:48

## 2021-09-26 RX ADMIN — ASPIRIN 81 MG: 81 TABLET, COATED ORAL at 09:00

## 2021-09-26 RX ADMIN — Medication 1 AMPULE: at 08:48

## 2021-09-26 RX ADMIN — OXYCODONE HYDROCHLORIDE AND ACETAMINOPHEN 1 TABLET: 10; 325 TABLET ORAL at 19:46

## 2021-09-26 RX ADMIN — Medication 10 ML: at 05:54

## 2021-09-26 RX ADMIN — FAMOTIDINE 20 MG: 20 TABLET, FILM COATED ORAL at 16:36

## 2021-09-26 RX ADMIN — DEXTROSE 150 MG: 50 INJECTION, SOLUTION INTRAVENOUS at 21:03

## 2021-09-26 RX ADMIN — SENNOSIDES AND DOCUSATE SODIUM 2 TABLET: 8.6; 5 TABLET ORAL at 08:47

## 2021-09-26 NOTE — PROGRESS NOTES
ACUTE PHYSICAL THERAPY GOALS:  (Developed with and agreed upon by patient and/or caregiver.)  1. Mr. Diego Fernandez will perform supine to sit and sit to supine with bed flat and no rails independently in 7 days. 2.  Mr. Diego Fernandez will perform sit to stand and bed to chair independently in 7 days. 3.  Mr. Diego Fernandez will perform gait independently >1000 ft in 7 days. 4.  Mr. Diego Fernandez will go up and down 4 steps with rail independently in 7 days. PHYSICAL THERAPY ASSESSMENT: Initial Assessment, Daily Note and AM PT Treatment Day # 1      Ayanna Diez is a 70 y.o. male   PRIMARY DIAGNOSIS: NSTEMI (non-ST elevated myocardial infarction) ([facility] [address]  NSTEMI (non-ST elevated myocardial infarction) ([facility]) [I21.4]  CAD (coronary artery disease) [I25.10]  Procedure(s) (LRB):  CORONARY ARTERY BYPASS GRAFT (CABGX2), LIMA (N/A)  VEIN HARVEST ENDOSCOPIC, GREATER SAPHENOUS VEIN (Left)  ESOPHAGEAL TRANS ECHOCARDIOGRAM (N/A)  2 Days Post-Op  Reason for Referral:    ICD-10: Treatment Diagnosis: Generalized Muscle Weakness (M62.81)  Difficulty in walking, Not elsewhere classified (R26.2)  [place]PAT[place]: [place] / [place] / Product Type: Medicare /     ASSESSMENT:     REHAB RECOMMENDATIONS:   Recommendation to date pending progress:  [address] [place] [address]  Equipment:    None     PRIOR LEVEL OF FUNCTION:  (Prior to Hospitalization) INITIAL/CURRENT LEVEL OF FUNCTION:  (Most Recently Demonstrated)   Bed Mobility:   Independent  Sit to Stand:   Independent  Transfers:   Independent  Gait/Mobility:   Independent Bed Mobility:   Not tested  Sit to Stand:   Minimal Assistance  Transfers:   Minimal Assistance  Gait/Mobility:   Contact Guard Assistance     ASSESSMENT:  Mr. Diego Fernandez is a very fit appearing 70year old S/P CABG after having chest pain after a work out at [employer]. Today Mr. Diego Fernandez has an elevated RR and just appears uncomfortable when moving. Limited gait because of it.   He has a tendency to use his arms way too much and needed repeated education on sternal precautions. RN providing pain medicine. Discussed breathing along with relaxation breathing to try and get him more comfortable. Ended up using a walker for part of the walk this morning in order to better the SOB. .  Expect he will do fine and return home at discharge with wife support. Mr. Jose Luis Leigh is functioning below baseline and is therefore appropriate for skilled PT to maximize his rehab potential.     SUBJECTIVE:   Mr. Jose Luis Leigh states, \"I was working out almost every day. \"    SOCIAL HISTORY/LIVING ENVIRONMENT:   Home Environment: Patient room  One/Two Story Residence: One story  Living Alone: No  Support Systems: Spouse/Significant Other  OBJECTIVE:     PAIN: VITAL SIGNS: LINES/DRAINS:   Pre Treatment:   6  Post Treatment: 6  O2 sat 97%   O2 Device: None (Room air)     GROSS EVALUATION:   Within Functional Limits Abnormal/ Functional Abnormal/ Non-Functional (see comments) Not Tested Comments:   AROM [x] [] [] []    PROM [] [] [] []    Strength [x] [] [] []    Balance [] [] [] []    Posture [] [] [] []    Sensation [x] [] [] []    Coordination [] [] [] []    Tone [] [] [] []    Edema [] [] [] []    Activity Tolerance [] [x] [] [] SOB easily with activity right now    [] [] [] []      COGNITION/  PERCEPTION: Intact Impaired   (see comments) Comments:   Orientation [x] []    Vision [x] []    Hearing [x] []    Command Following [x] []    Safety Awareness [x] []     [] []      MOBILITY: I Mod I S SBA CGA Min Mod Max Total  NT x2 Comments:   Bed Mobility    Rolling [] [] [] [] [] [] [] [] [] [x] []    Supine to Sit [] [] [] [] [] [] [] [] [] [x] []    Scooting [] [] [] [] [] [] [] [] [] [x] []    Sit to Supine [] [] [] [] [] [] [] [] [] [x] []    Transfers    Sit to Stand [] [] [] [] [] [x] [] [] [] [] []    Bed to Chair [] [] [] [] [] [] [] [] [] [x] []    Stand to Sit [] [] [] [] [] [x] [] [] [] [] []    I=Independent, Mod I=Modified Independent, S=Supervision, SBA=Standby Assistance, CGA=Contact Guard Assistance,   Min=Minimal Assistance, Mod=Moderate Assistance, Max=Maximal Assistance, Total=Total Assistance, NT=Not Tested  GAIT: I Mod I S SBA CGA Min Mod Max Total  NT x2 Comments:   Level of Assistance [] [] [] [] [x] [] [] [] [] [] []    Distance 100 ft    DME None and and then RW    Gait Quality Wider base. Slightly unsteady    Weightbearing Status N/A     I=Independent, Mod I=Modified Independent, S=Supervision, SBA=Standby Assistance, CGA=Contact Guard Assistance,   Min=Minimal Assistance, Mod=Moderate Assistance, Max=Maximal Assistance, Total=Total Assistance, NT=Not [facility]       How much difficulty does the patient currently have. .. Unable A Lot A Little None   1. Turning over in bed (including adjusting bedclothes, sheets and blankets)? [] 1   [x] 2   [] 3   [] 4   2. Sitting down on and standing up from a chair with arms ( e.g., wheelchair, bedside commode, etc.)   [] 1   [] 2   [x] 3   [] 4   3. Moving from lying on back to sitting on the side of the bed? [] 1   [x] 2   [] 3   [] 4   How much help from another person does the patient currently need. .. Total A Lot A Little None   4. Moving to and from a bed to a chair (including a wheelchair)? [] 1   [] 2   [x] 3   [] 4   5. Need to walk in hospital room? [] 1   [] 2   [x] 3   [] 4   6. Climbing 3-5 steps with a railing? [] 1   [] 2   [x] 3   [] 4   © 2007, Trustees of [address] [place] [address] [address] [address], under license to [employer]. All rights reserved     Score:  Initial: 16 Most Recent: X (Date: -- )    Interpretation of Tool:  Represents activities that are increasingly more difficult (i.e. Bed mobility, Transfers, Gait).     PLAN:   FREQUENCY/DURATION: PT Plan of Care: BID for duration of hospital stay or until stated goals are met, whichever comes first.    PROBLEM LIST:   (Skilled intervention is medically necessary to address:)  1. Decreased Activity Tolerance  2. Decreased AROM/PROM  3. Decreased Balance  4. Decreased Gait Ability  5. Decreased Strength  6. Decreased Transfer Abilities  7. Increased Pain   INTERVENTIONS PLANNED:   (Benefits and precautions of physical therapy have been discussed with the patient.)  1. Therapeutic Activity  2. Therapeutic Exercise/HEP  3. Neuromuscular Re-education  4. Gait Training  5. Education     TREATMENT:     EVALUATION: Moderate Complexity : (Untimed Charge)    TREATMENT:   ($$ Therapeutic Activity: 8-22 mins    )  Therapeutic Activity (13 Minutes): Therapeutic activity included Transfer Training, Ambulation on level ground, Sitting balance  and Standing balance to improve functional Mobility.     TREATMENT GRID:  N/A    AFTER TREATMENT POSITION/PRECAUTIONS:  Chair and RN notified    INTERDISCIPLINARY COLLABORATION:  RN/PCT and PT/PTA    TOTAL TREATMENT DURATION:  PT Patient Time In/Time Out  Time In: 0830  Time Out: [address], PT

## 2021-09-26 NOTE — CONSULTS
Hospitalist Consult    NAME:  Lila Garcia   Age:  70 y.o.  :   1950   MRN:   [id number]  PCP: Vianey Honeycutt MD  Consulting MD:  Treatment Team: Attending Provider: María Elena Gimenez MD; Care Manager: Annette Salazar [facility] [place] Utilization Review: [facility]; Consulting Provider: María Elena Gimenez MD; Consulting Provider: Fabi Marquez DO; Primary Nurse: Brian Quinteros RN; Physical Therapist: Margarito Lima PT    No chief complaint on file. HPI:   Patient is a 70 y.o. male who presented on  for NSTEMI and CABG X2. Hx of CAD, GERD, HTN, HLD, and DM type II. A1C on  9.1. We are consulted on DM management. Admits to eating what he wants at home. Eating and drinking well. Glucose as high as 251 but has not been on his home management DM medications. Past Medical History:   Diagnosis Date    CAD (coronary artery disease)     GERD (gastroesophageal reflux disease)     HTN (hypertension)     Hypercholesteremia     Prediabetes     no meds        Past Surgical History:   Procedure Laterality Date    HX KNEE ARTHROSCOPY      to L    HX PTCA  2000    stents x 2    HX SHOULDER ARTHROSCOPY      to L        No family history on file. Social History     Social History Narrative    Not on file        Social History     Tobacco Use    Smoking status: Never Smoker   Substance Use Topics    Alcohol use: No        Social History     Substance and Sexual Activity   Drug Use No         No Known Allergies    Prior to Admission medications    Medication Sig Start Date End Date Taking? Authorizing Provider   empagliflozin (JARDIANCE) 10 mg tablet Take 10 mg by mouth daily. 21 Yes Other, MD Nidia   glimepiride (AMARYL) 4 mg tablet Take 4 mg by mouth two (2) times a day. 21 Yes Other, MD Nidia   metoprolol succinate (TOPROL-XL) 50 mg XL tablet Take 50 mg by mouth nightly.  21 Yes Other, MD Nidia   metFORMIN (GLUCOPHAGE) 850 mg tablet Take 850 mg by mouth two (2) times daily (with meals). Yes Maria Victoria, MD Nidia   lisinopril (PRINIVIL, ZESTRIL) 20 mg tablet Take 20 mg by mouth nightly. Yes Maria Victoria, MD Nidia   aspirin delayed-release 81 mg tablet Take 81 mg by mouth nightly. Yes Maria Victoria, MD Nidia   rosuvastatin (CRESTOR) 20 mg tablet Take 20 mg by mouth nightly. Yes Maria Victoria, MD Nidia           Review of Systems    Constitutional:  NAD  Eyes: PERRLA, accomodation symmetric, sclera not icteric, conjunctivae clear  Ears, nose, mouth, throat, and face: normocephalic, no thrush, lesions or exudate. Moist mucous membranes  Respiratory: no SOB, COOLEY or hemoptysis. No chronic cough  Cardiovascular: no CP, palpitations or SOB  Gastrointestinal: no abdominal pain, nausea, vomiting or diarrhea. No constipation.  No hematemesis, hematochezia or BRBPR  Genitourinary:no urgency, frequency or dysuria, no nocturia  Integument/breast: no skin rashes, lesions  Hematologic/lymphatic: negative for known bleeding disorders  Musculoskeletal:negative for joint pain, joint tenderness  Neurological: negative for dizziness lightheadedness, syncopal or presyncopal events, no seizure or h/o CVA  Behavioral/Psych: no depression, anxiety or suicidal ideation  Endocrine: negative for polydipsea, polyuria or intolerance to heat or cold  Allergic/Immunologic: negative for allergic responses      Objective:     Patient Vitals for the past 24 hrs:   Temp Pulse Resp BP SpO2   09/26/21 1107 97.5 °F (36.4 °C) 74 16 (!) 110/57 94 %   09/26/21 0714 97.1 °F (36.2 °C) 85 (!) 32 (!) 111/59 91 %   09/26/21 0620  93  129/76    09/26/21 0608  85 22 129/76    09/26/21 0606  87  126/68 93 %   09/26/21 0553  86 22 (!) 141/68 92 %   09/26/21 0551  86  (!) 141/68 93 %   09/26/21 0535  84  119/60 93 %   09/26/21 0534  84 20 119/60 94 %   09/26/21 0521  82  (!) 114/59 92 %   09/26/21 0519  86 22 (!) 114/59 93 %   09/26/21 0320 100 °F (37.8 °C) 86 26 117/65 93 %   09/25/21 2330 99.2 °F (37.3 °C) 90 24 123/63    09/25/21 1930 99.9 °F (37.7 °C) 97 22 111/69 93 %   09/25/21 1802  87 25 (!) 100/53 94 %   09/25/21 1732  96 30 (!) 111/58 91 %   09/25/21 1702  100 (!) 35 122/60 95 %   09/25/21 1632  96 12 126/61 93 %   09/25/21 1602 99.1 °F (37.3 °C) 97 24 (!) 115/56 93 %   09/25/21 1532  92 (!) 41 (!) 108/57 92 %   09/25/21 1502  90 (!) 32 (!) 109/58 93 %   09/25/21 1432  87 (!) 40 (!) 111/58 94 %   09/25/21 1402  86 25 (!) 104/56 93 %   09/25/21 1332  85 21 (!) 102/55 93 %        09/26 0701 - 09/26 1900  In: 930 [P.O.:930]  Out: 140   09/24 1901 - 09/26 0700  In: 3608.2 [P.O.:1680;  I.V.:1928.2]  Out: 2390 [Urine:1665]    Data Review:   Recent Results (from the past 24 hour(s))   GLUCOSE, POC    Collection Time: 09/25/21  3:03 PM   Result Value Ref Range    Glucose (POC) 144 (H) 65 - 100 mg/dL    Performed by MarionpekRNKrmarlene    GLUCOSE, POC    Collection Time: 09/25/21  4:25 PM   Result Value Ref Range    Glucose (POC) 162 (H) 65 - 100 mg/dL    Performed by JopekRNKrmarlene    MAGNESIUM    Collection Time: 09/25/21  8:10 PM   Result Value Ref Range    Magnesium 2.3 1.8 - 2.4 mg/dL   POTASSIUM    Collection Time: 09/25/21  8:10 PM   Result Value Ref Range    Potassium 4.1 3.5 - 5.1 mmol/L   CBC W/O DIFF    Collection Time: 09/25/21  8:10 PM   Result Value Ref Range    WBC 9.1 4.3 - 11.1 K/uL    RBC 3.86 (L) 4.23 - 5.6 M/uL    HGB 11.4 (L) 13.6 - 17.2 g/dL    HCT 35.0 (L) 41.1 - 50.3 %    MCV 90.7 79.6 - 97.8 FL    MCH 29.5 26.1 - 32.9 PG    MCHC 32.6 31.4 - 35.0 g/dL    RDW 14.6 11.9 - 14.6 %    PLATELET 580 (L) 146 - 450 K/uL    MPV 10.3 9.4 - 12.3 FL    ABSOLUTE NRBC 0.00 0.0 - 0.2 K/uL   GLUCOSE, POC    Collection Time: 09/25/21  8:18 PM   Result Value Ref Range    Glucose (POC) 240 (H) 65 - 100 mg/dL    Performed by Norman Lawson    MAGNESIUM    Collection Time: 09/26/21  3:56 AM   Result Value Ref Range    Magnesium 2.3 1.8 - 2.4 mg/dL   POTASSIUM    Collection Time: 09/26/21  3:56 AM   Result Value Ref Range    Potassium 4.3 3.5 - 5.1 mmol/L   CBC W/O DIFF    Collection Time: 09/26/21  3:56 AM   Result Value Ref Range    WBC 8.7 4.3 - 11.1 K/uL    RBC 3.63 (L) 4.23 - 5.6 M/uL    HGB 10.5 (L) 13.6 - 17.2 g/dL    HCT 32.3 (L) 41.1 - 50.3 %    MCV 89.0 79.6 - 97.8 FL    MCH 28.9 26.1 - 32.9 PG    MCHC 32.5 31.4 - 35.0 g/dL    RDW 14.4 11.9 - 14.6 %    PLATELET 254 (L) 813 - 450 K/uL    MPV 10.0 9.4 - 12.3 FL    ABSOLUTE NRBC 0.00 0.0 - 0.2 K/uL   GLUCOSE, POC    Collection Time: 09/26/21  5:53 AM   Result Value Ref Range    Glucose (POC) 211 (H) 65 - 100 mg/dL    Performed by Vaishali    GLUCOSE, POC    Collection Time: 09/26/21 11:08 AM   Result Value Ref Range    Glucose (POC) 251 (H) 65 - 100 mg/dL    Performed by An        Physical Exam:     General:  Alert, cooperative, no distress, appears stated age. Eyes:  Conjunctivae/corneas clear. Ears:  Normal TMs and external ear canals both ears. Nose: Nares normal. Septum midline. Mouth/Throat: Lips, mucosa, and tongue normal. Teeth and gums normal.   Neck:  no JVD. Back:   deferred   Lungs:   Clear to auscultation bilaterally. Heart:  Regular rate and rhythm, S1, S2 normal   Abdomen:   Soft, non-tender. Bowel sounds normal. No masses,  No organomegaly. Extremities: Extremities normal, atraumatic, no cyanosis or edema. Healing anterior incison   Pulses: 2+ and symmetric all extremities. Skin: Skin color, texture, turgor normal. No rashes or lesions   Lymph nodes: Cervical, supraclavicular, and axillary nodes normal.   Neurologic: CNII-XII intact. Normal strength, sensation and reflexes throughout.      Assessment and Plan     Principal Problem:    NSTEMI (non-ST elevated myocardial infarction) ([facility] (9/24/2021)    Active Problems:    HTN (hypertension) (9/24/2021)      DM[facility]diabetes mellitus[facility] type 2[facility] (9/24/2021)      Coronary artery disease involving native coronary artery (9/24/2021)      CAD (coronary artery disease) (9/24/2021)      Encounter for weaning from ventilator [facility]) (9/24/2021)      Hypoxemia (9/24/2021)      S/P CABG x 2 (9/24/2021)      Acute pulmonary edema ([facility] (9/24/2021)    NSTEMI s/p CABG - per primary    DM type II - Continue metformin, SS, will add back his Jardiance. Holding amaryl since I want to trend his glucose levels. I want to avoid hypoglycemia. Thank you for consult. Will follow tomorrow to make sure glucose levels improved.      Signed By: Elisa Monet,    September 26, 2021

## 2021-09-26 NOTE — PROGRESS NOTES
ACUTE PHYSICAL THERAPY GOALS:  (Developed with and agreed upon by patient and/or caregiver.)  1. Mr. Tomy Ortega will perform supine to sit and sit to supine with bed flat and no rails independently in 7 days. 2.  Mr. Tomy Ortega will perform sit to stand and bed to chair independently in 7 days. 3.  Mr. Tomy Ortega will perform gait independently >1000 ft in 7 days. 4.  Mr. Tomy Ortega will go up and down 4 steps with rail independently in 7 days. PHYSICAL THERAPY ASSESSMENT: Daily Note and PM PT Treatment Day # 1      Daren Reed is a 70 y.o. male   PRIMARY DIAGNOSIS: NSTEMI (non-ST elevated myocardial infarction) ([facility]  NSTEMI (non-ST elevated myocardial infarction) ([facility] [I21.4]  CAD (coronary artery disease) [I25.10]  Procedure(s) (LRB):  CORONARY ARTERY BYPASS GRAFT (CABGX2), LIMA (N/A)  VEIN HARVEST ENDOSCOPIC, GREATER SAPHENOUS VEIN (Left)  ESOPHAGEAL TRANS ECHOCARDIOGRAM (N/A)  2 Days Post-Op  Reason for Referral:    ICD-10: Treatment Diagnosis: Generalized Muscle Weakness (M62.81)  Difficulty in walking, Not elsewhere classified (R26.2)  [place] / [place] / Product Type: Medicare /     ASSESSMENT:     REHAB RECOMMENDATIONS:   Recommendation to date pending progress:  [address] [place] [address]  Equipment:    None     PRIOR LEVEL OF FUNCTION:  (Prior to Hospitalization) INITIAL/CURRENT LEVEL OF FUNCTION:  (Most Recently Demonstrated)   Bed Mobility:   Independent  Sit to Stand:   Independent  Transfers:   Independent  Gait/Mobility:   Independent Bed Mobility:   Contact Guard Assistance  Sit to Stand:   Contact Guard Assistance  Transfers:   Contact Guard Assistance  Gait/Mobility:   Contact Guard Assistance     ASSESSMENT:  Mr. Tomy Ortega is a very fit appearing 70year old S/P CABG after having chest pain after a work out at [employer]. Today Mr. Tomy Ortega has an elevated RR and just appears uncomfortable when moving.   Limited gait because of it.  He has a tendency to use his arms way too much and needed repeated education on sternal precautions. RN providing pain medicine. Discussed breathing along with relaxation breathing to try and get him more comfortable. Ended up using a walker for part of the walk this morning in order to better the SOB. .  Expect he will do fine and return home at discharge with wife support. Mr. Jose Luis Leigh is functioning below baseline and is therefore appropriate for skilled PT to maximize his rehab potential.    PM- better this afternoon. Didn't need walker. Did get a little short of breath towards the end but tolerated double the distance. Seemed more comfortable. Did better with not using arms this afternoon also.      SUBJECTIVE:   Mr. Jose Luis Leigh states, \"I think it was better this afternoon\"    SOCIAL HISTORY/LIVING ENVIRONMENT:   Home Environment: Patient room  One/Two Story Residence: One story  Living Alone: No  Support Systems: Spouse/Significant Other  OBJECTIVE:     PAIN: VITAL SIGNS: LINES/DRAINS:   Pre Treatment:   6  Post Treatment: 6  O2 sat 97%   O2 Device: None (Room air)     GROSS EVALUATION:   Within Functional Limits Abnormal/ Functional Abnormal/ Non-Functional (see comments) Not Tested Comments:   AROM [x] [] [] []    PROM [] [] [] []    Strength [x] [] [] []    Balance [] [] [] []    Posture [] [] [] []    Sensation [x] [] [] []    Coordination [] [] [] []    Tone [] [] [] []    Edema [] [] [] []    Activity Tolerance [] [x] [] [] SOB easily with activity right now    [] [] [] []      COGNITION/  PERCEPTION: Intact Impaired   (see comments) Comments:   Orientation [x] []    Vision [x] []    Hearing [x] []    Command Following [x] []    Safety Awareness [x] []     [] []      MOBILITY: I Mod I S SBA CGA Min Mod Max Total  NT x2 Comments:   Bed Mobility    Rolling [] [] [] [] [] [] [] [] [] [x] []    Supine to Sit [] [] [] [] [] [] [] [] [] [x] []    Scooting [] [] [] [] [] [] [] [] [] [x] []    Sit to Supine [] [] [] [] [x] [] [] [] [] [] [] Head of bed elevated   Transfers    Sit to Stand [] [] [] [] [x] [] [] [] [] [] []    Bed to Chair [] [] [] [] [] [] [] [] [] [x] []    Stand to Sit [] [] [] [] [x] [] [] [] [] [] []    I=Independent, Mod I=Modified Independent, S=Supervision, SBA=Standby Assistance, CGA=Contact Guard Assistance,   Min=Minimal Assistance, Mod=Moderate Assistance, Max=Maximal Assistance, Total=Total Assistance, NT=Not Tested  GAIT: I Mod I S SBA CGA Min Mod Max Total  NT x2 Comments:   Level of Assistance [] [] [] [] [x] [] [] [] [] [] []    Distance 150 ft    DME None    Gait Quality Wider base. Weightbearing Status N/A     I=Independent, Mod I=Modified Independent, S=Supervision, SBA=Standby Assistance, CGA=Contact Guard Assistance,   Min=Minimal Assistance, Mod=Moderate Assistance, Max=Maximal Assistance, Total=Total Assistance, NT=Not [facility]       How much difficulty does the patient currently have. .. Unable A Lot A Little None   1. Turning over in bed (including adjusting bedclothes, sheets and blankets)? [] 1   [x] 2   [] 3   [] 4   2. Sitting down on and standing up from a chair with arms ( e.g., wheelchair, bedside commode, etc.)   [] 1   [] 2   [x] 3   [] 4   3. Moving from lying on back to sitting on the side of the bed? [] 1   [x] 2   [] 3   [] 4   How much help from another person does the patient currently need. .. Total A Lot A Little None   4. Moving to and from a bed to a chair (including a wheelchair)? [] 1   [] 2   [x] 3   [] 4   5. Need to walk in hospital room? [] 1   [] 2   [x] 3   [] 4   6. Climbing 3-5 steps with a railing? [] 1   [] 2   [x] 3   [] 4   © 2007, Trustees of [address] [place] [id number], under license to [employer].  All rights reserved     Score:  Initial: 16 Most Recent: X (Date: -- )    Interpretation of Tool:  Represents activities that are increasingly more difficult (i.e. Bed mobility, Transfers, Gait). PLAN:   FREQUENCY/DURATION: PT Plan of Care: BID for duration of hospital stay or until stated goals are met, whichever comes first.    PROBLEM LIST:   (Skilled intervention is medically necessary to address:)  1. Decreased Activity Tolerance  2. Decreased AROM/PROM  3. Decreased Balance  4. Decreased Gait Ability  5. Decreased Strength  6. Decreased Transfer Abilities  7. Increased Pain   INTERVENTIONS PLANNED:   (Benefits and precautions of physical therapy have been discussed with the patient.)  1. Therapeutic Activity  2. Therapeutic Exercise/HEP  3. Neuromuscular Re-education  4. Gait Training  5. Education     TREATMENT:     EVALUATION: Moderate Complexity : (Untimed Charge)    TREATMENT:   ($$ Therapeutic Activity: 8-22 mins    )  Therapeutic Activity (15 Minutes): Therapeutic activity included Sit to Supine, Transfer Training, Ambulation on level ground, Sitting balance  and Standing balance to improve functional Mobility.     TREATMENT GRID:  N/A    AFTER TREATMENT POSITION/PRECAUTIONS:  Chair and RN notified    INTERDISCIPLINARY COLLABORATION:  RN/PCT and PT/PTA    TOTAL TREATMENT DURATION:  PT Patient Time In/Time Out  Time In: 1240  Time Out: 1050 frooly, PT

## 2021-09-26 NOTE — PROGRESS NOTES
Kristina Monet  Admission Date: 9/24/2021         Daily Progress Note: 9/26/2021    The patient's chart is reviewed and the patient is discussed with the staff. Background: 71 y/o male Patient is seen at the request of Dr. Elle Mcknight for respiratory management status post cardiac surgery. Patient had CABG x2. Transferred to [facility] 9/25.        Subjective:   Now on RA. Sitting up in recliner. No distress. Afebrile. Chest tubes pulled this AM and tolerated well.        Current Facility-Administered Medications   Medication Dose Route Frequency    amiodarone (CORDARONE) tablet 200 mg  200 mg Oral Q12H    senna-docusate (PERICOLACE) 8.6-50 mg per tablet 2 Tablet  2 Tablet Oral Q12H    oxyCODONE-acetaminophen (PERCOCET 10)  mg per tablet 1 Tablet  1 Tablet Oral Q4H PRN    sodium chloride (OCEAN) 0.65 % nasal squeeze bottle 2 Spray  2 Spray Both Nostrils Q2H PRN    sodium chloride (NS) flush 5-40 mL  5-40 mL IntraVENous Q8H    sodium chloride (NS) flush 5-40 mL  5-40 mL IntraVENous PRN    furosemide (LASIX) tablet 40 mg  40 mg Oral DAILY    alum-mag hydroxide-simeth (MYLANTA) oral suspension 30 mL  30 mL Oral Q4H PRN    famotidine (PEPCID) tablet 20 mg  20 mg Oral BID    acetaminophen (TYLENOL) tablet 650 mg  650 mg Oral Q4H PRN    zolpidem (AMBIEN) tablet 5 mg  5 mg Oral QHS PRN    aspirin delayed-release tablet 81 mg  81 mg Oral DAILY    magnesium oxide (MAG-OX) tablet 400 mg  400 mg Oral TID PRN    magnesium oxide (MAG-OX) tablet 400 mg  400 mg Oral QID PRN    potassium chloride (KLOR-CON) tablet 10 mEq  10 mEq Oral DAILY    potassium chloride (K-DUR, KLOR-CON) SR tablet 20 mEq  20 mEq Oral BID PRN    potassium chloride (K-DUR, KLOR-CON) SR tablet 40 mEq  40 mEq Oral BID PRN    oxyCODONE-acetaminophen (PERCOCET) 5-325 mg per tablet 1 Tablet  1 Tablet Oral Q4H PRN    atorvastatin (LIPITOR) tablet 40 mg  40 mg Oral QPM    insulin lispro (HUMALOG) injection   SubCUTAneous AC&HS    metFORMIN (GLUCOPHAGE) tablet 850 mg  850 mg Oral BID WITH MEALS    naloxone (NARCAN) injection 0.4 mg  0.4 mg IntraVENous PRN    metoprolol tartrate (LOPRESSOR) tablet 25 mg  25 mg Oral Q12H    ondansetron (ZOFRAN) injection 4 mg  4 mg IntraVENous Q4H PRN    dextrose (D50W) injection syrg 12.5 g  25 mL IntraVENous PRN    alcohol 62% (NOZIN) nasal  1 Ampule  1 Ampule Topical Q12H     Review of Systems    Constitutional: negative for fever, chills, sweats  Cardiovascular: negative for chest pain, palpitations, syncope, edema  Gastrointestinal:  negative for dysphagia, reflux, vomiting, diarrhea, abdominal pain, or melena  Neurologic:  negative for focal weakness, numbness, headache  Objective:     Vitals:    09/26/21 0534 09/26/21 0553 09/26/21 0608 09/26/21 0714   BP: 119/60 (!) 141/68 129/76 (!) 111/59   Pulse: 84 86 85 85   Resp: 20 22 22 (!) 32   Temp:    97.1 °F (36.2 °C)   SpO2: 94% 92%  91%   Weight:   277 lb 9.6 oz (125.9 kg)    Height:           Intake/Output Summary (Last 24 hours) at 9/26/2021 0758  Last data filed at 9/26/2021 0400  Gross per 24 hour   Intake 1820 ml   Output 1515 ml   Net 305 ml     Physical Exam:   Constitution:  the patient is well developed and in no acute distress  HEENT:  Sclera clear, pupils equal, oral mucosa moist  Respiratory: crackles posteriorly, on RA   Cardiovascular:  RRR without M,G,R  Gastrointestinal: soft and non-tender; with positive bowel sounds. Musculoskeletal: warm without cyanosis. There is no lower extremity edema.   Skin:  no jaundice or rashes, sternal wounds   Neurologic: no gross neuro deficits     Psychiatric:  alert and oriented x 3    CXR:   9/26/2021       LAB:  Recent Labs     09/26/21  0356 09/25/21 2010 09/25/21  0307 09/24/21  2119 09/24/21  1735 09/24/21  0223 09/24/21  0222   WBC 8.7 9.1 6.7   < > 9.6   < >  --    HGB 10.5* 11.4* 10.4*   < > 10.5*   < >  --    HCT 32.3* 35.0* 31.9*   < > 31.9*   < >  --    * 131* 128* < > 117*   < >  --    INR  --   --   --   --  1.3  --  0.9    < > = values in this interval not displayed. Recent Labs     09/26/21  0356 09/25/21 2010 09/25/21 0307 09/24/21 2119 09/24/21 1735 09/24/21 0223 09/24/21  0223   NA  --   --  145  --  147*  --  138   K 4.3 4.1 4.1   < > 3.9   < > 3.7   CL  --   --  114*  --  114*  --  103   CO2  --   --  25  --  25  --  27   GLU  --   --  109*  --  161*  --  257*   BUN  --   --  16  --  13  --  18   CREA  --   --  0.72*  --  0.76*  --  1.01   MG 2.3 2.3 2.2   < > 2.7*   < > 1.9   CA  --   --  7.7*  --  8.0*  --  8.9   ALB  --   --   --   --   --   --  3.7   AST  --   --   --   --   --   --  29   ALT  --   --   --   --   --   --  30   AP  --   --   --   --   --   --  106    < > = values in this interval not displayed. Recent Labs     09/24/21  0937 09/24/21  0405 09/24/21  0223   TROPHS 1,821.5* 1,460.6* 1,259.7*   BNPNT  --   --  526*     Recent Labs     09/24/21 2054 09/24/21 1737 09/24/21  1635   PHI 7.37 7.31* 7.38   PCO2I 41.1 48.7* 38.9   PO2I 85 85 90   HCO3I 23.8 24.6 22.8     No results for input(s): SDES, CULT in the last 72 hours.   Assessment and Plan:  (Medical Decision Making)   Principal Problem:    NSTEMI (non-ST elevated myocardial infarction) ([facility] (9/24/2021)    On admit, s/p CABG    Active Problems:    HTN (hypertension) (9/24/2021)  --per primary      DM2 (diabetes mellitus, type 2) ([facility] (9/24/2021)    Monitor, on metformin      Coronary artery disease involving native coronary artery (9/24/2021)    --s/p CABG    CAD (coronary artery disease) (9/24/2021)   --S/P CABG      Hypoxemia (9/24/2021)    --resolved, now on RA       S/P CABG x 2 (9/24/2021)  --now on CVSD and tolerating well       Acute pulmonary edema ([facility] (9/24/2021)    --minimal this AM on CXR, getting lasix     --pa/lat in AM       More than 50% of the time documented was spent in face-to-face contact with the patient and in the care of the patient on the floor/unit where the patient is located. Sana Cuevas NP     I have spoken with and examined the patient. I agree with the above assessment and plan as documented.     Gen: awake, alert, no complains   Lungs:  clear  Heart:  RRR with no Murmur/Rubs/Gallops  Abd:soft, NT  Ext: no LE edema     Doing well, on RA, nothing else to add , will sign off ,call if needed    Theo Olson MD

## 2021-09-26 NOTE — PROGRESS NOTES
Progress Note    Patient: [facility] MRN: [id number]  SSN: [id number]    YOB: 1950  Age: 70 y.o. Sex: male      Admit Date: 9/24/2021    LOS: 2 days   POD 2 s/p:  CORONARY ARTERY BYPASS GRAFT (Flavio Levyna), COFFEY to the LAD, SVG to the OM  Subjective:     Doing well; no major issues. Glucose on high side. Objective:     Vitals:    09/25/21 1802 09/25/21 1930 09/25/21 2330 09/26/21 0320   BP: (!) 100/53 111/69 123/63 117/65   Pulse: 87 97 90 86   Resp: 25 22 24 26   Temp:  99.9 °F (37.7 °C) 99.2 °F (37.3 °C) 100 °F (37.8 °C)   SpO2: 94% 93%  93%   Weight:       Height:            Intake and Output:  Current Shift: 09/25 1901 - 09/26 0700  In: 480 [P.O.:480]  Out: 600 [Urine:600]  Chest tube drainage in 24 hrs: 330  Last three shifts: 09/24 0701 - 09/25 1900  In: 5478.2 [P.O.:1200; I.V.:3428.2]  Out: 3641 [Urine:2315]  Last 3 Recorded Weights in this Encounter    09/24/21 0559 09/24/21 1027 09/25/21 0405   Weight: 122.4 kg (269 lb 12.8 oz) 112.9 kg (249 lb) 124.9 kg (275 lb 5.7 oz)       Intake/Output Summary (Last 24 hours) at 9/26/2021 0413  Last data filed at 9/26/2021 0328  Gross per 24 hour   Intake 2608.24 ml   Output 1605 ml   Net 1003.24 ml     Date 09/25/21 0700 - 09/26/21 0659 09/26/21 0700 - 09/27/21 0659   Shift 8172-0889 7569-6985 24 Hour Total 0797-4572 8660-6823 24 Hour Total   INTAKE   P.O. 5296 737 7880        P. O. 7960 679 5688      I. V.(mL/kg/hr) 140(0.1)  140        I.V. 140  140      Shift Total(mL/kg) 1340(10.7) 480(3.8) 1820(14.6)      OUTPUT   Urine(mL/kg/hr) 495(0.3) 600 1095        Urine Voided 250 600 850        Urine Output (mL) ([REMOVED] Urinary Catheter 09/24/21 2- way; [place] - Temperature) 245  245      Chest Tube 360  360        Output (ml) (Chest Tube #1 09/24/21 Angled;Pleural;Left) 360  360      Shift Total(mL/kg) 855(6.8) 600(4.8) 1455(11.6)       -120 365      Weight (kg) 124.9 124.9 124.9 124.9 124.9 124.9         Physical Exam:   Neuro: Awake and alert. Nonfocal.  Neck:  No JVD  Chest:  Incision clean; sternum intact  Cor:  rrr without murmur, rub, or gallop. Lungs:  Clear to auscultation  Ext:  Incisions clean. Min edema. Lab/Data Review:  BMP:   Lab Results   Component Value Date/Time    K 4.1 09/25/2021 08:10 PM     CMP:   Lab Results   Component Value Date/Time    K 4.1 09/25/2021 08:10 PM    MG 2.3 09/25/2021 08:10 PM     CBC:   Lab Results   Component Value Date/Time    WBC 9.1 09/25/2021 08:10 PM    HGB 11.4 (L) 09/25/2021 08:10 PM    HCT 35.0 (L) 09/25/2021 08:10 PM     (L) 09/25/2021 08:10 PM     All Cardiac Markers in the last 24 hours: No results found for: CPK, CK, CKMMB, CKMB, RCK3, CKMBT, CKNDX, CKND1, UCHE, TROPT, TROIQ, CHERELLE, TROPT, TNIPOC, BNP, BNPP  Recent Glucose Results:   No results found for: GLU  ABG:   No results found for: PH, PHI, PCO2, PCO2I, PO2, PO2I, HCO3, HCO3I, FIO2, FIO2I  COAGS:   No results found for: APTT, PTP, INR, INREXT, INREXT, INREXT  Liver Panel: No results found for: ALB, CBIL, TBIL, TP, GLOB, AGRAT, ASTPOC, ALTPOC, ALT, AP         Assessment:     The patient is presently POD #1 from an emergent CABG. Overall, patient is doing well. Major issues: Pressor dependence    Principal Problem:    [facility]STEM[facility] (non-ST elevated [facility]) [facility] (9/24/2021)    Active Problems:    HTN (hypertension) (9/24/2021)      DM2 (diabetes mellitus, type 2) ([facility] (9/24/2021)      Coronary artery disease involving native coronary artery (9/24/2021)      CAD (coronary artery disease) (9/24/2021)      Encounter for weaning from ventilator ([facility] (9/24/2021)      Hypoxemia (9/24/2021)      S/P CABG x 2 (9/24/2021)      Acute pulmonary edema ([facility] (9/24/2021)        Plan:       Cardiac:  Stable. Respiratory: Wean oxygen. Renal: Stable. GI:  Tolerating diet. Endocrine:  Glucose running high; will ask for hospitalist assistance for management. Neuro:  Stable. Wires: Out.     Tubes: Out.            Signed By: Aida Rojas MD     September 26, 2021

## 2021-09-27 ENCOUNTER — APPOINTMENT (OUTPATIENT)
Dept: GENERAL RADIOLOGY | Age: 71
DRG: 233 | End: 2021-09-27
Attending: NURSE PRACTITIONER
Payer: MEDICARE

## 2021-09-27 LAB
ERYTHROCYTE [DISTWIDTH] IN BLOOD BY AUTOMATED COUNT: 14.3 % (ref 11.9–14.6)
GLUCOSE BLD STRIP.AUTO-MCNC: 139 MG/DL (ref 65–100)
GLUCOSE BLD STRIP.AUTO-MCNC: 156 MG/DL (ref 65–100)
GLUCOSE BLD STRIP.AUTO-MCNC: 162 MG/DL (ref 65–100)
GLUCOSE BLD STRIP.AUTO-MCNC: 196 MG/DL (ref 65–100)
HCT VFR BLD AUTO: 30.3 % (ref 41.1–50.3)
HGB BLD-MCNC: 10 G/DL (ref 13.6–17.2)
MAGNESIUM SERPL-MCNC: 2.3 MG/DL (ref 1.8–2.4)
MCH RBC QN AUTO: 28.4 PG (ref 26.1–32.9)
MCHC RBC AUTO-ENTMCNC: 33 G/DL (ref 31.4–35)
MCV RBC AUTO: 86.1 FL (ref 79.6–97.8)
MM INDURATION POC: 0 MM (ref 0–5)
NRBC # BLD: 0 K/UL (ref 0–0.2)
PLATELET # BLD AUTO: 141 K/UL (ref 150–450)
PMV BLD AUTO: 10.5 FL (ref 9.4–12.3)
POTASSIUM SERPL-SCNC: 4.1 MMOL/L (ref 3.5–5.1)
PPD POC: NEGATIVE NEGATIVE
RBC # BLD AUTO: 3.52 M/UL (ref 4.23–5.6)
SERVICE CMNT-IMP: ABNORMAL
WBC # BLD AUTO: 7.7 K/UL (ref 4.3–11.1)

## 2021-09-27 PROCEDURE — 36415 COLL VENOUS BLD VENIPUNCTURE: CPT

## 2021-09-27 PROCEDURE — 2709999900 HC NON-CHARGEABLE SUPPLY

## 2021-09-27 PROCEDURE — 71046 X-RAY EXAM CHEST 2 VIEWS: CPT

## 2021-09-27 PROCEDURE — 85027 COMPLETE CBC AUTOMATED: CPT

## 2021-09-27 PROCEDURE — 97110 THERAPEUTIC EXERCISES: CPT

## 2021-09-27 PROCEDURE — 82962 GLUCOSE BLOOD TEST: CPT

## 2021-09-27 PROCEDURE — 74011250637 HC RX REV CODE- 250/637: Performed by: THORACIC SURGERY (CARDIOTHORACIC VASCULAR SURGERY)

## 2021-09-27 PROCEDURE — 97530 THERAPEUTIC ACTIVITIES: CPT

## 2021-09-27 PROCEDURE — 83735 ASSAY OF MAGNESIUM: CPT

## 2021-09-27 PROCEDURE — 84132 ASSAY OF SERUM POTASSIUM: CPT

## 2021-09-27 PROCEDURE — 65660000004 HC RM CVT STEPDOWN

## 2021-09-27 PROCEDURE — 74011250636 HC RX REV CODE- 250/636: Performed by: THORACIC SURGERY (CARDIOTHORACIC VASCULAR SURGERY)

## 2021-09-27 PROCEDURE — 74011000258 HC RX REV CODE- 258: Performed by: THORACIC SURGERY (CARDIOTHORACIC VASCULAR SURGERY)

## 2021-09-27 PROCEDURE — 74011636637 HC RX REV CODE- 636/637: Performed by: THORACIC SURGERY (CARDIOTHORACIC VASCULAR SURGERY)

## 2021-09-27 RX ADMIN — AMIODARONE HYDROCHLORIDE 0.5 MG/MIN: 50 INJECTION, SOLUTION INTRAVENOUS at 06:19

## 2021-09-27 RX ADMIN — Medication 10 ML: at 06:14

## 2021-09-27 RX ADMIN — INSULIN LISPRO 2 UNITS: 100 INJECTION, SOLUTION INTRAVENOUS; SUBCUTANEOUS at 17:16

## 2021-09-27 RX ADMIN — OXYCODONE HYDROCHLORIDE AND ACETAMINOPHEN 1 TABLET: 5; 325 TABLET ORAL at 21:04

## 2021-09-27 RX ADMIN — INSULIN LISPRO 2 UNITS: 100 INJECTION, SOLUTION INTRAVENOUS; SUBCUTANEOUS at 12:29

## 2021-09-27 RX ADMIN — Medication 10 ML: at 21:02

## 2021-09-27 RX ADMIN — FAMOTIDINE 20 MG: 20 TABLET, FILM COATED ORAL at 17:14

## 2021-09-27 RX ADMIN — INSULIN LISPRO 2 UNITS: 100 INJECTION, SOLUTION INTRAVENOUS; SUBCUTANEOUS at 10:13

## 2021-09-27 RX ADMIN — METFORMIN HYDROCHLORIDE 850 MG: 850 TABLET ORAL at 17:14

## 2021-09-27 RX ADMIN — FAMOTIDINE 20 MG: 20 TABLET, FILM COATED ORAL at 10:14

## 2021-09-27 RX ADMIN — Medication 10 ML: at 14:02

## 2021-09-27 RX ADMIN — METFORMIN HYDROCHLORIDE 850 MG: 850 TABLET ORAL at 10:14

## 2021-09-27 RX ADMIN — ATORVASTATIN CALCIUM 40 MG: 40 TABLET, FILM COATED ORAL at 20:59

## 2021-09-27 RX ADMIN — METOPROLOL TARTRATE 25 MG: 25 TABLET, FILM COATED ORAL at 20:59

## 2021-09-27 RX ADMIN — SENNOSIDES AND DOCUSATE SODIUM 2 TABLET: 8.6; 5 TABLET ORAL at 20:59

## 2021-09-27 RX ADMIN — METOPROLOL TARTRATE 25 MG: 25 TABLET, FILM COATED ORAL at 10:14

## 2021-09-27 RX ADMIN — Medication 1 AMPULE: at 10:13

## 2021-09-27 RX ADMIN — Medication 1 AMPULE: at 20:58

## 2021-09-27 RX ADMIN — OXYCODONE HYDROCHLORIDE AND ACETAMINOPHEN 1 TABLET: 10; 325 TABLET ORAL at 06:14

## 2021-09-27 RX ADMIN — POTASSIUM CHLORIDE 10 MEQ: 10 TABLET, EXTENDED RELEASE ORAL at 10:14

## 2021-09-27 RX ADMIN — ASPIRIN 81 MG: 81 TABLET, COATED ORAL at 10:14

## 2021-09-27 RX ADMIN — SENNOSIDES AND DOCUSATE SODIUM 2 TABLET: 8.6; 5 TABLET ORAL at 10:14

## 2021-09-27 RX ADMIN — OXYCODONE HYDROCHLORIDE AND ACETAMINOPHEN 1 TABLET: 10; 325 TABLET ORAL at 01:57

## 2021-09-27 RX ADMIN — FUROSEMIDE 40 MG: 20 TABLET ORAL at 10:13

## 2021-09-27 RX ADMIN — OXYCODONE HYDROCHLORIDE AND ACETAMINOPHEN 1 TABLET: 10; 325 TABLET ORAL at 14:01

## 2021-09-27 NOTE — PROGRESS NOTES
CV Progress Note    Subjective: Incisional pain:  moderate  Appetite:  poor  Activity: Ambulating with assistance      Objective:     Vitals:  Blood pressure (!) 106/57, pulse 68, temperature 98.2 °F (36.8 °C), resp. rate 20, height 6' (1.829 m), weight 280 lb 12.8 oz (127.4 kg), SpO2 94 %. Temp (24hrs), Av °F (36.7 °C), Min:97.2 °F (36.2 °C), Max:99.2 °F (37.3 °C)        Plan/Recommendations/Medical Decision Making:     Principal Problem:    NSTEMI (non-ST elevated myocardial infarction) ([facility] (2021)    Active Problems:    HTN (hypertension) (2021)      DM2 (diabetes mellitus, type 2) ([facility] (2021)      Coronary artery disease involving native coronary artery (2021)      CAD (coronary artery disease) (2021)      Encounter for weaning from ventilator ([facility] (2021)      Hypoxemia (2021)      S/P CABG x 2 (2021)      Acute pulmonary edema ([facility] (2021)        Continue present treatment    See orders for details. Joan Serrano.  Marge Medley MD

## 2021-09-27 NOTE — PROGRESS NOTES
ACUTE PHYSICAL THERAPY GOALS:  (Developed with and agreed upon by patient and/or caregiver.)  1. Mr. Ceci Cuellar will perform supine to sit and sit to supine with bed flat and no rails independently in 7 days. 2.  Mr. Ceci Cuellar will perform sit to stand and bed to chair independently in 7 days. 3.  Mr. Ceci Cuellar will perform gait independently >1000 ft in 7 days. 4.  Mr. Ceci Cuellar will go up and down 4 steps with rail independently in 7 days. PHYSICAL THERAPY: Daily Note and AM Treatment Day # 2    Dg Vanegas is a 70 y.o. male   PRIMARY DIAGNOSIS: NSTEMI (non-ST elevated myocardial infarction) ([facility]  NSTEMI (non-ST elevated myocardial infarction) ([facility] [I21.4]  CAD (coronary artery disease) [I25.10]  Procedure(s) (LRB):  CORONARY ARTERY BYPASS GRAFT (CABGX2), LIMA (N/A)  VEIN HARVEST ENDOSCOPIC, GREATER SAPHENOUS VEIN (Left)  ESOPHAGEAL TRANS ECHOCARDIOGRAM (N/A)  3 Days Post-Op    ASSESSMENT:     REHAB RECOMMENDATIONS: CURRENT LEVEL OF FUNCTION:  (Most Recently Demonstrated)   Recommendation to date pending progress:  [address] [place] [address]  Equipment:    None Bed Mobility:   Not tested  Sit to Stand:  [employer] Assistance  Transfers:   Contact Guard Assistance  Gait/Mobility:   Standby Assistance     ASSESSMENT:  Mr. Ceci Cuellar is making good progress toward goals with increased gait distances. He continues to require cueing for safe transfers w/o use of UE. No loss of balance and steady even gait with no AD. Good participation with exercises. SUBJECTIVE:   Mr. Ceci Cuellar states, \"My grandchildren play football too. \"    SOCIAL HISTORY/ LIVING ENVIRONMENT:   Home Environment: Patient room  One/Two Story Residence: One story  Living Alone: No  Support Systems: Spouse/Significant Other  OBJECTIVE:     PAIN: VITAL SIGNS: LINES/DRAINS:   Pre Treatment: Pain Screen  Pain Scale 1: Numeric (0 - 10)  Pain Intensity 1: 0  Post Treatment: 0   Hemovac  O2 Device: None (Room air) MOBILITY: I Mod I S SBA CGA Min Mod Max Total  NT x2 Comments:   Bed Mobility    Rolling [] [] [] [] [] [] [] [] [] [x] []    Supine to Sit [] [] [] [] [] [] [] [] [] [x] []    Scooting [] [] [] [] [] [] [] [] [] [x] []    Sit to Supine [] [] [] [] [] [] [] [] [] [x] []    Transfers    Sit to Stand [] [] [] [x] [x] [] [] [] [] [] []    Bed to Chair [] [] [] [] [] [] [] [] [] [x] []    Stand to Sit [] [] [] [x] [] [] [] [] [] [] []    I=Independent, Mod I=Modified Independent, S=Supervision, SBA=Standby Assistance, CGA=Contact Guard Assistance,   Min=Minimal Assistance, Mod=Moderate Assistance, Max=Maximal Assistance, Total=Total Assistance, NT=Not Tested    BALANCE: Good Fair+ Fair Fair- Poor NT Comments   Sitting Static [x] [] [] [] [] []    Sitting Dynamic [x] [] [] [] [] []              Standing Static [x] [] [] [] [] []    Standing Dynamic [] [x] [] [] [] []      GAIT: I Mod I S SBA CGA Min Mod Max Total  NT x2 Comments:   Level of Assistance [] [] [] [x] [] [] [] [] [] [] []    Distance 200'    DME None    Gait Quality Steady, decreased pace    Weightbearing  Status N/A     I=Independent, Mod I=Modified Independent, S=Supervision, SBA=Standby Assistance, CGA=Contact Guard Assistance,   Min=Minimal Assistance, Mod=Moderate Assistance, Max=Maximal Assistance, Total=Total Assistance, NT=Not Tested    PLAN:   FREQUENCY/DURATION: PT Plan of Care: BID for duration of hospital stay or until stated goals are met, whichever comes first.  TREATMENT:     TREATMENT:   ($$ Therapeutic Activity: 8-22 mins  $$ Therapeutic Exercises: 8-22 mins    )  Therapeutic Activity (14 Minutes): Therapeutic activity included Transfer Training, Ambulation on level ground, Sitting balance  and Standing balance to improve functional Mobility, Strength and Activity tolerance. Therapeutic Exercise (10 Minutes): Therapeutic exercises noted below to improve functional activity tolerance and strength.      TREATMENT GRID:     Date:  9/27 Date:   Date:     ACTIVITY/EXERCISE AM PM AM PM AM PM   Ankle pumps       x10        marching x10        Shoulder shrugs x10        LAQ x10                                   B = bilateral; AA = active assistive; A = active; P = passive    AFTER TREATMENT POSITION/PRECAUTIONS:  Chair, Needs within reach and RN notified    INTERDISCIPLINARY COLLABORATION:  RN/PCT and PT/PTA    TOTAL TREATMENT DURATION:  PT Patient Time In/Time Out  Time In: 0939  Time Out: [address], PTA

## 2021-09-27 NOTE — ROUTINE PROCESS
Bedside and Verbal shift change report given to FRANCHESKA Moralez (oncoming nurse) by self (offgoing nurse). Report included the following information SBAR, Kardex, Intake/Output, MAR, and Recent Results.

## 2021-09-27 NOTE — PROGRESS NOTES
Care Management Interventions  PCP Verified by CM: Yes  Mode of Transport at Discharge: Other (see comment) (daughter)  Transition of Care Consult (CM Consult): Discharge Planning, Home Health  Discharge Durable Medical Equipment: No  Physical Therapy Consult: Yes  Occupational Therapy Consult: No  Speech Therapy Consult: No  Support Systems: Spouse/Significant Other  Confirm Follow Up Transport: Family  The Plan for Transition of Care is Related to the Following Treatment Goals : home  with home health  The Patient and/or Patient Representative was Provided with a Choice of Provider and Agrees with the Discharge Plan?: Yes  Name of the Patient Representative Who was Provided with a Choice of Provider and Agrees with the Discharge Plan: Mr Yeimi Patel of Choice List was Provided with Basic Dialogue that Supports the Patient's Individualized Plan of Care/Goals, Treatment Preferences and Shares the Quality Data Associated with the Providers?: Yes  [place] Resource Information Provided?: No  Discharge Location  Discharge Placement: Home with home health      This CM met with pt this day to complete assessment. Pt verified his PCP, insurance, emergency contact,  And home address. He reports no  Difficulty obtaining his medications in the community. He lives at home with spouse with about 6 steps to enter and no home DME. He confirms  That  At baseline prior to admission he is independent  With his ADLs  Including bathing, dressing, cooking, and  Driving. We discussed discharge planning this day. Pt plans on returning home with spouse when medically stable for discharge. We discussed the role and recommendation of home health at discharge - pt is agreeable to referral.  Reviewed agencies - pt agreeable to [facility]. Referral to be made. No additional CM needs at this time. Will continue to follow and update as needed.

## 2021-09-27 NOTE — ROUTINE PROCESS
Bedside and Verbal shift change report given to self (oncoming nurse) by  Kathy Andersen RN (offgoing nurse). Report included the following information SBAR, Kardex, Intake/Output, MAR, and Recent Results.

## 2021-09-27 NOTE — PROGRESS NOTES
ACUTE PHYSICAL THERAPY GOALS:  (Developed with and agreed upon by patient and/or caregiver.)  1. Mr. Jamie Medina will perform supine to sit and sit to supine with bed flat and no rails independently in 7 days. 2.  Mr. Jamie Medina will perform sit to stand and bed to chair independently in 7 days. 3.  Mr. Jamie Medina will perform gait independently >1000 ft in 7 days. 4.  Mr. Jamie Medina will go up and down 4 steps with rail independently in 7 days. PHYSICAL THERAPY: Daily Note and PM Treatment Day # 2    Lennox Curb is a 70 y.o. male   PRIMARY DIAGNOSIS: NSTEMI (non-ST elevated myocardial infarction) ([facility]  NSTEMI (non-ST elevated myocardial infarction) ([facility] [I21.4]  CAD (coronary artery disease) [I25.10]  Procedure(s) (LRB):  CORONARY ARTERY BYPASS GRAFT (CABGX2), LIMA (N/A)  VEIN HARVEST ENDOSCOPIC, GREATER SAPHENOUS VEIN (Left)  ESOPHAGEAL TRANS ECHOCARDIOGRAM (N/A)  3 Days Post-Op    ASSESSMENT:     REHAB RECOMMENDATIONS: CURRENT LEVEL OF FUNCTION:  (Most Recently Demonstrated)   Recommendation to date pending progress:  [address] [place] [address]  Equipment:    None Bed Mobility:   Minimal Assistance  Sit to Stand:   Standby Assistance  Transfers:   Standby Assistance  Gait/Mobility:   Standby Assistance     ASSESSMENT:  Mr. Jamie Medina is making good progress toward goals with increased gait distances. He continues to require cueing for safe transfers w/o use of UE. No loss of balance and steady even gait with no AD. Good participation with exercises. PM: The patient required min A and cueing for technique on bed mobility. He maintained gait distances this PM and demonstrated improved mobility with transfers. Exercises and up to the chair post treatment. SUBJECTIVE:   Mr. Jamie Medina states, \"I'll stay up for a bit. \"    SOCIAL HISTORY/ LIVING ENVIRONMENT:   Home Environment: Patient room  One/Two Story Residence: One story  Living Alone: No  Support Systems: Spouse/Significant Other  OBJECTIVE:     PAIN: VITAL SIGNS: LINES/DRAINS:   Pre Treatment: Pain Screen  Pain Scale 1: Numeric (0 - 10)  Pain Intensity 1: 2  Post Treatment: 2   Hemovac  O2 Device: None (Room air)     MOBILITY: I Mod I S SBA CGA Min Mod Max Total  NT x2 Comments:   Bed Mobility    Rolling [] [] [] [] [] [] [] [] [] [x] []    Supine to Sit [] [] [] [] [] [] [] [] [] [x] []    Scooting [] [] [] [] [] [] [] [] [] [x] []    Sit to Supine [] [] [] [] [] [] [] [] [] [x] []    Transfers    Sit to Stand [] [] [] [x] [] [] [] [] [] [] []    Bed to Chair [] [] [] [] [] [] [] [] [] [x] []    Stand to Sit [] [] [] [x] [] [] [] [] [] [] []    I=Independent, Mod I=Modified Independent, S=Supervision, SBA=Standby Assistance, CGA=Contact Guard Assistance,   Min=Minimal Assistance, Mod=Moderate Assistance, Max=Maximal Assistance, Total=Total Assistance, NT=Not Tested    BALANCE: Good Fair+ Fair Fair- Poor NT Comments   Sitting Static [x] [] [] [] [] []    Sitting Dynamic [x] [] [] [] [] []              Standing Static [x] [] [] [] [] []    Standing Dynamic [x] [x] [] [] [] []      GAIT: I Mod I S SBA CGA Min Mod Max Total  NT x2 Comments:   Level of Assistance [] [] [] [x] [] [] [] [] [] [] []    Distance 200'    DME None    Gait Quality Steady, decreased pace    Weightbearing  Status N/A     I=Independent, Mod I=Modified Independent, S=Supervision, SBA=Standby Assistance, CGA=Contact Guard Assistance,   Min=Minimal Assistance, Mod=Moderate Assistance, Max=Maximal Assistance, Total=Total Assistance, NT=Not Tested    PLAN:   FREQUENCY/DURATION: PT Plan of Care: BID for duration of hospital stay or until stated goals are met, whichever comes first.  TREATMENT:     TREATMENT:   ($$ Therapeutic Activity: 8-22 mins  $$ Therapeutic Exercises: 8-22 mins    )  Therapeutic Activity (15 Minutes):  Therapeutic activity included Transfer Training, Ambulation on level ground, Sitting balance  and Standing balance to improve functional Mobility, Strength and Activity tolerance. Therapeutic Exercise (10 Minutes): Therapeutic exercises noted below to improve functional activity tolerance and strength.      TREATMENT GRID:     Date:  9/27 Date:   Date:     ACTIVITY/EXERCISE AM PM AM PM AM PM   Ankle pumps       x10 x10       marching x10 x10       Shoulder shrugs x10 x10       LAQ x10 x10                                  B = bilateral; AA = active assistive; A = active; P = passive    AFTER TREATMENT POSITION/PRECAUTIONS:  Chair, Needs within reach and RN notified    INTERDISCIPLINARY COLLABORATION:  RN/PCT and PT/PTA    TOTAL TREATMENT DURATION:  PT Patient Time In/Time Out  Time In: 1410  Time Out: [address]

## 2021-09-28 PROBLEM — I97.89 POSTOPERATIVE ATRIAL FIBRILLATION (HCC): Status: ACTIVE | Noted: 2021-09-28

## 2021-09-28 PROBLEM — I48.91 POSTOPERATIVE ATRIAL FIBRILLATION (HCC): Status: ACTIVE | Noted: 2021-09-28

## 2021-09-28 LAB
ABO + RH BLD: NORMAL
BLD PROD TYP BPU: NORMAL
BLD PROD TYP BPU: NORMAL
BLOOD GROUP ANTIBODIES SERPL: NORMAL
BPU ID: NORMAL
BPU ID: NORMAL
CROSSMATCH RESULT,%XM: NORMAL
CROSSMATCH RESULT,%XM: NORMAL
GLUCOSE BLD STRIP.AUTO-MCNC: 125 MG/DL (ref 65–100)
GLUCOSE BLD STRIP.AUTO-MCNC: 141 MG/DL (ref 65–100)
GLUCOSE BLD STRIP.AUTO-MCNC: 145 MG/DL (ref 65–100)
GLUCOSE BLD STRIP.AUTO-MCNC: 146 MG/DL (ref 65–100)
SERVICE CMNT-IMP: ABNORMAL
SPECIMEN EXP DATE BLD: NORMAL
STATUS OF UNIT,%ST: NORMAL
STATUS OF UNIT,%ST: NORMAL
UNIT DIVISION, %UDIV: 0
UNIT DIVISION, %UDIV: 0

## 2021-09-28 PROCEDURE — 99232 SBSQ HOSP IP/OBS MODERATE 35: CPT | Performed by: INTERNAL MEDICINE

## 2021-09-28 PROCEDURE — 87070 CULTURE OTHR SPECIMN AEROBIC: CPT

## 2021-09-28 PROCEDURE — 97530 THERAPEUTIC ACTIVITIES: CPT

## 2021-09-28 PROCEDURE — 74011250637 HC RX REV CODE- 250/637: Performed by: INTERNAL MEDICINE

## 2021-09-28 PROCEDURE — 65660000004 HC RM CVT STEPDOWN

## 2021-09-28 PROCEDURE — 82962 GLUCOSE BLOOD TEST: CPT

## 2021-09-28 PROCEDURE — 97110 THERAPEUTIC EXERCISES: CPT

## 2021-09-28 PROCEDURE — 74011250637 HC RX REV CODE- 250/637: Performed by: THORACIC SURGERY (CARDIOTHORACIC VASCULAR SURGERY)

## 2021-09-28 PROCEDURE — 2709999900 HC NON-CHARGEABLE SUPPLY

## 2021-09-28 RX ORDER — METOPROLOL SUCCINATE 50 MG/1
50 TABLET, EXTENDED RELEASE ORAL DAILY
Status: DISCONTINUED | OUTPATIENT
Start: 2021-09-29 | End: 2021-09-29 | Stop reason: HOSPADM

## 2021-09-28 RX ORDER — AMIODARONE HYDROCHLORIDE 200 MG/1
200 TABLET ORAL 2 TIMES DAILY
Status: DISCONTINUED | OUTPATIENT
Start: 2021-09-28 | End: 2021-09-29 | Stop reason: HOSPADM

## 2021-09-28 RX ORDER — LISINOPRIL 5 MG/1
10 TABLET ORAL DAILY
Status: DISCONTINUED | OUTPATIENT
Start: 2021-09-28 | End: 2021-09-29 | Stop reason: HOSPADM

## 2021-09-28 RX ADMIN — Medication 10 ML: at 20:38

## 2021-09-28 RX ADMIN — FAMOTIDINE 20 MG: 20 TABLET, FILM COATED ORAL at 16:52

## 2021-09-28 RX ADMIN — AMIODARONE HYDROCHLORIDE 200 MG: 200 TABLET ORAL at 20:38

## 2021-09-28 RX ADMIN — FUROSEMIDE 40 MG: 20 TABLET ORAL at 08:57

## 2021-09-28 RX ADMIN — Medication 1 AMPULE: at 08:56

## 2021-09-28 RX ADMIN — FAMOTIDINE 20 MG: 20 TABLET, FILM COATED ORAL at 08:56

## 2021-09-28 RX ADMIN — METFORMIN HYDROCHLORIDE 850 MG: 850 TABLET ORAL at 08:56

## 2021-09-28 RX ADMIN — Medication 10 ML: at 12:04

## 2021-09-28 RX ADMIN — OXYCODONE HYDROCHLORIDE AND ACETAMINOPHEN 1 TABLET: 5; 325 TABLET ORAL at 06:37

## 2021-09-28 RX ADMIN — Medication 10 ML: at 06:13

## 2021-09-28 RX ADMIN — LISINOPRIL 10 MG: 5 TABLET ORAL at 12:04

## 2021-09-28 RX ADMIN — METOPROLOL TARTRATE 25 MG: 25 TABLET, FILM COATED ORAL at 08:56

## 2021-09-28 RX ADMIN — OXYCODONE HYDROCHLORIDE AND ACETAMINOPHEN 1 TABLET: 5; 325 TABLET ORAL at 19:16

## 2021-09-28 RX ADMIN — SENNOSIDES AND DOCUSATE SODIUM 2 TABLET: 8.6; 5 TABLET ORAL at 08:56

## 2021-09-28 RX ADMIN — OXYCODONE HYDROCHLORIDE AND ACETAMINOPHEN 1 TABLET: 5; 325 TABLET ORAL at 12:10

## 2021-09-28 RX ADMIN — AMIODARONE HYDROCHLORIDE 200 MG: 200 TABLET ORAL at 12:04

## 2021-09-28 RX ADMIN — METFORMIN HYDROCHLORIDE 850 MG: 850 TABLET ORAL at 16:52

## 2021-09-28 RX ADMIN — METOPROLOL TARTRATE 25 MG: 25 TABLET, FILM COATED ORAL at 20:38

## 2021-09-28 RX ADMIN — ATORVASTATIN CALCIUM 40 MG: 40 TABLET, FILM COATED ORAL at 20:38

## 2021-09-28 RX ADMIN — Medication 1 AMPULE: at 20:39

## 2021-09-28 RX ADMIN — OXYCODONE HYDROCHLORIDE AND ACETAMINOPHEN 1 TABLET: 5; 325 TABLET ORAL at 22:57

## 2021-09-28 RX ADMIN — ASPIRIN 81 MG: 81 TABLET, COATED ORAL at 08:56

## 2021-09-28 RX ADMIN — POTASSIUM CHLORIDE 10 MEQ: 10 TABLET, EXTENDED RELEASE ORAL at 08:56

## 2021-09-28 NOTE — PROGRESS NOTES
ACUTE PHYSICAL THERAPY GOALS:  (Developed with and agreed upon by patient and/or caregiver.)  1. Mr. Alaina Ram will perform supine to sit and sit to supine with bed flat and no rails independently in 7 days. 2.  Mr. Alaina Ram will perform sit to stand and bed to chair independently in 7 days. 3.  Mr. Alaina Ram will perform gait independently >1000 ft in 7 days. 4.  Mr. Alaina Ram will go up and down 4 steps with rail independently in 7 days. PHYSICAL THERAPY: Daily Note and PM Treatment Day # 3    Lurdes Flores is a 70 y.o. male   PRIMARY DIAGNOSIS: NSTEMI (non-ST elevated myocardial infarction) ([facility]  NSTEMI (non-ST elevated myocardial infarction) ([facility] [I21.4]  CAD (coronary artery disease) [I25.10]  Procedure(s) (LRB):  CORONARY ARTERY BYPASS GRAFT (CABGX2), LIMA (N/A)  VEIN HARVEST ENDOSCOPIC, GREATER SAPHENOUS VEIN (Left)  ESOPHAGEAL TRANS ECHOCARDIOGRAM (N/A)  4 Days Post-Op    ASSESSMENT:     REHAB RECOMMENDATIONS: CURRENT LEVEL OF FUNCTION:  (Most Recently Demonstrated)   Recommendation to date pending progress:  [address] [place] [address]  Equipment:    None Bed Mobility:   Contact Guard Assistance (bed assist)  Sit to Stand:   Independent  Transfers:   Independent  Gait/Mobility:   Supervision     ASSESSMENT:  Mr. Alaina Ram demonstrated good progress toward goals this session with improved mobility. Increased gait distances and exercises with less time and less rest breaks. Good balance and activity tolerance in standing.       SUBJECTIVE:   Mr. Alaina Ram states, \"I'll give your five minutes back\"    SOCIAL HISTORY/ LIVING ENVIRONMENT:   Home Environment: Patient room  One/Two Story Residence: One story  Living Alone: No  Support Systems: Spouse/Significant Other  OBJECTIVE:     PAIN: VITAL SIGNS: LINES/DRAINS:   Pre Treatment: Pain Screen  Pain Scale 1: Numeric (0 - 10)  Pain Intensity 1: 0  Post Treatment: 0      O2 Device: None (Room air)     MOBILITY: I Mod I S SBA CGA Min Mod Max Total  NT x2 Comments:   Bed Mobility    Rolling [] [] [] [] [x] [] [] [] [] [] []    Supine to Sit [] [] [] [] [x] [] [] [] [] [] []    Scooting [] [] [] [] [x] [] [] [] [] [] []    Sit to Supine [] [] [] [] [] [] [] [] [] [x] []    Transfers    Sit to Stand [x] [] [] [] [] [] [] [] [] [] []    Bed to Chair [] [] [x] [] [] [] [] [] [] [] []    Stand to Sit [x] [] [] [] [] [] [] [] [] [] []    I=Independent, Mod I=Modified Independent, S=Supervision, SBA=Standby Assistance, CGA=Contact Guard Assistance,   Min=Minimal Assistance, Mod=Moderate Assistance, Max=Maximal Assistance, Total=Total Assistance, NT=Not Tested    BALANCE: Good Fair+ Fair Fair- Poor NT Comments   Sitting Static [x] [] [] [] [] []    Sitting Dynamic [x] [] [] [] [] []              Standing Static [x] [] [] [] [] []    Standing Dynamic [x] [x] [] [] [] []      GAIT: I Mod I S SBA CGA Min Mod Max Total  NT x2 Comments:   Level of Assistance [] [] [x] [] [] [] [] [] [] [] []    Distance 400'    DME None    Gait Quality Steady, decreased pace    Weightbearing  Status N/A     I=Independent, Mod I=Modified Independent, S=Supervision, SBA=Standby Assistance, CGA=Contact Guard Assistance,   Min=Minimal Assistance, Mod=Moderate Assistance, Max=Maximal Assistance, Total=Total Assistance, NT=Not Tested    PLAN:   FREQUENCY/DURATION: PT Plan of Care: BID for duration of hospital stay or until stated goals are met, whichever comes first.  TREATMENT:     TREATMENT:   ($$ Therapeutic Activity: 8-22 mins  $$ Therapeutic Exercises: 8-22 mins    )  Therapeutic Activity (117 Minutes): Therapeutic activity included Transfer Training, Ambulation on level ground, Sitting balance  and Standing balance to improve functional Mobility, Strength and Activity tolerance.     TREATMENT GRID:     Date:  9/27 Date:  9/28 Date:     ACTIVITY/EXERCISE AM PM AM PM AM PM   Ankle pumps       x10 x10 x15      marching x10 x10 x15      Shoulder shrugs x10 x10 x15      LAQ x10 x10 x15 Glut sets   x15                        B = bilateral; AA = active assistive; A = active; P = passive    AFTER TREATMENT POSITION/PRECAUTIONS:  Chair, Needs within reach and RN notified    INTERDISCIPLINARY COLLABORATION:  RN/PCT and PT/PTA    TOTAL TREATMENT DURATION:  PT Patient Time In/Time Out  Time In: 1500  Time Out: [address] [facility] [facility], PTA

## 2021-09-28 NOTE — PROGRESS NOTES
[facility] CARDIOLOGY PROGRESS NOTE           9/28/2021 10:07 AM    Admit Date: 9/24/2021      Subjective:   Patient feels well. Sternotomy pain controlled. Working with physical therapy. No recurrent afib over last 24 hours. ROS:  Cardiovascular:  As noted above    Objective:      Vitals:    09/27/21 2312 09/28/21 0313 09/28/21 0642 09/28/21 0714   BP: (!) 153/72 (!) 112/55  125/61   Pulse: 80 73  78   Resp: 19 19 18   Temp: 99.8 °F (37.7 °C) 99.1 °F (37.3 °C)  97.2 °F (36.2 °C)   SpO2: 96% 95%  95%   Weight:   275 lb 3.2 oz (124.8 kg)    Height:           Physical Exam:  General-No Acute Distress  Neck- supple, no JVD  CV- regular rate and rhythm no MRG  Lung- clear bilaterally  Abd- soft, nontender, nondistended  Ext- no edema bilaterally. Skin- warm and dry      Data Review:   Recent Labs     09/27/21  0309 09/26/21  0356   K 4.1 4.3   MG 2.3 2.3   WBC 7.7 8.7   HGB 10.0* 10.5*   HCT 30.3* 32.3*   * 122*      No results found for: KANA Garcia     Echo (9/24/21):    · Echo study was technically difficulty. · LV: Estimated LVEF is 40 - 45%. Normal cavity size and diastolic function. Mild concentric hypertrophy. · LA: Moderately dilated left atrium. · MV: Mild mitral valve regurgitation is present. · TV: Mild tricuspid valve regurgitation is present. Right Ventricular Arterial Pressure (RVSP) is 44 mmHg. · IVC: Moderately elevated central venous pressure (8 mmHg); IVC diameter is larger than 21 mm and collapses more than 50% with respiration. Assessment/Plan:     Principal Problem:    NSTEMI (non-ST elevated myocardial infarction) [facility] (9/24/2021)    Severe LMCA disease requiring urgent CABG    Active Problems:    HTN (hypertension) (9/24/2021)    Change lopressor to Toprol.   Add low dose lisinopril      DM2 (diabetes mellitus, type 2) ([facility] (9/24/2021)    Continue Glucophage and Jardiance      Coronary artery disease involving native coronary artery (9/24/2021)    S/P CABG. Conitnue ASA. Post operative atrial fibrillation  Start PO amiodarone. Monitor on telemetry. May need anticoagulation if recurrence. Ischemic CArdiomyopathy  Change to Toprol. Add Lisinopril.                Penny Dumont MD  9/28/2021 10:07 AM

## 2021-09-28 NOTE — PROGRESS NOTES
ACUTE PHYSICAL THERAPY GOALS:  (Developed with and agreed upon by patient and/or caregiver.)  1. Mr. Maggie Mckeon will perform supine to sit and sit to supine with bed flat and no rails independently in 7 days. 2.  Mr. Maggie Mckeon will perform sit to stand and bed to chair independently in 7 days. 3.  Mr. Maggie Mckeon will perform gait independently >1000 ft in 7 days. 4.  Mr. Maggie Mckeon will go up and down 4 steps with rail independently in 7 days. PHYSICAL THERAPY: Daily Note and AM Treatment Day # 3    Dwayne Whitfield is a 70 y.o. male   PRIMARY DIAGNOSIS: NSTEMI (non-ST elevated myocardial infarction) ([facility] [address]  NSTEMI (non-ST elevated myocardial infarction) ([facility] [address] [I21.4]  CAD (coronary artery disease) [I25.10]  Procedure(s) (LRB):  CORONARY ARTERY BYPASS GRAFT (CABGX2), LIMA (N/A)  VEIN HARVEST ENDOSCOPIC, GREATER SAPHENOUS VEIN (Left)  ESOPHAGEAL TRANS ECHOCARDIOGRAM (N/A)  4 Days Post-Op    ASSESSMENT:     REHAB RECOMMENDATIONS: CURRENT LEVEL OF FUNCTION:  (Most Recently Demonstrated)   Recommendation to date pending progress:  [address] [place] [address]  Equipment:    None Bed Mobility:   Minimal Assistance  Sit to Stand:   Supervision  Transfers:   Supervision  Gait/Mobility:   Supervision     ASSESSMENT:  Mr. Maggie Mckeon continues to make good progress toward goals. Increased gait distances and decreased assistance levels. Minimal cueing for breathing during gait and exercises.       SUBJECTIVE:   Mr. Maggie Mckeon states, \"I'm going in the bathroom first\"    SOCIAL HISTORY/ LIVING ENVIRONMENT:   Home Environment: Patient room  One/Two Story Residence: One story  Living Alone: No  Support Systems: Spouse/Significant Other  OBJECTIVE:     PAIN: VITAL SIGNS: LINES/DRAINS:   Pre Treatment: Pain Screen  Pain Scale 1: Numeric (0 - 10)  Pain Intensity 1: 0  Post Treatment: 0   Hemovac  O2 Device: None (Room air)     MOBILITY: I Mod I S SBA CGA Min Mod Max Total  NT x2 Comments:   Bed Mobility    Rolling [] [] [] [] [] [] [] [] [] [x] []    Supine to Sit [] [] [] [] [] [] [] [] [] [x] []    Scooting [] [] [] [] [] [] [] [] [] [x] []    Sit to Supine [] [] [] [] [] [] [] [] [] [x] []    Transfers    Sit to Stand [] [] [x] [] [] [] [] [] [] [] []    Bed to Chair [] [] [] [] [] [] [] [] [] [x] []    Stand to Sit [] [] [x] [] [] [] [] [] [] [] []    I=Independent, Mod I=Modified Independent, S=Supervision, SBA=Standby Assistance, CGA=Contact Guard Assistance,   Min=Minimal Assistance, Mod=Moderate Assistance, Max=Maximal Assistance, Total=Total Assistance, NT=Not Tested    BALANCE: Good Fair+ Fair Fair- Poor NT Comments   Sitting Static [x] [] [] [] [] []    Sitting Dynamic [x] [] [] [] [] []              Standing Static [x] [] [] [] [] []    Standing Dynamic [x] [x] [] [] [] []      GAIT: I Mod I S SBA CGA Min Mod Max Total  NT x2 Comments:   Level of Assistance [] [] [x] [x] [] [] [] [] [] [] []    Distance 300'    DME None    Gait Quality Steady, decreased pace    Weightbearing  Status N/A     I=Independent, Mod I=Modified Independent, S=Supervision, SBA=Standby Assistance, CGA=Contact Guard Assistance,   Min=Minimal Assistance, Mod=Moderate Assistance, Max=Maximal Assistance, Total=Total Assistance, NT=Not Tested    PLAN:   FREQUENCY/DURATION: PT Plan of Care: BID for duration of hospital stay or until stated goals are met, whichever comes first.  TREATMENT:     TREATMENT:   ($$ Therapeutic Activity: 8-22 mins  $$ Therapeutic Exercises: 8-22 mins    )  Therapeutic Activity (14 Minutes): Therapeutic activity included Transfer Training, Ambulation on level ground, Sitting balance  and Standing balance to improve functional Mobility, Strength and Activity tolerance. Therapeutic Exercise (10 Minutes): Therapeutic exercises noted below to improve functional activity tolerance, strength and mobility.      TREATMENT GRID:     Date:  9/27 Date:  9/28 Date:     ACTIVITY/EXERCISE AM PM AM PM AM PM   Ankle pumps       x10 x10 x15 marching x10 x10 x15      Shoulder shrugs x10 x10 x15      LAQ x10 x10 x15      Glut sets   x15                        B = bilateral; AA = active assistive; A = active; P = passive    AFTER TREATMENT POSITION/PRECAUTIONS:  Chair, Needs within reach and RN notified    INTERDISCIPLINARY COLLABORATION:  RN/PCT and PT/PTA    TOTAL TREATMENT DURATION:  PT Patient Time In/Time Out  Time In: 0950  Time Out: [address], PTA

## 2021-09-28 NOTE — PROGRESS NOTES
CV Progress Note    Subjective: Incisional pain:  moderate  Appetite:  good   Activity: Ambulating with assistance      Objective:     Vitals:  Blood pressure 125/61, pulse 78, temperature 97.2 °F (36.2 °C), resp. rate 18, height 6' (1.829 m), weight 275 lb 3.2 oz (124.8 kg), SpO2 95 %. Temp (24hrs), Av.7 °F (37.1 °C), Min:97.2 °F (36.2 °C), Max:99.8 °F (37.7 °C)        Plan/Recommendations/Medical Decision Making:     Principal Problem:    NSTEMI (non-ST elevated myocardial infarction) ([address] (2021)    Active Problems:    HTN (hypertension) (2021)      DM2 (diabetes mellitus, type 2) ([address] (2021)      Coronary artery disease involving native coronary artery (2021)      CAD (coronary artery disease) (2021)      Encounter for weaning from ventilator ([address] (2021)      Hypoxemia (2021)      S/P CABG x 2 (2021)      Acute pulmonary edema ([address] (2021)      Postoperative atrial fibrillation ([address] (2021)        Continue present treatment    See orders for details. Lady Coe.  Belkis Means MD

## 2021-09-28 NOTE — DISCHARGE INSTRUCTIONS
Patient Education      Coronary Artery Bypass Graft: What to Expect at Home  Your Recovery     Coronary artery bypass graft (CABG) is surgery to treat coronary artery disease. The surgery helps blood make a detour, or bypass, around one or more narrowed or blocked coronary arteries. Coronary arteries are the blood vessels that bring blood to the heart muscle. Your doctor did the surgery through a cut, called an incision, in your chest.  You will feel tired and sore for the first few weeks after surgery. You may have some brief, sharp pains on either side of your chest. Your chest, shoulders, and upper back may ache. These symptoms usually get better after 4 to 6 weeks. The incision in your chest and the area where the healthy blood vessel was taken may be sore or swollen. You will probably be able to do many of your usual activities after 4 to 6 weeks. But for at least 6 weeks, you'll avoid lifting heavy objects and doing activities that strain your chest or upper arm muscles. At first you may notice that you get tired easily and need to rest often. It may take 1 to 2 months to get your energy back. Some people find that they are more emotional after this surgery. You may cry easily or show emotion in ways that are unusual for you. This is common and may last for up to a year. Some people get depressed after the surgery. Talk with your doctor if you have sadness that continues or you are concerned about how you are feeling. Treatment and other support can help you feel better. Even though the surgery may improve your symptoms, you will still follow a heart-healthy lifestyle to help lower your risk of a heart attack or stroke. It will be important to eat a heart-healthy diet, get regular exercise, stay at a healthy weight, manage other health problems, take your medicines, and not smoke.   You may start a cardiac rehabilitation (rehab) program in the hospital. Rashi Roach will continue with this rehab program after you go home to help you recover and prevent problems with your heart. Talk to your doctor about whether rehab is right for you. This care sheet gives you a general idea about how long it will take for you to recover. But each person recovers at a different pace. Follow the steps below to get better as quickly as possible. How can you care for yourself at home? Activity    · Rest when you feel tired. Getting enough sleep will help you recover. Try to sleep on your back while you heal. If your breastbone (sternum) was cut, healing usually takes about 4 to 6 weeks.     · Try to walk each day. Start by walking a little more than you did the day before. Bit by bit, increase the amount you walk. Walking boosts blood flow and helps prevent pneumonia and constipation.     · Avoid strenuous activities, such as bicycle riding, jogging, weight lifting, or heavy aerobic exercise, until your doctor says it is okay.     · For 3 months, avoid activities that strain your chest or upper arm muscles. This includes pushing a  or vacuum, mopping floors, or swinging a golf club or tennis racquet.     · For at least 6 weeks, avoid lifting anything that would make you strain. This may include a child, heavy grocery bags and milk containers, a heavy briefcase or backpack, or cat litter or dog food bags.     · For at least 6 weeks, avoid pushing yourself up out of a bed or chair using your arms. Do not use your arms to pull yourself into or out of a vehicle.     · Hold a pillow firmly over your chest incision when you cough or take deep breaths. This will support your chest and reduce your pain.     · Do breathing exercises at home as instructed by your doctor. This will help prevent pneumonia.     · Ask your doctor when you can drive again.     · You may need to take 4 to 12 weeks off from work. It depends on the type of work you do and how you feel.     · Ask your doctor when it is okay for you to have sex.    Diet    · Eat a heart-healthy diet. If you have not been eating this way, talk to your doctor. You also may want to talk to a dietitian. A dietitian can help you learn about healthy foods.     · Drink plenty of fluids (unless your doctor tells you not to).     · You may notice that your bowel movements are not regular right after your surgery. This is common. Try to avoid constipation and straining with bowel movements. You may want to take a fiber supplement every day. If you have not had a bowel movement after a couple of days, ask your doctor about taking a mild laxative. Medicines    · Your doctor will tell you if and when you can restart your medicines. You will also be given instructions about taking any new medicines.     · If you take aspirin or some other blood thinner, ask your doctor if and when to start taking it again. Make sure that you understand exactly what your doctor wants you to do.     · Be safe with medicines. Take your medicines exactly as prescribed. Call your doctor if you think you are having a problem with your medicine.     · Take pain medicines exactly as directed. ? If the doctor gave you a prescription medicine for pain, take it as prescribed. ? If you are not taking a prescription pain medicine, ask your doctor if you can take an over-the-counter medicine. ? Do not take aspirin, ibuprofen (Advil, Motrin), naproxen (Aleve), or other nonsteroidal anti-inflammatory drugs (NSAIDs) unless your doctor says it is okay.     · If you think your pain medicine is making you sick to your stomach:  ? Take your medicine after meals (unless your doctor has told you not to). ? Ask your doctor for a different pain medicine.     · If your doctor prescribed antibiotics, take them as directed. Do not stop taking them just because you feel better. You need to take the full course of antibiotics.     · Your doctor may give you a blood thinner to prevent blood clots.  If you take a blood thinner, be sure you get instructions about how to take your medicine safely. Blood thinners can cause serious bleeding problems. Incision care    · If you have strips of tape on the incisions the doctor made, leave the tape on for a week or until it falls off.     · Wash the area daily with warm, soapy water, and pat it dry. Don't use hydrogen peroxide or alcohol, which can slow healing. You may cover the area with a gauze bandage if it weeps or rubs against clothing. Change the bandage every day.     · You can take showers with your back to the showerhead. Allow the warm and soapy water to run across your shoulders and down over the incision. Pat the incision dry with a clean towel.     · Do not take a bath for the first 3 weeks, or until your doctor tells you it is okay.     · Do not swim or use a hot tub for at least 1 month, or until your doctor says it is okay.     · Do not use any creams, lotions, powders, ointments, or oils unless your doctor tells you it is okay.     · If a vein was removed from your leg, you can help prevent swelling by:  ? Wearing compression stockings if your doctor recommends them. ? Elevating your legs above the level of your heart whenever you lie down. Other instructions    · Keep track of your weight. Weigh yourself every day at the same time of day, on the same scale, in the same amount of clothing. A sudden increase in weight can be a sign of a problem with your heart. Tell your doctor if you suddenly gain weight, such as 3 pounds or more in 2 to 3 days.     · Do not smoke. Smoking can make it harder for you to recover. And it will raise the chances of your arteries narrowing again. If you need help quitting, talk to your doctor about stop-smoking programs and medicines. These can increase your chances of quitting for good. Follow-up care is a key part of your treatment and safety. Be sure to make and go to all appointments, and call your doctor if you are having problems.  It's also a good idea to know your test results and keep a list of the medicines you take. When should you call for help? Call 911 anytime you think you may need emergency care. For example, call if:    · You passed out (lost consciousness).     · You have severe trouble breathing.     · You have sudden chest pain and shortness of breath, or you cough up blood.     · You have severe pain in your chest.     · You have symptoms of a heart attack. These may include:  ? Chest pain or pressure, or a strange feeling in the chest.  ? Sweating. ? Shortness of breath. ? Nausea or vomiting. ? Pain, pressure, or a strange feeling in the back, neck, jaw, or upper belly or in one or both shoulders or arms. ? Lightheadedness or sudden weakness. ? A fast or irregular heartbeat. After you call 911, the  may tell you to chew 1 adult-strength or 2 to 4 low-dose aspirin. Wait for an ambulance. Do not try to drive yourself.     · You have angina symptoms (such as chest pain or pressure) that do not go away with rest or are not getting better within 5 minutes after you take a dose of nitroglycerin.     · You have symptoms of a stroke. These may include:  ? Sudden numbness, tingling, weakness, or loss of movement in your face, arm, or leg, especially on only one side of your body. ? Sudden vision changes. ? Sudden trouble speaking. ? Sudden confusion or trouble understanding simple statements. ? Sudden problems with walking or balance. ? A sudden, severe headache that is different from past headaches. Call your doctor now or seek immediate medical care if:    · You have pain that does not get better after you take pain medicine.     · You have loose stitches, or your incision comes open.     · You are bleeding a lot from the incision.     · You have signs of infection, such as:  ? Increased pain, swelling, warmth, or redness. ? Red streaks leading from the incision. ? Pus draining from the incision. ?  A fever.     · Your heartbeat feels very fast, skips beats, or flutters.     · You have signs of a blood clot in a leg. If you had a vein removed from your leg, you may have tenderness and swelling while your leg heals. But signs of a blood clot may be in a different part of your leg and may include:  ? Pain in your calf, back of the knee, thigh, or groin. ? Redness and swelling in your leg or groin.     · You have symptoms of heart failure, such as:  ? Swelling in your legs, ankles, or feet. ? Sudden weight gain, such as more than 2 to 3 pounds in a day or 5 pounds in a week. (Your doctor may suggest a different range of weight gain.)     · You are sick to your stomach or cannot keep fluids down. Watch closely for changes in your health, and be sure to contact your doctor if:    · You do not get better as expected. Where can you learn more? Go to http://www.gray.com/  Enter F759 in the search box to learn more about \"Coronary Artery Bypass Graft: What to Expect at Home. \"  Current as of: April 29, 2021               Content Version: 13.0  © 2214-3426 [employer]. Care instructions adapted under license by [employer] (which disclaims liability or warranty for this information). If you have questions about a medical condition or this instruction, always ask your healthcare professional. [place] [address] any warranty or liability for your use of this information. Coronary Artery Disease: Care Instructions  Your Care Instructions     The heart is a muscle, and like any muscle, it needs blood to work well. Coronary artery disease occurs when the arteries that bring oxygen-rich blood to your heart have a buildup of plaque--deposits of fats and other substances. Plaque can reduce blood flow to the heart muscle. This can cause angina symptoms such as chest pain or pressure. A heart attack can happen if blood flow is completely blocked.   You can do a lot to improve your health and prevent a heart attack. Eating healthy food, not smoking, getting regular exercise, and taking your medicine are the main things you can do every day to stay healthy. Follow-up care is a key part of your treatment and safety. Be sure to make and go to all appointments, and call your doctor if you are having problems. It's also a good idea to know your test results and keep a list of the medicines you take. How can you care for yourself at home? Medicines    · Be safe with medicines. Take your medicines exactly as prescribed. Call your doctor if you think you are having a problem with your medicine. You will get more details on the specific medicines your doctor prescribes.     · You will take medicines that lower your risk of a heart attack and lower your risk of dying early from heart disease. These medicines include:  ? Angiotensin-converting enzyme (ACE) inhibitors or angiotensin II receptor blockers (ARBs). They lower blood pressure. ? Aspirin and other blood thinners. They prevent blood clots that could cause a heart attack. ? Beta-blockers. They lower the heart's workload. ? Statins and other cholesterol medicines. They lower cholesterol.     · If your doctor has given you nitroglycerin for angina symptoms (such as chest pain or pressure) keep it with you at all times. If you have symptoms, sit down and rest, and take the first dose of nitroglycerin as directed. If your symptoms get worse or are not getting better within 5 minutes, call 911 right away. Stay on the phone. The emergency  will give you further instructions.     · Do not take any over-the-counter medicines, vitamins, or herbal products without talking to your doctor first.   Lifestyle  Ask your doctor if a cardiac rehab program is right for you. Cardiac rehab can help you make lifestyle changes.  In cardiac rehab, a team of health professionals provides education and support to help you make new, healthy habits.    · Do not smoke. Avoid secondhand smoke too. Smoking can increase your risk of a heart attack or stroke. If you need help quitting, talk to your doctor about stop-smoking programs and medicines. These can increase your chances of quitting for good.     · Eat heart-healthy foods. These include vegetables, fruits, nuts, beans, lean meat, fish, and whole grains. Limit saturated fat, sodium, and alcohol. Limit drinks and foods with added sugar.     · If your doctor recommends it, get more exercise. Ask your doctor what level of exercise is safe for you. Walking is a good choice. Bit by bit, increase the amount you walk every day. Try for at least 30 minutes on most days of the week. You also may want to swim, bike, or do other activities.     · Stay at a healthy weight. Lose weight if you need to.     · Manage other health problems. These include diabetes, high blood pressure, and high cholesterol. If you think you may have a problem with alcohol or drug use, talk to your doctor.     · If you have angina symptoms, pay attention to your symptoms. This can help you see what causes them and what is typical for you.     · Avoid colds and flu. Get a pneumococcal vaccine shot. If you have had one before, ask your doctor whether you need another dose. Get a flu vaccine every year. If you must be around people with colds or flu, wash your hands often.     · If you think you have symptoms of depression, talk to your doctor. Symptoms include feeling sad or hopeless most of the time, or losing interest in activities that used to make you happy. When should you call for help? Call 911 anytime you think you may need emergency care. For example, call if:    · You have symptoms of a heart attack. These may include:  ? Chest pain or pressure, or a strange feeling in the chest.  ? Sweating. ? Shortness of breath. ? Nausea or vomiting.   ? Pain, pressure, or a strange feeling in the back, neck, jaw, or upper belly or in one or both shoulders or arms. ? Lightheadedness or sudden weakness. ? A fast or irregular heartbeat. After you call 911, the  may tell you to chew 1 adult-strength or 2 to 4 low-dose aspirin. Wait for an ambulance. Do not try to drive yourself.     · You have angina symptoms (such as chest pain or pressure) that do not go away with rest or are not getting better within 5 minutes after you take a dose of nitroglycerin.     · You passed out (lost consciousness). Call your doctor now or seek immediate medical care if:    · You are having angina symptoms, such as chest pain or pressure, more often than usual, or they are different or worse than usual.     · You have new or increased shortness of breath.     · You are dizzy or lightheaded, or you feel like you may faint. Watch closely for changes in your health, and be sure to contact your doctor if you have any problems. Where can you learn more? Go to http://www.gray.com/  Enter E797 in the search box to learn more about \"Coronary Artery Disease: Care Instructions. \"  Current as of: April 29, 2021               Content Version: 13.0  © 8935-1772 [employer]. Care instructions adapted under license by [employer] (which disclaims liability or warranty for this information). If you have questions about a medical condition or this instruction, always ask your healthcare professional. [address] any warranty or liability for your use of this information. Heart-Healthy Diet: Care Instructions  Your Care Instructions     A heart-healthy diet has lots of vegetables, fruits, nuts, beans, and whole grains, and is low in salt. It limits foods that are high in saturated fat, such as meats, cheeses, and fried foods. It may be hard to change your diet, but even small changes can lower your risk of heart attack and heart disease. Follow-up care is a key part of your treatment and safety.  Be sure to make and go to all appointments, and call your doctor if you are having problems. It's also a good idea to know your test results and keep a list of the medicines you take. How can you care for yourself at home? Watch your portions  · Use food labels to learn what the recommended servings are for the foods you eat. · Eat only the number of calories you need to stay at a healthy weight. If you need to lose weight, eat fewer calories than your body burns (through exercise and other physical activity). Eat more fruits and vegetables  · Eat a variety of fruit and vegetables every day. Dark green, deep orange, red, or yellow fruits and vegetables are especially good for you. Examples include spinach, carrots, peaches, and berries. · Keep carrots, celery, and other veggies handy for snacks. Buy fruit that is in season and store it where you can see it so that you will be tempted to eat it. · Cook dishes that have a lot of veggies in them, such as stir-fries and soups. Limit saturated fat  · Read food labels, and try to avoid saturated fats. They increase your risk of heart disease. · Use olive or canola oil when you cook. · Bake, broil, grill, or steam foods instead of frying them. · Choose lean meats instead of high-fat meats such as hot dogs and sausages. Cut off all visible fat when you prepare meat. · Eat fish, skinless poultry, and meat alternatives such as soy products instead of high-fat meats. Soy products, such as tofu, may be especially good for your heart. · Choose low-fat or fat-free milk and dairy products. Eat foods high in fiber  · Eat a variety of grain products every day. Include whole-grain foods that have lots of fiber and nutrients. Examples of whole-grain foods include oats, whole wheat bread, and brown rice. · Buy whole-grain breads and cereals, instead of white bread or pastries. Limit salt and sodium  · Limit how much salt and sodium you eat to help lower your blood pressure.   · Taste food before you salt it. Add only a little salt when you think you need it. With time, your taste buds will adjust to less salt. · Eat fewer snack items, fast foods, and other high-salt, processed foods. Check food labels for the amount of sodium in packaged foods. · Choose low-sodium versions of canned goods (such as soups, vegetables, and beans). Limit sugar  · Limit drinks and foods with added sugar. These include candy, desserts, and soda pop. Limit alcohol  · Limit alcohol to no more than 2 drinks a day for men and 1 drink a day for women. Too much alcohol can cause health problems. When should you call for help? Watch closely for changes in your health, and be sure to contact your doctor if:    · You would like help planning heart-healthy meals. Where can you learn more? Go to http://www.montes.com/  Enter V137 in the search box to learn more about \"Heart-Healthy Diet: Care Instructions. \"  Current as of: December 17, 2020               Content Version: 13.0  © 2006-2021 [employer]. Care instructions adapted under license by [employer] (which disclaims liability or warranty for this information). If you have questions about a medical condition or this instruction, always ask your healthcare professional. [address] any warranty or liability for your use of this information.

## 2021-09-28 NOTE — PROGRESS NOTES
Hospitalist Progress Note   Admit Date:  2021  5:47 AM   Name:  Brandon Bryant   Age:  70 y.o. Sex:  male  :  1950   MRN:  [id number]   Room:      Presenting Complaint: No chief complaint on file. Reason(s) for Admission: NSTEMI (non-ST elevated myocardial infarction) ([facility] [I21.4]  CAD (coronary artery disease) [I25.10]     Hospital Course & Interval History:       Mr. Mary Reyes is a 69 yo male admitted s/p CABG with DM2. Subjective (21):     Feels well, glucoses controlled, ate ok     Assessment & Plan:       Principal Problem:    NSTEMI (non-ST elevated myocardial infarction) ([facility] (2021)      Active Problems:    HTN (hypertension) (2021)           Coronary artery disease involving native coronary artery (2021)        CAD (coronary artery disease) (2021)        Encounter for weaning from ventilator ([facility] (2021)         Hypoxemia (2021)         S/P CABG x 2 (2021)        Acute pulmonary edema ([facility] (2021)         DM2 (diabetes mellitus, type 2) ([facility] (2021)  · Continued metformin and Sliding scale  · Resume jardance at discharge   · Glucoses are controlled         Hospital Problems as of 2021 Date Reviewed: 2021        Codes Class Noted - Resolved POA    Postoperative atrial fibrillation ([facility] ICD-10-CM: I97.89, I48.91  ICD-9-CM: 997.1, 427.31  2021 - Present Unknown        * (Principal) NSTEMI (non-ST elevated myocardial infarction) ([facility] ICD-10-CM: I21.4  ICD-9-CM: 410.70  2021 - Present Yes        HTN (hypertension) (Chronic) ICD-10-CM: I10  ICD-9-CM: 401.9  2021 - Present Yes        DM2 (diabetes mellitus, type 2) (HCC) (Chronic) ICD-10-CM: E11.9  ICD-9-CM: 250.00  2021 - Present Yes        Coronary artery disease involving native coronary artery (Chronic) ICD-10-CM: I25.10  ICD-9-CM: 414.01  2021 - Present Yes        CAD (coronary artery disease) ICD-10-CM: I25.10  ICD-9-CM: 414.00 9/24/2021 - Present Unknown        Encounter for weaning from ventilator [facility]) ICD-10-CM: Z99.11  ICD-9-CM: V46.13  9/24/2021 - Present Unknown        Hypoxemia ICD-10-CM: R09.02  ICD-9-CM: 799.02  9/24/2021 - Present Unknown        S/P CABG x 2 ICD-10-CM: Z95.1  ICD-9-CM: V45.81  9/24/2021 - Present Unknown        Acute pulmonary edema ([facility] ICD-10-CM: J81.0  ICD-9-CM: 518.4  9/24/2021 - Present Unknown              Objective:     Patient Vitals for the past 24 hrs:   Temp Pulse Resp BP SpO2   09/28/21 1150 97.6 °F (36.4 °C) 76 20 139/69 96 %   09/28/21 0714 97.2 °F (36.2 °C) 78 18 125/61 95 %   09/28/21 0313 99.1 °F (37.3 °C) 73 19 (!) 112/55 95 %   09/27/21 2312 99.8 °F (37.7 °C) 80 19 (!) 153/72 96 %   09/27/21 2059  83      09/27/21 1935 98.6 °F (37 °C) 76 19 (!) 147/70 97 %   09/27/21 1612 98.9 °F (37.2 °C) 71 20 122/67 96 %     Oxygen Therapy  O2 Sat (%): 96 % (09/28/21 1150)  Pulse via Oximetry: 77 beats per minute (09/28/21 1150)  O2 Device: None (Room air) (09/28/21 1150)  O2 Flow Rate (L/min): 2 l/min (09/25/21 0800)  FIO2 (%): 40 % (09/24/21 2015)    Estimated body mass index is 37.32 kg/m² as calculated from the following:    Height as of this encounter: 6' (1.829 m). Weight as of this encounter: 124.8 kg (275 lb 3.2 oz). Intake/Output Summary (Last 24 hours) at 9/28/2021 1459  Last data filed at 9/28/2021 0745  Gross per 24 hour   Intake 1175 ml   Output 350 ml   Net 825 ml         Physical Exam:     General:    Well nourished. No overt distress, alert and pleasant   Head:  Normocephalic, atraumatic  Eyes:  Sclerae appear normal.  Pupils equally round. ENT:  Nares appear normal, no drainage. Moist oral mucosa  Neck:  No restricted ROM. Trachea midline   CV:   RRR. No m/r/g. No jugular venous distension. Lungs:   CTAB. No wheezing, rhonchi, or rales. Respirations even, unlabored anterior   Abdomen: Bowel sounds present. Soft, nontender, nondistended.   Extremities: No cyanosis or clubbing. No edema  Skin:     No rashes and normal coloration. Warm and dry. Neuro:   grossly intact. Psych:  Normal mood and affect    I have reviewed ordered lab tests and independently visualized imaging below:    Last 24hr Labs:  Recent Results (from the past 24 hour(s))   GLUCOSE, POC    Collection Time: 09/27/21  4:15 PM   Result Value Ref Range    Glucose (POC) 156 (H) 65 - 100 mg/dL    Performed by An    GLUCOSE, POC    Collection Time: 09/27/21  8:18 PM   Result Value Ref Range    Glucose (POC) 139 (H) 65 - 100 mg/dL    Performed by Alla    PLEASE READ & DOCUMENT PPD TEST IN 72 HRS    Collection Time: 09/27/21  9:00 PM   Result Value Ref Range    PPD Negative Negative    mm Induration 0 0 - 5 mm   GLUCOSE, POC    Collection Time: 09/28/21  6:30 AM   Result Value Ref Range    Glucose (POC) 145 (H) 65 - 100 mg/dL    Performed by Alla    GLUCOSE, POC    Collection Time: 09/28/21 11:51 AM   Result Value Ref Range    Glucose (POC) 146 (H) 65 - 100 mg/dL    Performed by Bernadine Gitelman        All Micro Results     None          Other Studies:  No results found.     Current Meds:  Current Facility-Administered Medications   Medication Dose Route Frequency    amiodarone (CORDARONE) tablet 200 mg  200 mg Oral BID    [START ON 9/29/2021] metoprolol succinate (TOPROL-XL) XL tablet 50 mg  50 mg Oral DAILY    lisinopriL (PRINIVIL, ZESTRIL) tablet 10 mg  10 mg Oral DAILY    senna-docusate (PERICOLACE) 8.6-50 mg per tablet 2 Tablet  2 Tablet Oral Q12H    oxyCODONE-acetaminophen (PERCOCET 10)  mg per tablet 1 Tablet  1 Tablet Oral Q4H PRN    insulin lispro (HUMALOG) injection   SubCUTAneous AC&HS    empagliflozin (JARDIANCE) tablet 10 mg  (Patient Supplied)  10 mg Oral DAILY    atorvastatin (LIPITOR) tablet 40 mg  40 mg Oral QHS    sodium chloride (OCEAN) 0.65 % nasal squeeze bottle 2 Spray  2 Spray Both Nostrils Q2H PRN    sodium chloride (NS) flush 5-40 mL  5-40 mL IntraVENous Q8H    sodium chloride (NS) flush 5-40 mL  5-40 mL IntraVENous PRN    alum-mag hydroxide-simeth (MYLANTA) oral suspension 30 mL  30 mL Oral Q4H PRN    famotidine (PEPCID) tablet 20 mg  20 mg Oral BID    acetaminophen (TYLENOL) tablet 650 mg  650 mg Oral Q4H PRN    zolpidem (AMBIEN) tablet 5 mg  5 mg Oral QHS PRN    aspirin delayed-release tablet 81 mg  81 mg Oral DAILY    magnesium oxide (MAG-OX) tablet 400 mg  400 mg Oral TID PRN    magnesium oxide (MAG-OX) tablet 400 mg  400 mg Oral QID PRN    potassium chloride (K-DUR, KLOR-CON) SR tablet 20 mEq  20 mEq Oral BID PRN    potassium chloride (K-DUR, KLOR-CON) SR tablet 40 mEq  40 mEq Oral BID PRN    oxyCODONE-acetaminophen (PERCOCET) 5-325 mg per tablet 1 Tablet  1 Tablet Oral Q4H PRN    metFORMIN (GLUCOPHAGE) tablet 850 mg  850 mg Oral BID WITH MEALS    naloxone (NARCAN) injection 0.4 mg  0.4 mg IntraVENous PRN    metoprolol tartrate (LOPRESSOR) tablet 25 mg  25 mg Oral Q12H    ondansetron (ZOFRAN) injection 4 mg  4 mg IntraVENous Q4H PRN    dextrose (D50W) injection syrg 12.5 g  25 mL IntraVENous PRN    alcohol 62% (NOZIN) nasal  1 Ampule  1 Ampule Topical Q12H       Signed:  Rupali Cook MD    Part of this note may have been written by using a voice dictation software. The note has been proof read but may still contain some grammatical/other typographical errors.

## 2021-09-29 ENCOUNTER — APPOINTMENT (OUTPATIENT)
Dept: GENERAL RADIOLOGY | Age: 71
DRG: 233 | End: 2021-09-29
Attending: PHYSICIAN ASSISTANT
Payer: MEDICARE

## 2021-09-29 VITALS
WEIGHT: 269.4 LBS | BODY MASS INDEX: 36.49 KG/M2 | TEMPERATURE: 97.2 F | RESPIRATION RATE: 22 BRPM | OXYGEN SATURATION: 96 % | SYSTOLIC BLOOD PRESSURE: 124 MMHG | HEIGHT: 72 IN | DIASTOLIC BLOOD PRESSURE: 68 MMHG | HEART RATE: 76 BPM

## 2021-09-29 LAB
GLUCOSE BLD STRIP.AUTO-MCNC: 126 MG/DL (ref 65–100)
GLUCOSE BLD STRIP.AUTO-MCNC: 136 MG/DL (ref 65–100)
SERVICE CMNT-IMP: ABNORMAL
SERVICE CMNT-IMP: ABNORMAL

## 2021-09-29 PROCEDURE — 74011250637 HC RX REV CODE- 250/637: Performed by: THORACIC SURGERY (CARDIOTHORACIC VASCULAR SURGERY)

## 2021-09-29 PROCEDURE — 99231 SBSQ HOSP IP/OBS SF/LOW 25: CPT | Performed by: INTERNAL MEDICINE

## 2021-09-29 PROCEDURE — 74011250637 HC RX REV CODE- 250/637: Performed by: INTERNAL MEDICINE

## 2021-09-29 PROCEDURE — 2709999900 HC NON-CHARGEABLE SUPPLY

## 2021-09-29 PROCEDURE — 77030012890

## 2021-09-29 PROCEDURE — 71046 X-RAY EXAM CHEST 2 VIEWS: CPT

## 2021-09-29 PROCEDURE — 97530 THERAPEUTIC ACTIVITIES: CPT

## 2021-09-29 PROCEDURE — 97110 THERAPEUTIC EXERCISES: CPT

## 2021-09-29 PROCEDURE — 82962 GLUCOSE BLOOD TEST: CPT

## 2021-09-29 RX ORDER — NITROGLYCERIN 0.4 MG/1
0.4 TABLET SUBLINGUAL
Qty: 25 TABLET | Refills: 3 | Status: SHIPPED | OUTPATIENT
Start: 2021-09-29

## 2021-09-29 RX ORDER — OXYCODONE AND ACETAMINOPHEN 5; 325 MG/1; MG/1
1 TABLET ORAL
Qty: 15 TABLET | Refills: 0 | Status: SHIPPED | OUTPATIENT
Start: 2021-09-29 | End: 2021-10-04

## 2021-09-29 RX ORDER — AMOXICILLIN 250 MG
1 CAPSULE ORAL DAILY
Status: DISCONTINUED | OUTPATIENT
Start: 2021-09-29 | End: 2021-09-29 | Stop reason: HOSPADM

## 2021-09-29 RX ORDER — LISINOPRIL 10 MG/1
10 TABLET ORAL
Qty: 30 TABLET | Refills: 2 | Status: ON HOLD | OUTPATIENT
Start: 2021-09-29 | End: 2021-12-05 | Stop reason: SDUPTHER

## 2021-09-29 RX ORDER — ACETAMINOPHEN 325 MG/1
650 TABLET ORAL
Status: SHIPPED | COMMUNITY
Start: 2021-09-29

## 2021-09-29 RX ORDER — OXYCODONE AND ACETAMINOPHEN 5; 325 MG/1; MG/1
1 TABLET ORAL
Status: DISCONTINUED | OUTPATIENT
Start: 2021-09-29 | End: 2021-09-29 | Stop reason: HOSPADM

## 2021-09-29 RX ORDER — AMOXICILLIN AND CLAVULANATE POTASSIUM 875; 125 MG/1; MG/1
1 TABLET, FILM COATED ORAL EVERY 12 HOURS
Qty: 14 TABLET | Refills: 0 | Status: SHIPPED | OUTPATIENT
Start: 2021-09-29 | End: 2021-10-06

## 2021-09-29 RX ADMIN — METOPROLOL SUCCINATE 50 MG: 50 TABLET, EXTENDED RELEASE ORAL at 08:10

## 2021-09-29 RX ADMIN — OXYCODONE HYDROCHLORIDE AND ACETAMINOPHEN 1 TABLET: 10; 325 TABLET ORAL at 05:53

## 2021-09-29 RX ADMIN — Medication 10 ML: at 05:53

## 2021-09-29 RX ADMIN — SENNOSIDES AND DOCUSATE SODIUM 1 TABLET: 8.6; 5 TABLET ORAL at 08:15

## 2021-09-29 RX ADMIN — Medication 1 AMPULE: at 08:10

## 2021-09-29 RX ADMIN — ASPIRIN 81 MG: 81 TABLET, COATED ORAL at 08:10

## 2021-09-29 RX ADMIN — AMIODARONE HYDROCHLORIDE 200 MG: 200 TABLET ORAL at 08:09

## 2021-09-29 RX ADMIN — OXYCODONE HYDROCHLORIDE AND ACETAMINOPHEN 1 TABLET: 5; 325 TABLET ORAL at 13:11

## 2021-09-29 RX ADMIN — FAMOTIDINE 20 MG: 20 TABLET, FILM COATED ORAL at 08:10

## 2021-09-29 RX ADMIN — LISINOPRIL 10 MG: 5 TABLET ORAL at 08:10

## 2021-09-29 RX ADMIN — METFORMIN HYDROCHLORIDE 850 MG: 850 TABLET ORAL at 08:10

## 2021-09-29 NOTE — PROGRESS NOTES
Pt with discharge orders this day. Pt to return home with support from family. Home health referral already made and updated clinicals faxed this day. No additional CM needs at discharge. Milestones met. Care Management Interventions  PCP Verified by CM: Yes  Mode of Transport at Discharge:  Other (see comment) (daughter)  Transition of Care Consult (CM Consult): Discharge Planning, Home Health  Discharge Durable Medical Equipment: No  Physical Therapy Consult: Yes  Occupational Therapy Consult: No  Speech Therapy Consult: No  Support Systems: Spouse/Significant Other  Confirm Follow Up Transport: Family  The Plan for Transition of Care is Related to the Following Treatment Goals : home  with home health  The Patient and/or Patient Representative was Provided with a Choice of Provider and Agrees with the Discharge Plan?: Yes  Name of the Patient Representative Who was Provided with a Choice of Provider and Agrees with the Discharge Plan: Mr Tamir Lopez of Choice List was Provided with Basic Dialogue that Supports the Patient's Individualized Plan of Care/Goals, Treatment Preferences and Shares the Quality Data Associated with the Providers?: Yes  Temperance Resource Information Provided?: No  Discharge Location  Discharge Placement: Home with home health

## 2021-09-29 NOTE — PROGRESS NOTES
CV Progress Note    Subjective: Incisional pain:  moderate  Appetite:  good   Activity: Ambulating well      Objective:     Vitals:  Blood pressure 123/61, pulse 74, temperature 97.3 °F (36.3 °C), resp. rate 20, height 6' (1.829 m), weight 269 lb 6.4 oz (122.2 kg), SpO2 95 %. Temp (24hrs), Av.3 °F (36.8 °C), Min:97.3 °F (36.3 °C), Max:99.5 °F (37.5 °C)        Plan/Recommendations/Medical Decision Making:     Principal Problem:    NSTEMI (non-ST elevated myocardial infarction) ([address] (2021)    Active Problems:    HTN (hypertension) (2021)      DM2 (diabetes mellitus, type 2) ([address] (2021)      Coronary artery disease involving native coronary artery (2021)      CAD (coronary artery disease) (2021)      Encounter for weaning from ventilator ([address] (2021)      Hypoxemia (2021)      S/P CABG x 2 (2021)      Acute pulmonary edema ([address] (2021)      Postoperative atrial fibrillation ([address] (2021)        Continue present treatment  Plan DC today  See orders for details. Levorn Enter.  Beryl Corrigan MD

## 2021-09-29 NOTE — PROGRESS NOTES
ACUTE PHYSICAL THERAPY GOALS:  (Developed with and agreed upon by patient and/or caregiver.)  1. Mr. Alaina Ram will perform supine to sit and sit to supine with bed flat and no rails independently in 7 days. 2.  Mr. Alaina Ram will perform sit to stand and bed to chair independently in 7 days. GOAL MET: 21  3. Mr. Alaina Ram will perform gait independently >1000 ft in 7 days. 4.  Mr. Alaina Ram will go up and down 4 steps with rail independently in 7 days. PHYSICAL THERAPY: Daily Note and AM Treatment Day # 4    Lurdes Flores is a 70 y.o. male   PRIMARY DIAGNOSIS: NSTEMI (non-ST elevated myocardial infarction) ([facility]  NSTEMI (non-ST elevated myocardial infarction) ([facility]) [I21.4]  CAD (coronary artery disease) [I25.10]  Procedure(s) (LRB):  CORONARY ARTERY BYPASS GRAFT (CABGX2), LIMA (N/A)  VEIN HARVEST ENDOSCOPIC, GREATER SAPHENOUS VEIN (Left)  ESOPHAGEAL TRANS ECHOCARDIOGRAM (N/A)  5 Days Post-Op    ASSESSMENT:     REHAB RECOMMENDATIONS: CURRENT LEVEL OF FUNCTION:  (Most Recently Demonstrated)   Recommendation to date pending progress:  [address] [place] [address]  Equipment:    None Bed Mobility:   Contact Guard Assistance (bed assist)  Sit to Stand:   Independent  Transfers:   Independent  Gait/Mobility:   Independent     ASSESSMENT:  Mr. Alaina Ram presents with good mobility and good progress. Maintained gait distances from yesterday at 400' with no AD. No cueing needed for gait technique, minimal cueing for pacing. Good participation with exercises.       SUBJECTIVE:   Mr. Alaina Ram states, \"I don't want to wear you out to much\"    SOCIAL HISTORY/ LIVING ENVIRONMENT:   Home Environment: Patient room  One/Two Story Residence: One story  Living Alone: No  Support Systems: Spouse/Significant Other  OBJECTIVE:     PAIN: VITAL SIGNS: LINES/DRAINS:   Pre Treatment: Pain Screen  Pain Scale 1: Numeric (0 - 10)  Pain Intensity 1: 0  Post Treatment: 0      O2 Device: None (Room air)     MOBILITY: I Mod I S SBA CGA Min Mod Max Total  NT x2 Comments:   Bed Mobility    Rolling [] [] [] [] [] [] [] [] [] [x] []    Supine to Sit [] [] [] [] [] [] [] [] [] [x] []    Scooting [] [] [] [] [] [] [] [] [] [x] []    Sit to Supine [] [] [] [] [] [] [] [] [] [x] []    Transfers    Sit to Stand [x] [] [] [] [] [] [] [] [] [] []    Bed to Chair [] [] [] [] [] [] [] [] [] [x] []    Stand to Sit [x] [] [] [] [] [] [] [] [] [] []    I=Independent, Mod I=Modified Independent, S=Supervision, SBA=Standby Assistance, CGA=Contact Guard Assistance,   Min=Minimal Assistance, Mod=Moderate Assistance, Max=Maximal Assistance, Total=Total Assistance, NT=Not Tested    BALANCE: Good Fair+ Fair Fair- Poor NT Comments   Sitting Static [x] [] [] [] [] []    Sitting Dynamic [x] [] [] [] [] []              Standing Static [x] [] [] [] [] []    Standing Dynamic [x] [x] [] [] [] []      GAIT: I Mod I S SBA CGA Min Mod Max Total  NT x2 Comments:   Level of Assistance [x] [] [] [] [] [] [] [] [] [] []    Distance 400'    DME None    Gait Quality Steady, decreased pace    Weightbearing  Status N/A     I=Independent, Mod I=Modified Independent, S=Supervision, SBA=Standby Assistance, CGA=Contact Guard Assistance,   Min=Minimal Assistance, Mod=Moderate Assistance, Max=Maximal Assistance, Total=Total Assistance, NT=Not Tested    PLAN:   FREQUENCY/DURATION: PT Plan of Care: BID for duration of hospital stay or until stated goals are met, whichever comes first.  TREATMENT:     TREATMENT:   ($$ Therapeutic Activity: 8-22 mins  $$ Therapeutic Exercises: 8-22 mins    )  Therapeutic Activity (16 Minutes): Therapeutic activity included Transfer Training, Ambulation on level ground, Sitting balance  and Standing balance to improve functional Mobility, Strength and Activity tolerance. Therapeutic Exercise (10 Minutes): Therapeutic exercises noted below to improve functional activity tolerance, strength and mobility.      TREATMENT GRID:     Date:  9/27 Date:  9/28 Date:  9/29   ACTIVITY/EXERCISE AM PM AM PM AM PM   Ankle pumps       x10 x10 x15  x30    marching x10 x10 x15  x30    Shoulder shrugs x10 x10 x15  x30    LAQ x10 x10 x15  x30    Glut sets   x15  x30                      B = bilateral; AA = active assistive; A = active; P = passive    AFTER TREATMENT POSITION/PRECAUTIONS:  Chair, Needs within reach and RN notified    INTERDISCIPLINARY COLLABORATION:  RN/PCT and PT/PTA    TOTAL TREATMENT DURATION:  PT Patient Time In/Time Out  Time In: 0915  Time Out: [facility]

## 2021-09-29 NOTE — DISCHARGE SUMMARY
Discharge Summary     Patient ID:  Stacy Ortez  [id number]  89 y.o.  1950    Admit date: 9/24/2021    Discharge date:  9/29/2021    Admitting Physician: Jian Hoskins MD     Discharge Physician: TERESA Foreman/Dr. Bronson Orellana    Admission Diagnoses: NSTEMI (non-ST elevated myocardial infarction) [facility]) [I21.4]  CAD (coronary artery disease) [I25.10]    Discharge Diagnoses:   Patient Active Problem List    Diagnosis Date Noted    Postoperative atrial fibrillation ([facility] 09/28/2021    NSTEMI (non-ST elevated [facility] infarction) ([facility] 09/24/2021    HTN (hypertension) 09/24/2021    DM2 (diabetes mellitus, type 2) ([facility] 09/24/2021    Coronary artery disease involving native coronary artery 09/24/2021    CAD (coronary artery disease) 09/24/2021    Encounter for weaning from ventilator ([facility] 09/24/2021    Hypoxemia 09/24/2021    S/P CABG x 2 09/24/2021    Acute pulmonary edema ([facility] 09/24/2021       Procedures this admission:  Diagnostic left heart catheterization  EchoCardiogram  Coronary Artery Bypass Graft Surgery  Consults: Cardiac Surgery, Pulmonary/Intensive Care, [facility] Course: The patient is a 70 y.o. male who was admitted for NSTEMI. Patient presented to the ER at [facility] with chest pain that started after working out at the gym on Thursday. He continued to have intermittent chest tightness. On arrival to the ER, EKG was abnormal with T wave inversion in inferolateral leads and troponin was elevated consistent with NSTEMI. He has known CAD but had been lost to cardiology follow up. He was admitted and started on IV Heparin. He underwent cardiac catheterization that showed severe multivessel disease with subtotal occlusion of the left main. He denied any active chest pain. Echocardiogram showed reduced LV EF at 40-45% with WMA. He had mild MR and mild TR. He was taken to the OR for emergent CABG surgery.    He underwent CABG X 2 with LIMA to the LAD and SVG to the OM on 9/24/21. Post op, he was hypotensive and continued to require pressor support. He improved and was transferred to CV stepdown. The hospitalist service followed for DM. He was weaned off of O2. He developed atrial fibrillation but converted back to sinus rhythm. He progressed well with PT. He developed a more productive cough. Repeat chest xray was ok. He was afebrile. He was started on antibiotic for probable sinusitis. He was feeling well in the afternoon of 9/29/21 and was determined stable and ready for discharge. Patient was instructed on the importance of medication compliance and outpatient follow up. For maximized medical therapy for CAD, patient will continue ASA, BB, ACE-I and statin as well. For systolic CHF, he will continue BB and ACE-I. Amaryl was stopped. He will continue Metformin and start taking Jardiance. The patient will have close transitional care follow up with [facility] Dr. Jessica Phillip on October 8th at 10:45am THE [facility] [place]). The patient will follow up with Dr. Lianne Augustine on October 19th at 2:20pm and has been referred to cardiac rehab. DISPOSITION: The patient is being discharged home with home health services in stable condition on a low saturated fat, low cholesterol and low salt diet. The patient is instructed to advance activities as tolerated to the limit of fatigue or shortness of breath. The patient is instructed to avoid all heavy lifting or activities that strain the chest or upper arm muscles. Strenuous activity should be avoided. The patient is instructed to watch for signs of infection which include: increasing area of redness, fever or purulent drainage at the incision site. The patient is instructed to call the office or return to the ER for immediate evaluation for any shortness of breath or chest pain not relieved by NTG.     Discharge Exam:   Visit Vitals  /61   Pulse 74   Temp 97.3 °F (36.3 °C)   Resp 20   Ht 6' (1.829 m)   Wt 269 lb 6.4 oz (122.2 kg)   SpO2 95%   BMI 36.54 kg/m²         Physical Exam:  General: Well Developed, Well Nourished, No Acute Distress, Alert & Oriented  Neck: supple, no JVD  Heart: S1S2 with RRR without murmurs or gallops  Lungs: Clear throughout auscultation bilaterally without adventitious sounds  Abd: soft, nontender, nondistended, with good bowel sounds  Ext: warm, no edema  Sternal incision: clean, dry, and intact  Skin: warm and dry      Recent Results (from the past 24 hour(s))   GLUCOSE, POC    Collection Time: 09/28/21 11:51 AM   Result Value Ref Range    Glucose (POC) 146 (H) 65 - 100 mg/dL    Performed by Keily Tran    GLUCOSE, POC    Collection Time: 09/28/21  4:31 PM   Result Value Ref Range    Glucose (POC) 125 (H) 65 - 100 mg/dL    Performed by Narciso Garsia, RESPIRATORY/SPUTUM/BRONCH [facility] STAIN    Collection Time: 09/28/21  5:08 PM    Specimen: Sputum   Result Value Ref Range    Special Requests: NO SPECIAL REQUESTS      GRAM STAIN PENDING     Culture result:        NO GROWTH AFTER SHORT PERIOD OF INCUBATION. FURTHER RESULTS TO FOLLOW AFTER OVERNIGHT INCUBATION. GLUCOSE, POC    Collection Time: 09/28/21  8:24 PM   Result Value Ref Range    Glucose (POC) 141 (H) 65 - 100 mg/dL    Performed by Alla    GLUCOSE, POC    Collection Time: 09/29/21  5:57 AM   Result Value Ref Range    Glucose (POC) 126 (H) 65 - 100 mg/dL    Performed by Alla          Patient Instructions:   Current Discharge Medication List      START taking these medications    Details   acetaminophen (TYLENOL) 325 mg tablet Take 2 Tablets by mouth every six (6) hours as needed for Pain. Start date: 9/29/2021      nitroglycerin (NITROSTAT) 0.4 mg SL tablet 1 Tablet by SubLINGual route every five (5) minutes as needed for Chest Pain. Up to 3 doses.   Qty: 25 Tablet, Refills: 3  Start date: 9/29/2021      amoxicillin-clavulanate (AUGMENTIN) 875-125 mg per tablet Take 1 Tablet by mouth every twelve (12) hours for 7 days. Qty: 14 Tablet, Refills: 0  Start date: 9/29/2021, End date: 10/6/2021      oxyCODONE-acetaminophen (PERCOCET) 5-325 mg per tablet Take 1 Tablet by mouth every six (6) hours as needed for Pain for up to 5 days. Max Daily Amount: 4 Tablets. Qty: 15 Tablet, Refills: 0  Start date: 9/29/2021, End date: 10/4/2021    Associated Diagnoses: S/P CABG x 2         CONTINUE these medications which have CHANGED    Details   lisinopriL (PRINIVIL, ZESTRIL) 10 mg tablet Take 1 Tablet by mouth nightly. Qty: 30 Tablet, Refills: 2  Start date: 9/29/2021         CONTINUE these medications which have NOT CHANGED    Details   empagliflozin (JARDIANCE) 10 mg tablet Take 10 mg by mouth daily. metoprolol succinate (TOPROL-XL) 50 mg XL tablet Take 50 mg by mouth nightly. metFORMIN (GLUCOPHAGE) 850 mg tablet Take 850 mg by mouth two (2) times daily (with meals). aspirin delayed-release 81 mg tablet Take 81 mg by mouth nightly. rosuvastatin (CRESTOR) 20 mg tablet Take 20 mg by mouth nightly.          STOP taking these medications       glimepiride (AMARYL) 4 mg tablet Comments:   Reason for Stopping:

## 2021-09-29 NOTE — PROGRESS NOTES
[facility] CARDIOLOGY PROGRESS NOTE           9/29/2021 11:19 AM    Admit Date: 9/24/2021      Subjective:   No inc cp or sob. Objective:      Vitals:    09/28/21 2247 09/29/21 0312 09/29/21 0623 09/29/21 0706   BP: (!) 104/59 124/61  123/61   Pulse: 69 74  74   Resp: 18 20  20   Temp: 99 °F (37.2 °C) 98.4 °F (36.9 °C)  97.3 °F (36.3 °C)   SpO2: 97% 97%  95%   Weight:   122.2 kg (269 lb 6.4 oz)    Height:           Physical Exam:  General-No Acute Distress    Data Review:   Recent Labs     09/27/21  0309   K 4.1   MG 2.3   WBC 7.7   HGB 10.0*   HCT 30.3*   *       Assessment/Plan:     Principal Problem:    NSTEMI (non-ST elevated myocardial infarction) ([address] (9/24/2021)        Active Problems:    HTN (hypertension) (9/24/2021)          DM2 (diabetes mellitus, type 2) ([address] (9/24/2021)          Coronary artery disease involving native coronary artery (9/24/2021)          CAD (coronary artery disease) (9/24/2021)          Encounter for weaning from ventilator ([address] (9/24/2021)          Hypoxemia (9/24/2021)          S/P CABG x 2 (9/24/2021)          Acute pulmonary edema ([address] (9/24/2021)          Postoperative atrial fibrillation ([address] (9/28/2021)      /////    Looks good post cabg  Will look for fu in the office  On standby.           Stacie Heck MD  9/29/2021 11:19 AM

## 2021-09-29 NOTE — OP NOTES
[facility]  OPERATIVE REPORT    Name:  Ludwig Hollis  MR#:  [id number]  :  1950  ACCOUNT #:  [de-identified]  DATE OF SERVICE:  2021    PREOPERATIVE DIAGNOSES:  Coronary artery occlusive disease, left main coronary stenosis. POSTOPERATIVE DIAGNOSES:  Coronary artery occlusive disease, left main coronary stenosis. PROCEDURE PERFORMED:  Two-vessel coronary artery bypass grafting using reverse saphenous vein graft to the obtuse marginal coronary and left internal mammary artery to the left anterior descending coronary; endoscopic vein harvest; (arterial line placed by Anesthesia); temporary right ventricular pacing wire. SURGEON:  Darian Drew. Debra Garibay MD    ASSISTANT:       ANESTHESIA:  General.    COMPLICATIONS:   .    SPECIMENS REMOVED:   .    IMPLANTS:   .    ESTIMATED BLOOD LOSS:  Minimal.    CARDIOPULMONARY BYPASS TIME:  63 minutes. AORTIC CROSS-CLAMP TIME:  48 minutes. PREOPERATIVE HISTORY:  This is a 63-year-old gentleman who is admitted to the hospital with several days of crescendo chest pain. Cardiac catheterization showed critical ostial and mid left main stenosis and he was taken to the OR from the cardiac cath lab. WHAT WAS FOUND/WHAT WAS DONE:  The heart was exposed through a median sternotomy. The heart was normal in size, contracted well. Two coronaries were grafted. Reverse vein was placed to the circumflex marginal coronary and the internal mammary artery was used to bypass the mid left anterior descending coronary. The patient came off bypass without difficulty. PROCEDURE IN DETAIL:  The patient was premedicated and brought to the operating room. The patient was placed supine on the table. Appropriate monitoring lines were placed including a Allen-Dante catheter and radial artery catheter. General endotracheal anesthesia was given. The anterior body and both legs then prepped with Betadine. The patient was draped into a sterile field.   Saphenous vein was taken with endoscopic vein harvest.  The incision was made in the left leg. The vein was harvested and prepared and the endoscopic incision was closed with subcuticular closure technique. The chest was then opened in the midline. A sternotomy was carried out. The left pleural cavity was opened widely. The mammary artery was taken down. Next, the pericardium was opened vertically and retracted. Heparin at 300 units/kg was given. The patient was then cannulated, placed on bypass and cooled to 32 degrees centigrade. The aorta was then cross-clamped. Blood cardioplegia was given antegrade. The circumflex marginal coronary was identified, opened for a 5-mm length and reverse vein was sutured to this vessel with 7-0 Prolene. Next, the internal mammary artery was sutured directly to the mid left anterior descending coronary with a 7-0 Prolene. A 4-mm button of aortic wall was removed and the proximal end of the vein graft was sutured to the aorta with 6-0 Prolene. Cross-clamp was then released. The patient was rewarmed to 37 degrees centigrade. He was weaned from bypass without difficulty. All blood was then returned to the patient after which cannulas were removed and the pursestring sutures tied. Protamine was then given to reverse the heparin. Temporary right ventricular pacing wire was placed. 32-Croatian tubes left to drain the left chest cavity and the mediastinum. Hemostasis was satisfactory. Sternum was then approximated with interrupted stainless steel wire. Soft tissue was closed with Vicryl and the skin was closed with Vicryl using subcuticular closure technique. The patient tolerated the procedure well, was moved to the intensive care in stable condition. Sponge, needle and instrument counts were correct.       MD LOLIS [id number]  D:  09/29/2021 9:50  T:  09/29/2021 11:04  JOB #:  [id number]

## 2021-09-29 NOTE — PROGRESS NOTES
Discharge instructions, follow up appointments and prescriptions reviewed with patient and family. Both verbalize understanding. All personal belongings taken with patient. Family member will drive patient home. Patient escorted to discharge area via wheelchair. Patient is stable at discharge. PIV, monitor, and CT sutures removed.

## 2021-10-01 LAB
BACTERIA SPEC CULT: NORMAL
GRAM STN SPEC: NORMAL
SERVICE CMNT-IMP: NORMAL

## 2021-10-04 NOTE — PROGRESS NOTES
Physician Progress Note      PATIENT:               Kimo Matos  CSN #:                  [id number]  :                       1950  ADMIT DATE:       2021 5:47 AM  [address] DATE:        2021 3:30 PM  RESPONDING  PROVIDER #:        Luisa MOORE          QUERY TEXT:    Pt admitted with NSTEMI and s/p CABG x2. Pt noted to have acute pulmonary edema and systolic CHF documented in medical record. If possible, please document in the progress notes and discharge summary if you are evaluating and/or treating any of the following: The medical record reflects the following:    Risk Factors: 70 yr, NSTEMI, s/p CABGx2, DM    Clinical Indicators: EF 40-45%, per documentation on PN by pulmonary Acute pulmonary edema, per documentation on the d/c summary For systolic CHF, he will continue BB and ACE-I, cxr on  Findings most consistent with mild pulmonary edema,    Treatment:  IV Lasix, serial chest xrays,  Options provided:  -- Acute pulmonary edema due to Acute Systolic CHF  -- Noncardiogenic acute pulmonary edema  -- Other - I will add my own diagnosis  -- Disagree - Not applicable / Not valid  -- Disagree - Clinically unable to determine / Unknown  -- Refer to Clinical Documentation Reviewer    PROVIDER RESPONSE TEXT:    This patient has acute pulmonary edema due to Acute Systolic CHF.     Query created by: Mariya Nava on 10/4/2021 1:30 PM      Electronically signed by:  Luisa MOORE 10/4/2021 2:16 PM

## 2021-10-28 ENCOUNTER — HOSPITAL ENCOUNTER (OUTPATIENT)
Dept: CARDIAC REHAB | Age: 71
Discharge: HOME | End: 2021-10-28
Attending: PHYSICIAN ASSISTANT

## 2021-10-28 NOTE — CARDIO/PULMONARY
Dear Dr. Ely Moura:  ? Thank you for referring your patient, Carlie Posada (YPY:[id number]), to the Cardiopulmonary Rehabilitation Program at [facility]. Mr. Samantha Augustin is a good candidate for the Cardiac Rehab Program and should see improvements with regular participation. ?  We will be addressing appropriate interventions for modifiable risk factors with your patient during the next 12 weeks. We will contact you with any issues or concerns that may arise, or you can follow your patient's progress through [address] [place] [address] at any time. A final summary will be available on [facility] when the program is completed. Again, thank you for your referral. If we can be of further assistance, please feel free to contact the Cardiopulmonary Rehab staff at [phone].  ? Sincerely,  ?   JACQUE Hatch, RN  Cardiopulmonary Rehabilitation Nurse  [facility]

## 2021-11-04 ENCOUNTER — HOSPITAL ENCOUNTER (OUTPATIENT)
Dept: CARDIAC REHAB | Age: 71
Discharge: HOME | End: 2021-11-04
Payer: MEDICARE

## 2021-11-04 ENCOUNTER — HOSPITAL ENCOUNTER (OUTPATIENT)
Dept: LAB | Age: 71
Discharge: HOME | End: 2021-11-04
Payer: MEDICARE

## 2021-11-04 DIAGNOSIS — I48.91 POSTOPERATIVE ATRIAL FIBRILLATION (HCC): ICD-10-CM

## 2021-11-04 DIAGNOSIS — Z95.1 S/P CABG X 2: ICD-10-CM

## 2021-11-04 DIAGNOSIS — I97.89 POSTOPERATIVE ATRIAL FIBRILLATION (HCC): ICD-10-CM

## 2021-11-04 LAB
BASOPHILS # BLD: 0 K/UL (ref 0–0.2)
BASOPHILS NFR BLD: 1 % (ref 0–2)
DIFFERENTIAL METHOD BLD: ABNORMAL
EOSINOPHIL # BLD: 0.2 K/UL (ref 0–0.8)
EOSINOPHIL NFR BLD: 4 % (ref 0.5–7.8)
ERYTHROCYTE [DISTWIDTH] IN BLOOD BY AUTOMATED COUNT: 15 % (ref 11.9–14.6)
HCT VFR BLD AUTO: 38.6 % (ref 41.1–50.3)
HGB BLD-MCNC: 11.9 G/DL (ref 13.6–17.2)
IMM GRANULOCYTES # BLD AUTO: 0 K/UL (ref 0–0.5)
IMM GRANULOCYTES NFR BLD AUTO: 1 % (ref 0–5)
LYMPHOCYTES # BLD: 1.2 K/UL (ref 0.5–4.6)
LYMPHOCYTES NFR BLD: 27 % (ref 13–44)
MCH RBC QN AUTO: 26.7 PG (ref 26.1–32.9)
MCHC RBC AUTO-ENTMCNC: 30.8 G/DL (ref 31.4–35)
MCV RBC AUTO: 86.5 FL (ref 79.6–97.8)
MONOCYTES # BLD: 0.5 K/UL (ref 0.1–1.3)
MONOCYTES NFR BLD: 11 % (ref 4–12)
NEUTS SEG # BLD: 2.5 K/UL (ref 1.7–8.2)
NEUTS SEG NFR BLD: 58 % (ref 43–78)
NRBC # BLD: 0 K/UL (ref 0–0.2)
PLATELET # BLD AUTO: 293 K/UL (ref 150–450)
PMV BLD AUTO: 10.6 FL (ref 9.4–12.3)
RBC # BLD AUTO: 4.46 M/UL (ref 4.23–5.6)
WBC # BLD AUTO: 4.3 K/UL (ref 4.3–11.1)

## 2021-11-04 PROCEDURE — 36415 COLL VENOUS BLD VENIPUNCTURE: CPT

## 2021-11-04 PROCEDURE — 85025 COMPLETE CBC W/AUTO DIFF WBC: CPT

## 2021-11-04 PROCEDURE — 93798 PHYS/QHP OP CAR RHAB W/ECG: CPT

## 2021-11-05 VITALS — WEIGHT: 260 LBS | BODY MASS INDEX: 35.21 KG/M2 | HEIGHT: 72 IN

## 2021-11-08 ENCOUNTER — HOSPITAL ENCOUNTER (OUTPATIENT)
Dept: CARDIAC REHAB | Age: 71
End: 2021-11-08
Payer: MEDICARE

## 2021-11-10 ENCOUNTER — APPOINTMENT (OUTPATIENT)
Dept: CARDIAC REHAB | Age: 71
End: 2021-11-10
Payer: MEDICARE

## 2021-11-11 ENCOUNTER — APPOINTMENT (OUTPATIENT)
Dept: CARDIAC REHAB | Age: 71
End: 2021-11-11
Payer: MEDICARE

## 2021-11-15 ENCOUNTER — APPOINTMENT (OUTPATIENT)
Dept: CARDIAC REHAB | Age: 71
End: 2021-11-15
Payer: MEDICARE

## 2021-11-17 ENCOUNTER — APPOINTMENT (OUTPATIENT)
Dept: CARDIAC REHAB | Age: 71
End: 2021-11-17
Payer: MEDICARE

## 2021-11-18 ENCOUNTER — APPOINTMENT (OUTPATIENT)
Dept: CARDIAC REHAB | Age: 71
End: 2021-11-18
Payer: MEDICARE

## 2021-11-22 ENCOUNTER — APPOINTMENT (OUTPATIENT)
Dept: CARDIAC REHAB | Age: 71
End: 2021-11-22
Payer: MEDICARE

## 2021-11-24 ENCOUNTER — APPOINTMENT (OUTPATIENT)
Dept: CARDIAC REHAB | Age: 71
End: 2021-11-24
Payer: MEDICARE

## 2021-11-29 ENCOUNTER — APPOINTMENT (OUTPATIENT)
Dept: CARDIAC REHAB | Age: 71
End: 2021-11-29
Payer: MEDICARE

## 2021-12-01 ENCOUNTER — APPOINTMENT (OUTPATIENT)
Dept: CARDIAC REHAB | Age: 71
End: 2021-12-01

## 2021-12-02 ENCOUNTER — APPOINTMENT (OUTPATIENT)
Dept: CARDIAC REHAB | Age: 71
End: 2021-12-02

## 2021-12-03 ENCOUNTER — APPOINTMENT (OUTPATIENT)
Dept: GENERAL RADIOLOGY | Age: 71
DRG: 291 | End: 2021-12-03
Attending: STUDENT IN AN ORGANIZED HEALTH CARE EDUCATION/TRAINING PROGRAM
Payer: MEDICARE

## 2021-12-03 ENCOUNTER — APPOINTMENT (OUTPATIENT)
Dept: CT IMAGING | Age: 71
DRG: 291 | End: 2021-12-03
Attending: STUDENT IN AN ORGANIZED HEALTH CARE EDUCATION/TRAINING PROGRAM
Payer: MEDICARE

## 2021-12-03 ENCOUNTER — HOSPITAL ENCOUNTER (INPATIENT)
Age: 71
LOS: 2 days | Discharge: HOME | DRG: 291 | End: 2021-12-05
Attending: STUDENT IN AN ORGANIZED HEALTH CARE EDUCATION/TRAINING PROGRAM | Admitting: INTERNAL MEDICINE
Payer: MEDICARE

## 2021-12-03 DIAGNOSIS — I50.1 PULMONARY EDEMA CARDIAC CAUSE (HCC): ICD-10-CM

## 2021-12-03 DIAGNOSIS — R06.02 SOB (SHORTNESS OF BREATH): Primary | ICD-10-CM

## 2021-12-03 PROBLEM — J96.00 ACUTE RESPIRATORY FAILURE (HCC): Status: ACTIVE | Noted: 2021-12-03

## 2021-12-03 LAB
ALBUMIN SERPL-MCNC: 3.6 G/DL (ref 3.2–4.6)
ALBUMIN/GLOB SERPL: 0.9 {RATIO} (ref 1.2–3.5)
ALP SERPL-CCNC: 99 U/L (ref 50–136)
ALT SERPL-CCNC: 49 U/L (ref 12–65)
ANION GAP SERPL CALC-SCNC: 8 MMOL/L (ref 7–16)
AST SERPL-CCNC: 64 U/L (ref 15–37)
BASOPHILS # BLD: 0 K/UL (ref 0–0.2)
BASOPHILS NFR BLD: 1 % (ref 0–2)
BILIRUB SERPL-MCNC: 0.6 MG/DL (ref 0.2–1.1)
BNP SERPL-MCNC: 792 PG/ML (ref 5–125)
BUN SERPL-MCNC: 13 MG/DL (ref 8–23)
CALCIUM SERPL-MCNC: 9.3 MG/DL (ref 8.3–10.4)
CHLORIDE SERPL-SCNC: 108 MMOL/L (ref 98–107)
CO2 SERPL-SCNC: 24 MMOL/L (ref 21–32)
CREAT SERPL-MCNC: 0.93 MG/DL (ref 0.8–1.5)
DIFFERENTIAL METHOD BLD: ABNORMAL
EOSINOPHIL # BLD: 0.1 K/UL (ref 0–0.8)
EOSINOPHIL NFR BLD: 3 % (ref 0.5–7.8)
ERYTHROCYTE [DISTWIDTH] IN BLOOD BY AUTOMATED COUNT: 16 % (ref 11.9–14.6)
GLOBULIN SER CALC-MCNC: 4.1 G/DL (ref 2.3–3.5)
GLUCOSE SERPL-MCNC: 243 MG/DL (ref 65–100)
HCT VFR BLD AUTO: 40.1 % (ref 41.1–50.3)
HGB BLD-MCNC: 12.5 G/DL (ref 13.6–17.2)
IMM GRANULOCYTES # BLD AUTO: 0 K/UL (ref 0–0.5)
IMM GRANULOCYTES NFR BLD AUTO: 1 % (ref 0–5)
LYMPHOCYTES # BLD: 0.9 K/UL (ref 0.5–4.6)
LYMPHOCYTES NFR BLD: 20 % (ref 13–44)
MCH RBC QN AUTO: 26.4 PG (ref 26.1–32.9)
MCHC RBC AUTO-ENTMCNC: 31.2 G/DL (ref 31.4–35)
MCV RBC AUTO: 84.8 FL (ref 79.6–97.8)
MONOCYTES # BLD: 0.6 K/UL (ref 0.1–1.3)
MONOCYTES NFR BLD: 12 % (ref 4–12)
NEUTS SEG # BLD: 3 K/UL (ref 1.7–8.2)
NEUTS SEG NFR BLD: 64 % (ref 43–78)
NRBC # BLD: 0 K/UL (ref 0–0.2)
PLATELET # BLD AUTO: 216 K/UL (ref 150–450)
PMV BLD AUTO: 9.2 FL (ref 9.4–12.3)
POTASSIUM SERPL-SCNC: 4.2 MMOL/L (ref 3.5–5.1)
PROT SERPL-MCNC: 7.7 G/DL (ref 6.3–8.2)
RBC # BLD AUTO: 4.73 M/UL (ref 4.23–5.6)
SODIUM SERPL-SCNC: 140 MMOL/L (ref 138–145)
TROPONIN-HIGH SENSITIVITY: 67.6 PG/ML (ref 0–14)
WBC # BLD AUTO: 4.6 K/UL (ref 4.3–11.1)

## 2021-12-03 PROCEDURE — 74011250636 HC RX REV CODE- 250/636: Performed by: STUDENT IN AN ORGANIZED HEALTH CARE EDUCATION/TRAINING PROGRAM

## 2021-12-03 PROCEDURE — 85025 COMPLETE CBC W/AUTO DIFF WBC: CPT

## 2021-12-03 PROCEDURE — 99285 EMERGENCY DEPT VISIT HI MDM: CPT

## 2021-12-03 PROCEDURE — 74011000636 HC RX REV CODE- 636: Performed by: STUDENT IN AN ORGANIZED HEALTH CARE EDUCATION/TRAINING PROGRAM

## 2021-12-03 PROCEDURE — 99223 1ST HOSP IP/OBS HIGH 75: CPT | Performed by: INTERNAL MEDICINE

## 2021-12-03 PROCEDURE — 74011000250 HC RX REV CODE- 250: Performed by: STUDENT IN AN ORGANIZED HEALTH CARE EDUCATION/TRAINING PROGRAM

## 2021-12-03 PROCEDURE — 84484 ASSAY OF TROPONIN QUANT: CPT

## 2021-12-03 PROCEDURE — 96365 THER/PROPH/DIAG IV INF INIT: CPT

## 2021-12-03 PROCEDURE — 74011250637 HC RX REV CODE- 250/637: Performed by: NURSE PRACTITIONER

## 2021-12-03 PROCEDURE — 71260 CT THORAX DX C+: CPT

## 2021-12-03 PROCEDURE — 93005 ELECTROCARDIOGRAM TRACING: CPT | Performed by: STUDENT IN AN ORGANIZED HEALTH CARE EDUCATION/TRAINING PROGRAM

## 2021-12-03 PROCEDURE — 80053 COMPREHEN METABOLIC PANEL: CPT

## 2021-12-03 PROCEDURE — 96375 TX/PRO/DX INJ NEW DRUG ADDON: CPT

## 2021-12-03 PROCEDURE — 74011000250 HC RX REV CODE- 250: Performed by: NURSE PRACTITIONER

## 2021-12-03 PROCEDURE — 74022 RADEX COMPL AQT ABD SERIES: CPT

## 2021-12-03 PROCEDURE — 83880 ASSAY OF NATRIURETIC PEPTIDE: CPT

## 2021-12-03 PROCEDURE — 65270000029 HC RM PRIVATE

## 2021-12-03 RX ORDER — DILTIAZEM HYDROCHLORIDE 5 MG/ML
10 INJECTION INTRAVENOUS ONCE
Status: COMPLETED | OUTPATIENT
Start: 2021-12-03 | End: 2021-12-03

## 2021-12-03 RX ORDER — METOPROLOL SUCCINATE 50 MG/1
50 TABLET, EXTENDED RELEASE ORAL
COMMUNITY
End: 2022-03-16 | Stop reason: SDUPTHER

## 2021-12-03 RX ORDER — FUROSEMIDE 10 MG/ML
20 INJECTION INTRAMUSCULAR; INTRAVENOUS
Status: COMPLETED | OUTPATIENT
Start: 2021-12-03 | End: 2021-12-03

## 2021-12-03 RX ORDER — METOPROLOL SUCCINATE 50 MG/1
50 TABLET, EXTENDED RELEASE ORAL
Status: COMPLETED | OUTPATIENT
Start: 2021-12-03 | End: 2021-12-03

## 2021-12-03 RX ORDER — LISINOPRIL 20 MG/1
40 TABLET ORAL
Status: COMPLETED | OUTPATIENT
Start: 2021-12-03 | End: 2021-12-03

## 2021-12-03 RX ORDER — NITROGLYCERIN 20 MG/100ML
0-200 INJECTION INTRAVENOUS
Status: DISCONTINUED | OUTPATIENT
Start: 2021-12-03 | End: 2021-12-04

## 2021-12-03 RX ADMIN — METOPROLOL SUCCINATE 50 MG: 50 TABLET, EXTENDED RELEASE ORAL at 23:33

## 2021-12-03 RX ADMIN — DILTIAZEM HYDROCHLORIDE 10 MG: 5 INJECTION INTRAVENOUS at 23:41

## 2021-12-03 RX ADMIN — IOPAMIDOL 100 ML: 755 INJECTION, SOLUTION INTRAVENOUS at 23:54

## 2021-12-03 RX ADMIN — LISINOPRIL 40 MG: 20 TABLET ORAL at 23:32

## 2021-12-03 RX ADMIN — NITROGLYCERIN 20 MCG/MIN: 20 INJECTION INTRAVENOUS at 22:45

## 2021-12-03 RX ADMIN — FUROSEMIDE 20 MG: 20 INJECTION, SOLUTION INTRAMUSCULAR; INTRAVENOUS at 22:35

## 2021-12-04 PROBLEM — I50.31 ACUTE DIASTOLIC CHF (CONGESTIVE HEART FAILURE) (HCC): Status: ACTIVE | Noted: 2021-12-04

## 2021-12-04 PROBLEM — I97.89 POSTOPERATIVE ATRIAL FIBRILLATION (HCC): Chronic | Status: ACTIVE | Noted: 2021-09-28

## 2021-12-04 PROBLEM — Z95.1 S/P CABG X 2: Chronic | Status: ACTIVE | Noted: 2021-09-24

## 2021-12-04 PROBLEM — I16.1 HYPERTENSIVE EMERGENCY: Status: ACTIVE | Noted: 2021-12-04

## 2021-12-04 PROBLEM — I48.92 ATRIAL FIBRILLATION AND FLUTTER (HCC): Status: ACTIVE | Noted: 2021-12-04

## 2021-12-04 PROBLEM — I48.91 ATRIAL FIBRILLATION AND FLUTTER (HCC): Status: ACTIVE | Noted: 2021-12-04

## 2021-12-04 PROBLEM — I48.91 POSTOPERATIVE ATRIAL FIBRILLATION (HCC): Chronic | Status: ACTIVE | Noted: 2021-09-28

## 2021-12-04 PROBLEM — J96.00 ACUTE RESPIRATORY FAILURE (HCC): Chronic | Status: ACTIVE | Noted: 2021-12-03

## 2021-12-04 LAB
ANION GAP SERPL CALC-SCNC: 6 MMOL/L (ref 7–16)
ATRIAL RATE: 323 BPM
ATRIAL RATE: 61 BPM
BUN SERPL-MCNC: 12 MG/DL (ref 8–23)
CALCIUM SERPL-MCNC: 9.2 MG/DL (ref 8.3–10.4)
CALCULATED P AXIS, ECG09: 86 DEGREES
CALCULATED R AXIS, ECG10: 64 DEGREES
CALCULATED R AXIS, ECG10: 76 DEGREES
CALCULATED T AXIS, ECG11: -108 DEGREES
CALCULATED T AXIS, ECG11: -124 DEGREES
CHLORIDE SERPL-SCNC: 109 MMOL/L (ref 98–107)
CO2 SERPL-SCNC: 28 MMOL/L (ref 21–32)
CREAT SERPL-MCNC: 0.85 MG/DL (ref 0.8–1.5)
DIAGNOSIS, 93000: NORMAL
DIAGNOSIS, 93000: NORMAL
ERYTHROCYTE [DISTWIDTH] IN BLOOD BY AUTOMATED COUNT: 16 % (ref 11.9–14.6)
GLUCOSE BLD STRIP.AUTO-MCNC: 159 MG/DL (ref 65–100)
GLUCOSE BLD STRIP.AUTO-MCNC: 194 MG/DL (ref 65–100)
GLUCOSE BLD STRIP.AUTO-MCNC: 223 MG/DL (ref 65–100)
GLUCOSE BLD STRIP.AUTO-MCNC: 223 MG/DL (ref 65–100)
GLUCOSE SERPL-MCNC: 215 MG/DL (ref 65–100)
HCT VFR BLD AUTO: 36.4 % (ref 41.1–50.3)
HGB BLD-MCNC: 11.1 G/DL (ref 13.6–17.2)
MAGNESIUM SERPL-MCNC: 1.7 MG/DL (ref 1.8–2.4)
MCH RBC QN AUTO: 25.3 PG (ref 26.1–32.9)
MCHC RBC AUTO-ENTMCNC: 30.5 G/DL (ref 31.4–35)
MCV RBC AUTO: 83.1 FL (ref 79.6–97.8)
NRBC # BLD: 0 K/UL (ref 0–0.2)
P-R INTERVAL, ECG05: 200 MS
PLATELET # BLD AUTO: 238 K/UL (ref 150–450)
PMV BLD AUTO: 9.9 FL (ref 9.4–12.3)
POTASSIUM SERPL-SCNC: 3.6 MMOL/L (ref 3.5–5.1)
Q-T INTERVAL, ECG07: 320 MS
Q-T INTERVAL, ECG07: 406 MS
QRS DURATION, ECG06: 72 MS
QRS DURATION, ECG06: 82 MS
QTC CALCULATION (BEZET), ECG08: 408 MS
QTC CALCULATION (BEZET), ECG08: 462 MS
RBC # BLD AUTO: 4.38 M/UL (ref 4.23–5.6)
SERVICE CMNT-IMP: ABNORMAL
SODIUM SERPL-SCNC: 143 MMOL/L (ref 136–145)
TROPONIN-HIGH SENSITIVITY: 103.4 PG/ML (ref 0–14)
VENTRICULAR RATE, ECG03: 125 BPM
VENTRICULAR RATE, ECG03: 61 BPM
WBC # BLD AUTO: 5.8 K/UL (ref 4.3–11.1)

## 2021-12-04 PROCEDURE — 74011250637 HC RX REV CODE- 250/637: Performed by: NURSE PRACTITIONER

## 2021-12-04 PROCEDURE — 99232 SBSQ HOSP IP/OBS MODERATE 35: CPT | Performed by: INTERNAL MEDICINE

## 2021-12-04 PROCEDURE — 84484 ASSAY OF TROPONIN QUANT: CPT

## 2021-12-04 PROCEDURE — 65660000000 HC RM CCU STEPDOWN

## 2021-12-04 PROCEDURE — 83735 ASSAY OF MAGNESIUM: CPT

## 2021-12-04 PROCEDURE — 74011250636 HC RX REV CODE- 250/636: Performed by: NURSE PRACTITIONER

## 2021-12-04 PROCEDURE — 36415 COLL VENOUS BLD VENIPUNCTURE: CPT

## 2021-12-04 PROCEDURE — 85027 COMPLETE CBC AUTOMATED: CPT

## 2021-12-04 PROCEDURE — 77010033678 HC OXYGEN DAILY

## 2021-12-04 PROCEDURE — 94760 N-INVAS EAR/PLS OXIMETRY 1: CPT

## 2021-12-04 PROCEDURE — 82962 GLUCOSE BLOOD TEST: CPT

## 2021-12-04 PROCEDURE — 74011636637 HC RX REV CODE- 636/637: Performed by: NURSE PRACTITIONER

## 2021-12-04 PROCEDURE — 80048 BASIC METABOLIC PNL TOTAL CA: CPT

## 2021-12-04 PROCEDURE — 74011000258 HC RX REV CODE- 258: Performed by: INTERNAL MEDICINE

## 2021-12-04 PROCEDURE — 74011250636 HC RX REV CODE- 250/636: Performed by: INTERNAL MEDICINE

## 2021-12-04 RX ORDER — ROSUVASTATIN CALCIUM 20 MG/1
20 TABLET, COATED ORAL
Status: DISCONTINUED | OUTPATIENT
Start: 2021-12-04 | End: 2021-12-05 | Stop reason: HOSPADM

## 2021-12-04 RX ORDER — ASPIRIN 81 MG/1
81 TABLET ORAL
Status: DISCONTINUED | OUTPATIENT
Start: 2021-12-04 | End: 2021-12-05 | Stop reason: HOSPADM

## 2021-12-04 RX ORDER — METOPROLOL SUCCINATE 50 MG/1
50 TABLET, EXTENDED RELEASE ORAL DAILY
Status: DISCONTINUED | OUTPATIENT
Start: 2021-12-04 | End: 2021-12-05 | Stop reason: HOSPADM

## 2021-12-04 RX ORDER — SODIUM CHLORIDE 0.9 % (FLUSH) 0.9 %
5-40 SYRINGE (ML) INJECTION AS NEEDED
Status: DISCONTINUED | OUTPATIENT
Start: 2021-12-04 | End: 2021-12-05 | Stop reason: HOSPADM

## 2021-12-04 RX ORDER — ACETAMINOPHEN 325 MG/1
650 TABLET ORAL
Status: DISCONTINUED | OUTPATIENT
Start: 2021-12-04 | End: 2021-12-05 | Stop reason: HOSPADM

## 2021-12-04 RX ORDER — LISINOPRIL 20 MG/1
40 TABLET ORAL
Status: DISCONTINUED | OUTPATIENT
Start: 2021-12-04 | End: 2021-12-05 | Stop reason: HOSPADM

## 2021-12-04 RX ORDER — MAGNESIUM SULFATE HEPTAHYDRATE 40 MG/ML
2 INJECTION, SOLUTION INTRAVENOUS ONCE
Status: COMPLETED | OUTPATIENT
Start: 2021-12-04 | End: 2021-12-04

## 2021-12-04 RX ORDER — LEVALBUTEROL INHALATION SOLUTION 1.25 MG/3ML
1.25 SOLUTION RESPIRATORY (INHALATION)
Status: DISCONTINUED | OUTPATIENT
Start: 2021-12-04 | End: 2021-12-05 | Stop reason: HOSPADM

## 2021-12-04 RX ORDER — POTASSIUM CHLORIDE 20 MEQ/1
40 TABLET, EXTENDED RELEASE ORAL
Status: COMPLETED | OUTPATIENT
Start: 2021-12-04 | End: 2021-12-04

## 2021-12-04 RX ORDER — MORPHINE SULFATE 2 MG/ML
2 INJECTION, SOLUTION INTRAMUSCULAR; INTRAVENOUS
Status: DISCONTINUED | OUTPATIENT
Start: 2021-12-04 | End: 2021-12-05 | Stop reason: HOSPADM

## 2021-12-04 RX ORDER — DOCUSATE SODIUM 100 MG/1
100 CAPSULE, LIQUID FILLED ORAL 2 TIMES DAILY
Status: DISCONTINUED | OUTPATIENT
Start: 2021-12-04 | End: 2021-12-05 | Stop reason: HOSPADM

## 2021-12-04 RX ORDER — NALOXONE HYDROCHLORIDE 0.4 MG/ML
0.4 INJECTION, SOLUTION INTRAMUSCULAR; INTRAVENOUS; SUBCUTANEOUS AS NEEDED
Status: DISCONTINUED | OUTPATIENT
Start: 2021-12-04 | End: 2021-12-05 | Stop reason: HOSPADM

## 2021-12-04 RX ORDER — SODIUM CHLORIDE 0.9 % (FLUSH) 0.9 %
5-40 SYRINGE (ML) INJECTION EVERY 8 HOURS
Status: DISCONTINUED | OUTPATIENT
Start: 2021-12-04 | End: 2021-12-05 | Stop reason: HOSPADM

## 2021-12-04 RX ORDER — INSULIN LISPRO 100 [IU]/ML
INJECTION, SOLUTION INTRAVENOUS; SUBCUTANEOUS
Status: DISCONTINUED | OUTPATIENT
Start: 2021-12-04 | End: 2021-12-05 | Stop reason: HOSPADM

## 2021-12-04 RX ORDER — MAG HYDROX/ALUMINUM HYD/SIMETH 200-200-20
30 SUSPENSION, ORAL (FINAL DOSE FORM) ORAL
Status: DISCONTINUED | OUTPATIENT
Start: 2021-12-04 | End: 2021-12-05 | Stop reason: HOSPADM

## 2021-12-04 RX ORDER — NITROGLYCERIN 0.4 MG/1
0.4 TABLET SUBLINGUAL
Status: DISCONTINUED | OUTPATIENT
Start: 2021-12-04 | End: 2021-12-05 | Stop reason: HOSPADM

## 2021-12-04 RX ADMIN — POTASSIUM CHLORIDE 40 MEQ: 20 TABLET, EXTENDED RELEASE ORAL at 06:27

## 2021-12-04 RX ADMIN — ALUMINUM HYDROXIDE, MAGNESIUM HYDROXIDE, AND SIMETHICONE 30 ML: 200; 200; 20 SUSPENSION ORAL at 01:08

## 2021-12-04 RX ADMIN — FUROSEMIDE 10 MG/HR: 10 INJECTION, SOLUTION INTRAMUSCULAR; INTRAVENOUS at 08:58

## 2021-12-04 RX ADMIN — ASPIRIN 81 MG: 81 TABLET ORAL at 01:06

## 2021-12-04 RX ADMIN — INSULIN LISPRO 2 UNITS: 100 INJECTION, SOLUTION INTRAVENOUS; SUBCUTANEOUS at 08:57

## 2021-12-04 RX ADMIN — DOCUSATE SODIUM 100 MG: 100 CAPSULE, LIQUID FILLED ORAL at 17:05

## 2021-12-04 RX ADMIN — FUROSEMIDE 10 MG/HR: 10 INJECTION, SOLUTION INTRAMUSCULAR; INTRAVENOUS at 17:09

## 2021-12-04 RX ADMIN — Medication 5 ML: at 21:35

## 2021-12-04 RX ADMIN — ROSUVASTATIN CALCIUM 20 MG: 20 TABLET, COATED ORAL at 21:30

## 2021-12-04 RX ADMIN — Medication 10 ML: at 17:03

## 2021-12-04 RX ADMIN — ACETAMINOPHEN 650 MG: 325 TABLET ORAL at 14:22

## 2021-12-04 RX ADMIN — ROSUVASTATIN CALCIUM 20 MG: 20 TABLET, COATED ORAL at 01:06

## 2021-12-04 RX ADMIN — Medication 5 ML: at 05:54

## 2021-12-04 RX ADMIN — RIVAROXABAN 20 MG: 20 TABLET, FILM COATED ORAL at 01:07

## 2021-12-04 RX ADMIN — RIVAROXABAN 20 MG: 20 TABLET, FILM COATED ORAL at 17:04

## 2021-12-04 RX ADMIN — METOPROLOL SUCCINATE 50 MG: 50 TABLET, EXTENDED RELEASE ORAL at 08:58

## 2021-12-04 RX ADMIN — INSULIN LISPRO 2 UNITS: 100 INJECTION, SOLUTION INTRAVENOUS; SUBCUTANEOUS at 21:39

## 2021-12-04 RX ADMIN — DOCUSATE SODIUM 100 MG: 100 CAPSULE, LIQUID FILLED ORAL at 08:58

## 2021-12-04 RX ADMIN — LISINOPRIL 40 MG: 20 TABLET ORAL at 18:49

## 2021-12-04 RX ADMIN — ACETAMINOPHEN 650 MG: 325 TABLET ORAL at 01:23

## 2021-12-04 RX ADMIN — ASPIRIN 81 MG: 81 TABLET ORAL at 21:30

## 2021-12-04 RX ADMIN — INSULIN LISPRO 4 UNITS: 100 INJECTION, SOLUTION INTRAVENOUS; SUBCUTANEOUS at 12:02

## 2021-12-04 RX ADMIN — INSULIN LISPRO 4 UNITS: 100 INJECTION, SOLUTION INTRAVENOUS; SUBCUTANEOUS at 17:04

## 2021-12-04 RX ADMIN — Medication 10 ML: at 01:07

## 2021-12-04 RX ADMIN — MAGNESIUM SULFATE HEPTAHYDRATE 2 G: 2 INJECTION, SOLUTION INTRAVENOUS at 06:27

## 2021-12-04 NOTE — PROGRESS NOTES
Dual skin assessment completed on admission       12/04/21 0100   Skin Integumentary   Skin Integumentary (WDL) X    Pressure  Injury Documentation No Pressure Injury Noted-Pressure Ulcer Prevention Initiated   Skin Color Appropriate for ethnicity   Skin Condition/Temp Dry; Warm   Skin Integrity Intact; Scars (comment); Incision (comment)   Turgor Non-tenting   Hair Growth Present   Nails WDL   Varicosities Absent     Midsternal CABG scar and L leg scar visualized. Sacrum intact. Heels intact. 1+ BLE edema visualized.

## 2021-12-04 NOTE — PROGRESS NOTES
Primary Cardiologist: Dr. Angelique Wu    Primary Care Physician: Dr. Michelle Gannon Physician: Dr. Elías Lowery:     Patient is a 70 y.o. AA male with PMHx of CAD, HTN, HLD, DM II, and PAF who presented to the ED via EMS with c/o acutely worsening SOB. Pt states that when he woke Fri AM he felt a little more SOB than usual. He also reports feeling bloated in his abdomen. He thought this was causing him to be SOB. His wife went to the store and got him some laxatives. He states he was able to have a BM but still felt bloated. Throughout the day h reports feeling OK. However, this evening while walking from the kitchen to the bedroom he became incredibly SOB. EMS was called. On arrival to the ED: Pt with good O2 sats on 6LNC. He was found to have CXR c/w VO and was given 20mg IVP Lasix. He was severely hypertensive with initial /126 with max /130 (+reading 210/156 ? accuracy). He was started on IV NTG gtt at 20mcg/min. He had hsTrop 67.6 and proBNP 792. Cardiology was called for further management. Pt states that prior to today he has been feeling well. Denies recent cough, cold, congestion, F/C/S. Reports that he has been taking BP as directed. However, states that he does not check his BP at home. Reports his Lisinopril was increased from 10mg -> 40mg by his PCP at the last appt in mid-Nov. He reports that he was started on Jardiance. He felt weak and read that weakness could be a SE of this med as well as the Xarelto he was taking. States he stopped taking both meds last Sat. States that since his CABG he has been recovering well. Has been riding his stationary bike at home w/o any CP or SOB and feels his stamina is returning. He denies noting any palpitations. 12/4/2021  Pt states that he feels good this morning. He has no complaints currently denying and CP, SOB, palpitations, dizziness, or weakness.      Past Medical History:   Diagnosis Date    Arrhythmia  CAD (coronary artery disease)     Diabetes (HCC)     GERD (gastroesophageal reflux disease)     Heart failure (HCC)     HTN (hypertension)     Hypercholesteremia     Prediabetes     no meds      ROS:  General: no fever or chills  Neck: no adenopathy  Lungs: no SOB, cough, wheezing, or dyspnea  Heart: no CP, palpitations, or leg swelling  GI: no abdominal pain, N/V/D  Neurological: no dizziness, numbness, or weakness    Objective:       Visit Vitals  /80 (BP 1 Location: Left upper arm)   Pulse (!) 59   Temp 97.2 °F (36.2 °C)   Resp 17   Ht 6' 1\" (1.854 m)   Wt 262 lb 3.2 oz (118.9 kg)   SpO2 96%   BMI 34.59 kg/m²       No intake/output data recorded. 12/02 1901 - 12/04 0700  In: -   Out: 1000 [Urine:1000]    Physical Exam:  General:  NAD, resting comfortably  Neck: supple, no JVD, no carotid bruits  Heart: S1S2 with IRIR without murmurs, rubs or gallops  Lungs: clear to auscultation bilaterally, normal effort on O2, no wheezing or rales  Abd: soft, nontender, nondistended but rotund, +bowel sounds  Ext: warm, no edema, calves supple/nontender, pulses 2+ bilaterally  Neurologic: A&O X 3, moves all 4 equally    ECG: AFib RVR , I/L T-wave inversion    ECHO: 9/24/21  · Echo study was technically difficulty. · LV: Estimated LVEF is 40 - 45%. Normal cavity size and diastolic function. Mild concentric hypertrophy. · LA: Moderately dilated left atrium. · MV: Mild mitral valve regurgitation is present. · TV: Mild tricuspid valve regurgitation is present. Right Ventricular Arterial Pressure (RVSP) is 44 mmHg. · IVC: Moderately elevated central venous pressure (8 mmHg); IVC diameter is larger than 21 mm and collapses more than 50% with respiration. CXR:   IMPRESSION  1. Normal/nonobstructive bowel gas pattern. 2. Bilateral interstitial densities, suspicious for pulmonary edema.     Data Review:      Recent Results (from the past 24 hour(s))   EKG, 12 LEAD, INITIAL    Collection Time: 12/03/21 9:16 PM   Result Value Ref Range    Ventricular Rate 125 BPM    Atrial Rate 323 BPM    QRS Duration 82 ms    Q-T Interval 320 ms    QTC Calculation (Bezet) 462 ms    Calculated R Axis 76 degrees    Calculated T Axis -108 degrees    Diagnosis       Atrial fibrillation  Probable LVH with secondary repol abnrm  Anterior ST elevation, probably due to LVH    Confirmed by Christian Harper MD (), Jitendra Mendez ([id number]) on 12/4/2021 8:00:48 AM     TROPONIN-HIGH SENSITIVITY    Collection Time: 12/03/21  9:32 PM   Result Value Ref Range    Troponin-High Sensitivity 67.6 (H) 0 - 14 pg/mL   METABOLIC PANEL, COMPREHENSIVE    Collection Time: 12/03/21  9:32 PM   Result Value Ref Range    Sodium 140 138 - 145 mmol/L    Potassium 4.2 3.5 - 5.1 mmol/L    Chloride 108 (H) 98 - 107 mmol/L    CO2 24 21 - 32 mmol/L    Anion gap 8 7 - 16 mmol/L    Glucose 243 (H) 65 - 100 mg/dL    BUN 13 8 - 23 MG/DL    Creatinine 0.93 0.8 - 1.5 MG/DL    GFR est AA >60 >60 ml/min/1.73m2    GFR est non-AA >60 >60 ml/min/1.73m2    Calcium 9.3 8.3 - 10.4 MG/DL    Bilirubin, total 0.6 0.2 - 1.1 MG/DL    ALT (SGPT) 49 12 - 65 U/L    AST (SGOT) 64 (H) 15 - 37 U/L    Alk.  phosphatase 99 50 - 136 U/L    Protein, total 7.7 6.3 - 8.2 g/dL    Albumin 3.6 3.2 - 4.6 g/dL    Globulin 4.1 (H) 2.3 - 3.5 g/dL    A-G Ratio 0.9 (L) 1.2 - 3.5     NT-PRO BNP    Collection Time: 12/03/21  9:32 PM   Result Value Ref Range    NT pro- (H) 5 - 125 PG/ML   CBC WITH AUTOMATED DIFF    Collection Time: 12/03/21  9:47 PM   Result Value Ref Range    WBC 4.6 4.3 - 11.1 K/uL    RBC 4.73 4.23 - 5.6 M/uL    HGB 12.5 (L) 13.6 - 17.2 g/dL    HCT 40.1 (L) 41.1 - 50.3 %    MCV 84.8 79.6 - 97.8 FL    MCH 26.4 26.1 - 32.9 PG    MCHC 31.2 (L) 31.4 - 35.0 g/dL    RDW 16.0 (H) 11.9 - 14.6 %    PLATELET 160 370 - 780 K/uL    MPV 9.2 (L) 9.4 - 12.3 FL    ABSOLUTE NRBC 0.00 0.0 - 0.2 K/uL    DF AUTOMATED      NEUTROPHILS 64 43 - 78 %    LYMPHOCYTES 20 13 - 44 %    MONOCYTES 12 4.0 - 12.0 %    EOSINOPHILS 3 0.5 - 7.8 %    BASOPHILS 1 0.0 - 2.0 %    IMMATURE GRANULOCYTES 1 0.0 - 5.0 %    ABS. NEUTROPHILS 3.0 1.7 - 8.2 K/UL    ABS. LYMPHOCYTES 0.9 0.5 - 4.6 K/UL    ABS. MONOCYTES 0.6 0.1 - 1.3 K/UL    ABS. EOSINOPHILS 0.1 0.0 - 0.8 K/UL    ABS. BASOPHILS 0.0 0.0 - 0.2 K/UL    ABS. IMM.  GRANS. 0.0 0.0 - 0.5 K/UL   EKG, 12 LEAD, SUBSEQUENT    Collection Time: 12/04/21  1:15 AM   Result Value Ref Range    Ventricular Rate 61 BPM    Atrial Rate 61 BPM    P-R Interval 200 ms    QRS Duration 72 ms    Q-T Interval 406 ms    QTC Calculation (Bezet) 408 ms    Calculated P Axis 86 degrees    Calculated R Axis 64 degrees    Calculated T Axis -124 degrees    Diagnosis       Atrial flutter  Anterior infarct , age undetermined  ST & T wave abnormality, consider inferolateral ischemia  Abnormal ECG  When compared with ECG of 03-DEC-2021 21:16,  PREVIOUS ECG IS PRESENT  Confirmed by Titus Lazo MD (), SANIYA GASPAR ([id number]) on 12/4/2021 9:34:17 AM     METABOLIC PANEL, BASIC    Collection Time: 12/04/21  1:31 AM   Result Value Ref Range    Sodium 143 136 - 145 mmol/L    Potassium 3.6 3.5 - 5.1 mmol/L    Chloride 109 (H) 98 - 107 mmol/L    CO2 28 21 - 32 mmol/L    Anion gap 6 (L) 7 - 16 mmol/L    Glucose 215 (H) 65 - 100 mg/dL    BUN 12 8 - 23 MG/DL    Creatinine 0.85 0.8 - 1.5 MG/DL    GFR est AA >60 >60 ml/min/1.73m2    GFR est non-AA >60 >60 ml/min/1.73m2    Calcium 9.2 8.3 - 10.4 MG/DL   MAGNESIUM    Collection Time: 12/04/21  1:31 AM   Result Value Ref Range    Magnesium 1.7 (L) 1.8 - 2.4 mg/dL   CBC W/O DIFF    Collection Time: 12/04/21  1:31 AM   Result Value Ref Range    WBC 5.8 4.3 - 11.1 K/uL    RBC 4.38 4.23 - 5.6 M/uL    HGB 11.1 (L) 13.6 - 17.2 g/dL    HCT 36.4 (L) 41.1 - 50.3 %    MCV 83.1 79.6 - 97.8 FL    MCH 25.3 (L) 26.1 - 32.9 PG    MCHC 30.5 (L) 31.4 - 35.0 g/dL    RDW 16.0 (H) 11.9 - 14.6 %    PLATELET 724 611 - 539 K/uL    MPV 9.9 9.4 - 12.3 FL    ABSOLUTE NRBC 0.00 0.0 - 0.2 K/uL   TROPONIN-HIGH SENSITIVITY    Collection Time: 12/04/21  1:31 AM   Result Value Ref Range    Troponin-High Sensitivity 103.4 (HH) 0 - 14 pg/mL   GLUCOSE, POC    Collection Time: 12/04/21  7:59 AM   Result Value Ref Range    Glucose (POC) 194 (H) 65 - 100 mg/dL    Performed by Matilde          Assessment/Plan:   Principal Problem:    Acute respiratory failure  - Likely 2/2 acute HTN, improved significantly in ED with IV Lasix and BP control  - CT chest shows CHF type findings (moderate bilateral pleural effusions, mild pulmonary edema, and cardiomegaly)  - Cont O2 and wean as tolerated, nebs PRN    Active Problems:    DM2 (diabetes mellitus, type 2)      Coronary artery disease involving native coronary artery  - S/p CABG  - Cont ASA, beta blocker, ACEi and statin      S/P CABG x 2  - As above      Atrial fibrillation and flutter  - Rate already improved with diuresis and BP control. Unclear how long pt has been out of rhythm or if arrhythmia precipitated by acute respiratory failure (likely the case). - Continue Xarelto      Hypertensive emergency  - BP much better controlled. Currently 130/80. - The current medical regimen is effective; continue present plan and medications. Acute diastolic CHF (congestive heart failure)  - Likely due to severe HTN. Continue home BB and ACEi, on IV NTG and given IVP Lasix in ED, monitor response, maximize meds as clinically indicated.

## 2021-12-04 NOTE — ED TRIAGE NOTES
Patient brought in by ems due to SOB. Patient reports his SOB started suddenly this evening. He reports he was walking to the kitchen and came back to the bedroom and was SOB. He reports that yesterday he was riding a bike for 30-45 minutes without SOB.

## 2021-12-04 NOTE — ROUTINE PROCESS
TRANSFER - IN REPORT:    Verbal report received from Merry Lubin RN on Lila Fitzgeraldan being received from ER for routine progression of care      Report consisted of patients Situation, Background, Assessment and Recommendations(SBAR). Information from the following report(s) SBAR, Kardex, ED Summary, MAR, Recent Results and Cardiac Rhythm A Fib was reviewed with the receiving nurse. Opportunity for questions and clarification was provided. Assessment to be completed upon patients arrival to unit and care assumed.

## 2021-12-04 NOTE — ROUTINE PROCESS
Verbal bedside report [facility] oncoming [facility]. Patient's situation, background, assessment and recommendations provided. Opportunity for questions provided. Oncoming RN assumed care of patient.

## 2021-12-04 NOTE — H&P
[facility] CARDIOLOGY   History & Physical                 Primary Cardiologist: Dr. Kailey Menard    Primary Care Physician: Dr. Ulysses Jungling Physician: Dr. Horacio Swain:     Patient is a 70 y.o. AA male with PMHx of CAD, HTN, HLD, DM II, and PAF who presented to the ED via EMS with c/o acutely worsening SOB. Pt states that when he woke Fri AM he felt a little more SOB than usual. He also reports feeling bloated in his abdomen. He thought this was causing him to be SOB. His wife went to the store and got him some laxatives. He states he was able to have a BM but still felt bloated. Throughout the day h reports feeling OK. However, this evening while walking from the kitchen to the bedroom he became incredibly SOB. EMS was called. On arrival to the ED: Pt with good O2 sats on 6LNC. He was found to have CXR c/w VO and was given 20mg IVP Lasix. He was severely hypertensive with initial /126 with max /130 (+reading 210/156 ? accuracy). He was started on IV NTG gtt at 20mcg/min. He had hsTrop 67.6 and proBNP 792. Cardiology was called for further management. Pt states that prior to today he has been feeling well. Denies recent cough, cold, congestion, F/C/S. Reports that he has been taking BP as directed. However, states that he does not check his BP at home. Reports his Lisinopril was increased from 10mg -> 40mg by his PCP at the last appt in mid-Nov. He reports that he was started on Jardiance. He felt weak and read that weakness could be a SE of this med as well as the Xarelto he was taking. States he stopped taking both meds last Sat. States that since his CABG he has been recovering well. Has been riding his stationary bike at home w/o any CP or SOB and feels his stamina is returning. He denies noting any palpitations.          Past Medical History:   Diagnosis Date    Arrhythmia     CAD (coronary artery disease)     Diabetes ([address]     GERD (gastroesophageal reflux disease)     Heart failure (HCC)     HTN (hypertension)     Hypercholesteremia     Prediabetes     no meds      Past Surgical History:   Procedure Laterality Date    HX KNEE ARTHROSCOPY      to L    HX PTCA  2000    stents x 2    HX SHOULDER ARTHROSCOPY      to L      No Known Allergies  Social History     Tobacco Use    Smoking status: Never Smoker    Smokeless tobacco: Never Used   Substance Use Topics    Alcohol use: No      FH: No family history on file. Review of Systems  General: no acute weight change, no weakness, no fatigue, no fever or chills, no diaphoresis  Skin: no rashes, lumps, wounds, sores or other skin changes  HEENT: no headache, dizziness, lightheadedness, vision changes, hearing changes, tinnitus, vertigo, sinus pressure/pain, bleeding gums, sore throat, or hoarseness  Neck: no swollen glands, goiter, pain or stiffness  Respiratory: no cough, no congestion, no sputum, no hemoptysis, ++dyspnea, no wheezing  Cardiovascular: + as per HPI, no palpitations, syncope  Gastrointestinal: no increased reflux, no constipation, diarrhea, liver problems, GI bleeding, N/V, no abdominal pain, +bloating/gas  Urinary: no frequency, urgency, hematuria, burning/pain with urination, flank pain, polyuria, nocturia, or difficulty urinating  Peripheral Vascular: no claudication, leg cramps, prior DVTs, swelling of BLE, color change, or swelling with redness or tenderness  Musculoskeletal: no muscle or joint pain/stiffness, joint swelling, erythema of joints, or back pain  Psychiatric: no increased depression, anxiety or excessive stress  Neurological: no sensory or motor loss, seizures, syncope, tremors, numbness, tingling, no changes in mood, attention, or speech, no changes in orientation, memory, insight, or judgment. Hematologic: no anemia, no easy bruising or bleeding  Endocrine: no thyroid problems, no heat or cold intolerance, excessive sweating, polyuria, polydipsia, +diabetes.         Objective:       Visit Vitals  /89   Pulse 68   Temp 98.3 °F (36.8 °C)   Resp 27   Ht 6' 1\" (1.854 m)   Wt 113.4 kg (250 lb)   SpO2 93%   BMI 32.98 kg/m²       No intake/output data recorded. No intake/output data recorded. Physical Exam:  General: well developed, well nourished, NAD, resting comfortably  HEENT: PERRLA, no abnormalities noted, sclera clear, EOMs intact  Neck: supple, no JVD, trachea midline, no carotid bruits  Heart: S1S2 with IRIR without murmurs, rubs or gallops  Lungs: clear to auscultation bilaterally, normal effort on O2, no wheezing or rales  Abd: soft, nontender, nondistended but rotund, +bowel sounds  Ext: warm, no edema, calves supple/nontender, pulses 2+ bilaterally  Skin: warm and dry, intact to view with healing surgical sites  Psychiatric: appropriate mood and affect, cooperative  Neurologic: A&O X 3, moves all 4 equally, CNs intact      ECG: AFib RVR , I/L T-wave inversion    ECHO: 9/24/21  · Echo study was technically difficulty. · LV: Estimated LVEF is 40 - 45%. Normal cavity size and diastolic function. Mild concentric hypertrophy. · LA: Moderately dilated left atrium. · MV: Mild mitral valve regurgitation is present. · TV: Mild tricuspid valve regurgitation is present. Right Ventricular Arterial Pressure (RVSP) is 44 mmHg. · IVC: Moderately elevated central venous pressure (8 mmHg); IVC diameter is larger than 21 mm and collapses more than 50% with respiration. CXR:   IMPRESSION  1. Normal/nonobstructive bowel gas pattern. 2. Bilateral interstitial densities, suspicious for pulmonary edema.     Data Review:      Recent Results (from the past 24 hour(s))   EKG, 12 LEAD, INITIAL    Collection Time: 12/03/21  9:16 PM   Result Value Ref Range    Ventricular Rate 125 BPM    Atrial Rate 323 BPM    QRS Duration 82 ms    Q-T Interval 320 ms    QTC Calculation (Bezet) 462 ms    Calculated R Axis 76 degrees    Calculated T Axis -108 degrees    Diagnosis       Atrial fibrillation  Probable LVH with secondary repol abnrm  Anterior ST elevation, probably due to LVH     TROPONIN-HIGH SENSITIVITY    Collection Time: 12/03/21  9:32 PM   Result Value Ref Range    Troponin-High Sensitivity 67.6 (H) 0 - 14 pg/mL   METABOLIC PANEL, COMPREHENSIVE    Collection Time: 12/03/21  9:32 PM   Result Value Ref Range    Sodium 140 138 - 145 mmol/L    Potassium 4.2 3.5 - 5.1 mmol/L    Chloride 108 (H) 98 - 107 mmol/L    CO2 24 21 - 32 mmol/L    Anion gap 8 7 - 16 mmol/L    Glucose 243 (H) 65 - 100 mg/dL    BUN 13 8 - 23 MG/DL    Creatinine 0.93 0.8 - 1.5 MG/DL    GFR est AA >60 >60 ml/min/1.73m2    GFR est non-AA >60 >60 ml/min/1.73m2    Calcium 9.3 8.3 - 10.4 MG/DL    Bilirubin, total 0.6 0.2 - 1.1 MG/DL    ALT (SGPT) 49 12 - 65 U/L    AST (SGOT) 64 (H) 15 - 37 U/L    Alk. phosphatase 99 50 - 136 U/L    Protein, total 7.7 6.3 - 8.2 g/dL    Albumin 3.6 3.2 - 4.6 g/dL    Globulin 4.1 (H) 2.3 - 3.5 g/dL    A-G Ratio 0.9 (L) 1.2 - 3.5     NT-PRO BNP    Collection Time: 12/03/21  9:32 PM   Result Value Ref Range    NT pro- (H) 5 - 125 PG/ML   CBC WITH AUTOMATED DIFF    Collection Time: 12/03/21  9:47 PM   Result Value Ref Range    WBC 4.6 4.3 - 11.1 K/uL    RBC 4.73 4.23 - 5.6 M/uL    HGB 12.5 (L) 13.6 - 17.2 g/dL    HCT 40.1 (L) 41.1 - 50.3 %    MCV 84.8 79.6 - 97.8 FL    MCH 26.4 26.1 - 32.9 PG    MCHC 31.2 (L) 31.4 - 35.0 g/dL    RDW 16.0 (H) 11.9 - 14.6 %    PLATELET 613 094 - 565 K/uL    MPV 9.2 (L) 9.4 - 12.3 FL    ABSOLUTE NRBC 0.00 0.0 - 0.2 K/uL    DF AUTOMATED      NEUTROPHILS 64 43 - 78 %    LYMPHOCYTES 20 13 - 44 %    MONOCYTES 12 4.0 - 12.0 %    EOSINOPHILS 3 0.5 - 7.8 %    BASOPHILS 1 0.0 - 2.0 %    IMMATURE GRANULOCYTES 1 0.0 - 5.0 %    ABS. NEUTROPHILS 3.0 1.7 - 8.2 K/UL    ABS. LYMPHOCYTES 0.9 0.5 - 4.6 K/UL    ABS. MONOCYTES 0.6 0.1 - 1.3 K/UL    ABS. EOSINOPHILS 0.1 0.0 - 0.8 K/UL    ABS. BASOPHILS 0.0 0.0 - 0.2 K/UL    ABS. IMM.  GRANS. 0.0 0.0 - 0.5 K/UL Assessment/Plan:   Principal Problem:    Acute respiratory failure -- likely 2/2 acute HTN, improved significantly in ED with IV Lasix and BP control, CT chest is pending, cont O2 and wean as tolerated, nebs PRN    Active Problems:    DM2 (diabetes mellitus, type 2) -- hold metformin tonight with need for IV contrast, use SSI for now      Coronary artery disease involving native coronary artery -- s/p CABG, cont ASA, BB, ACEi and statin      S/P CABG x 2 -- as above      Atrial fibrillation and flutter -- Pt in rapid AFib on arrival, appeared variable rate AFlutter at bedside, rate already improved with diuresis and BP control, unclear how long Pt has been out of rhythm or if arrhythmia precipitated by acute respiratory failure (likely the case), he has been off Xarelto for at least 1 week due to concern for SE of weakness, will resume, will start IV Dilt bolus/gtt and monitor response       Hypertensive emergency -- BP severely elevated on arrival, Pt reports compliance with meds, will give home meds now, IV NTG uptitrated at bedside for improved BP control, likely will get some BP improvement from the IV Dilt as well, monitor and wean off NTG as tolerated, re-eval home med doses and adjust as indicated      Acute diastolic CHF (congestive heart failure) -- likely due to severe HTN, will cont home BB and ACEi, on IV NTG and given IVP Lasix in ED, monitor response, maximize meds as clinically indicated          MIGUEL ÁNGEL Aleman  12/4/2021  12:23 AM

## 2021-12-04 NOTE — PROGRESS NOTES
Problem: Falls - Risk of  Goal: *Absence of Falls  Description: Document Dain Cardozo Fall Risk and appropriate interventions in the flowsheet. Outcome: Progressing Towards Goal  Note: Fall Risk Interventions:            Medication Interventions: Patient to call before getting OOB, Teach patient to arise slowly       Pt still in A Flutter, with HR in 60s.     Problem: Arrhythmia Pathway (Adult)  Goal: *Absence of arrhythmia  Outcome: Progressing Towards Goal

## 2021-12-04 NOTE — ED NOTES
TRANSFER - OUT REPORT:    Verbal report given to [facility] on Jevon Ferraro  being transferred to [facility](unit) for routine progression of care       Report consisted of patients Situation, Background, Assessment and   Recommendations(SBAR). Information from the following report(s) SBAR was reviewed with the receiving nurse. Lines:   Peripheral IV 12/03/21 Left Hand (Active)       Peripheral IV 12/03/21 Posterior; Right Hand (Active)        Opportunity for questions and clarification was provided.       Patient transported with:   Monitor

## 2021-12-04 NOTE — PROGRESS NOTES
Pt's BP remaining elevated. Currently 178/107. Dr. Bard Riddle made aware and received order to give evening dose of lisinopril early. Med administered. Will continue to monitor.

## 2021-12-04 NOTE — ED PROVIDER NOTES
66-year-old male presents to the emergency department with sudden onset of shortness of breath. Patient is 2-month status post CABG. States he has been feeling well until this evening when he had sudden onset shortness of breath. Denies chest or back pain. Denies history of DVT. Has not been taking his blood thinner as directed. Did develop A. fib after his CABG. Denies swelling in his legs. Does endorse some swelling in his abdomen. Has been compliant with all of his medications. States he does take his medication at night and has not taken them tonight but endorses compliance otherwise. Denies cough or upper respiratory congestion, denies fever or chills. Past Medical History:   Diagnosis Date    Arrhythmia     CAD (coronary artery disease)     [address] ([facility] [address]     GERD (gastroesophageal reflux disease)     Heart failure (HCC)     HTN (hypertension)     Hypercholesteremia     Prediabetes     no meds       Past Surgical History:   Procedure Laterality Date    HX KNEE ARTHROSCOPY      to L    HX PTCA  2000    stents x 2    HX SHOULDER ARTHROSCOPY      to L         No family history on file.     Social History     Socioeconomic History    Marital status:      Spouse name: Not on file    Number of children: Not on file    Years of education: Not on file    Highest education level: Not on file   Occupational History    Not on file   Tobacco Use    Smoking status: Never Smoker    Smokeless tobacco: Never Used   Substance and Sexual Activity    Alcohol use: No    Drug use: No    Sexual activity: Not on file   Other Topics Concern     Service Not Asked    Blood Transfusions Not Asked    Caffeine Concern Not Asked    Occupational Exposure Not Asked    Hobby Hazards Not Asked    Sleep Concern Not Asked    Stress Concern Not Asked    Weight Concern Not Asked    Special Diet Not Asked    Back Care Not Asked    Exercise Not Asked    Bike Helmet Not Asked    Seat Belt Not Asked    Self-Exams Not Asked   Social History Narrative    Not on file     Social Determinants of Health     Financial Resource Strain:     Difficulty of Paying Living Expenses: Not on file   Food Insecurity:     Worried About Running Out of Food in the Last Year: Not on file    Sven of Food in the Last Year: Not on file   Transportation Needs:     Lack of Transportation (Medical): Not on file    Lack of Transportation (Non-Medical): Not on file   Physical Activity:     Days of Exercise per Week: Not on file    Minutes of Exercise per Session: Not on file   Stress:     Feeling of Stress : Not on file   Social Connections:     Frequency of Communication with Friends and Family: Not on file    Frequency of Social Gatherings with Friends and Family: Not on file    Attends Buddhist Services: Not on file    Active Member of [address] [place] [address] [employer] or Organizations: Not on file    Attends Club or Organization Meetings: Not on file    Marital Status: Not on file   Intimate Partner Violence:     Fear of Current or Ex-Partner: Not on file    Emotionally Abused: Not on file    Physically Abused: Not on file    Sexually Abused: Not on file   Housing Stability:     Unable to Pay for Housing in the Last Year: Not on file    Number of [place] in the Last Year: Not on file    Unstable Housing in the Last Year: Not on file         ALLERGIES: Patient has no known allergies. Review of Systems   Constitutional: Negative for chills and fever. HENT: Negative for congestion and sore throat. Eyes: Negative for visual disturbance. Respiratory: Positive for shortness of breath. Negative for cough. Cardiovascular: Negative for chest pain. Gastrointestinal: Negative for abdominal pain, diarrhea, nausea and vomiting. Endocrine: Negative for polyuria. Genitourinary: Negative for difficulty urinating and dysuria. Musculoskeletal: Negative for neck pain and neck stiffness.    Skin: Negative for rash.   Neurological: Negative for weakness and headaches. All other systems reviewed and are negative. Vitals:    12/03/21 2112 12/03/21 2135   BP: (!) 179/126    Pulse: (!) 127    Resp: 30    Temp: 98.3 °F (36.8 °C)    SpO2: 96% 95%   Weight: 113.4 kg (250 lb)    Height: 6' 1\" (1.854 m)             Physical Exam  Vitals and nursing note reviewed. Constitutional:       General: He is in acute distress. Appearance: Normal appearance. He is ill-appearing. HENT:      Head: Normocephalic and atraumatic. Nose: Nose normal.      Mouth/Throat:      Mouth: Mucous membranes are moist.   Eyes:      Extraocular Movements: Extraocular movements intact. Cardiovascular:      Rate and Rhythm: Regular rhythm. Tachycardia present. Heart sounds: Normal heart sounds. Pulmonary:      Effort: Tachypnea and respiratory distress present. Breath sounds: Rales present. No wheezing or rhonchi. Comments: Increased work of breathing with tachypnea. Abdominal:      General: Abdomen is flat. Palpations: Abdomen is soft. Tenderness: There is no abdominal tenderness. There is no guarding. Musculoskeletal:         General: Normal range of motion. Cervical back: Normal range of motion. Skin:     General: Skin is warm and dry. Neurological:      General: No focal deficit present. Mental Status: He is alert and oriented to person, place, and time. Psychiatric:         Mood and Affect: Mood normal.          MDM  Number of Diagnoses or Management Options  Pulmonary edema cardiac cause (HCC)  SOB (shortness of breath)  Diagnosis management comments: 60-year-old male presents the ER tachycardic and hypertensive. Initial blood pressure in the 480A systolic. Patient with increased work of breathing, requiring supplemental oxygen.   Labs show normal white count, stable H&H, Normal electrolytes and kidney function, normal liver enzyme, BNP elevated at 792 troponins 67, chest x-ray showing pulmonary edema. Concern for flash from edema likely secondary to patient's elevated blood pressure. Patient given 20 mg IV Lasix. Heart rate improved without any intervention into the 90s. Patient placed on nitro drip for blood pressure and thus heart failure control. Cardiology consulted who agreed with admission. Requested CT scan to be performed to rule out PE. This was ordered at their request.  Patient voiced understanding and agreement with this plan. EKG shows atrial fibrillation, tach arrhythmia, rate 125, QRS 82, QTc 462, normal axis, T wave inversion laterally, LVH with nonspecific repull abnormality. Amount and/or Complexity of Data Reviewed  Clinical lab tests: ordered and reviewed  Tests in the radiology section of CPT®: reviewed and ordered  Discussion of test results with the performing providers: yes  Decide to obtain previous medical records or to obtain history from someone other than the patient: yes  Review and summarize past medical records: yes  Discuss the patient with other providers: yes  Independent visualization of images, tracings, or specimens: yes    Risk of Complications, Morbidity, and/or Mortality  Presenting problems: high  Diagnostic procedures: moderate  Management options: high           Critical Care  Performed by: Natalia Badillo DO  Authorized by:  Natalia Badillo DO     Critical care provider statement:     Critical care time (minutes):  42    Critical care time was exclusive of:  Separately billable procedures and treating other patients    Critical care was necessary to treat or prevent imminent or life-threatening deterioration of the following conditions:  Cardiac failure    Critical care was time spent personally by me on the following activities:  Blood draw for specimens, development of treatment plan with patient or surrogate, discussions with consultants, examination of patient, obtaining history from patient or surrogate, review of old charts, re-evaluation of patient's condition, ordering and review of radiographic studies, ordering and review of laboratory studies and ordering and performing treatments and interventions

## 2021-12-05 VITALS
RESPIRATION RATE: 18 BRPM | HEART RATE: 88 BPM | DIASTOLIC BLOOD PRESSURE: 69 MMHG | OXYGEN SATURATION: 97 % | TEMPERATURE: 97.5 F | WEIGHT: 246.1 LBS | BODY MASS INDEX: 32.62 KG/M2 | SYSTOLIC BLOOD PRESSURE: 145 MMHG | HEIGHT: 73 IN

## 2021-12-05 LAB
ANION GAP SERPL CALC-SCNC: 8 MMOL/L (ref 7–16)
BUN SERPL-MCNC: 11 MG/DL (ref 8–23)
CALCIUM SERPL-MCNC: 9.4 MG/DL (ref 8.3–10.4)
CHLORIDE SERPL-SCNC: 102 MMOL/L (ref 98–107)
CO2 SERPL-SCNC: 32 MMOL/L (ref 21–32)
CREAT SERPL-MCNC: 0.9 MG/DL (ref 0.8–1.5)
ERYTHROCYTE [DISTWIDTH] IN BLOOD BY AUTOMATED COUNT: 16.1 % (ref 11.9–14.6)
GLUCOSE BLD STRIP.AUTO-MCNC: 154 MG/DL (ref 65–100)
GLUCOSE SERPL-MCNC: 165 MG/DL (ref 65–100)
HCT VFR BLD AUTO: 39.3 % (ref 41.1–50.3)
HGB BLD-MCNC: 12.2 G/DL (ref 13.6–17.2)
MAGNESIUM SERPL-MCNC: 1.8 MG/DL (ref 1.8–2.4)
MCH RBC QN AUTO: 25.5 PG (ref 26.1–32.9)
MCHC RBC AUTO-ENTMCNC: 31 G/DL (ref 31.4–35)
MCV RBC AUTO: 82 FL (ref 79.6–97.8)
NRBC # BLD: 0 K/UL (ref 0–0.2)
PLATELET # BLD AUTO: 242 K/UL (ref 150–450)
PMV BLD AUTO: 9.6 FL (ref 9.4–12.3)
POTASSIUM SERPL-SCNC: 3.2 MMOL/L (ref 3.5–5.1)
RBC # BLD AUTO: 4.79 M/UL (ref 4.23–5.6)
SERVICE CMNT-IMP: ABNORMAL
SODIUM SERPL-SCNC: 142 MMOL/L (ref 138–145)
WBC # BLD AUTO: 5.3 K/UL (ref 4.3–11.1)

## 2021-12-05 PROCEDURE — 74011250637 HC RX REV CODE- 250/637: Performed by: NURSE PRACTITIONER

## 2021-12-05 PROCEDURE — 83735 ASSAY OF MAGNESIUM: CPT

## 2021-12-05 PROCEDURE — 74011250637 HC RX REV CODE- 250/637: Performed by: PHYSICIAN ASSISTANT

## 2021-12-05 PROCEDURE — 99238 HOSP IP/OBS DSCHRG MGMT 30/<: CPT | Performed by: INTERNAL MEDICINE

## 2021-12-05 PROCEDURE — 82962 GLUCOSE BLOOD TEST: CPT

## 2021-12-05 PROCEDURE — 74011000258 HC RX REV CODE- 258: Performed by: INTERNAL MEDICINE

## 2021-12-05 PROCEDURE — 85027 COMPLETE CBC AUTOMATED: CPT

## 2021-12-05 PROCEDURE — 74011250636 HC RX REV CODE- 250/636: Performed by: INTERNAL MEDICINE

## 2021-12-05 PROCEDURE — 80048 BASIC METABOLIC PNL TOTAL CA: CPT

## 2021-12-05 PROCEDURE — 36415 COLL VENOUS BLD VENIPUNCTURE: CPT

## 2021-12-05 PROCEDURE — 74011636637 HC RX REV CODE- 636/637: Performed by: NURSE PRACTITIONER

## 2021-12-05 RX ORDER — LISINOPRIL 40 MG/1
40 TABLET ORAL
Qty: 30 TABLET | Refills: 1 | Status: SHIPPED | OUTPATIENT
Start: 2021-12-05 | End: 2022-03-16 | Stop reason: ALTCHOICE

## 2021-12-05 RX ORDER — AMLODIPINE BESYLATE 5 MG/1
5 TABLET ORAL DAILY
Status: DISCONTINUED | OUTPATIENT
Start: 2021-12-06 | End: 2021-12-05

## 2021-12-05 RX ORDER — SPIRONOLACTONE 25 MG/1
25 TABLET ORAL DAILY
Qty: 30 TABLET | Refills: 1 | Status: SHIPPED | OUTPATIENT
Start: 2021-12-05 | End: 2022-02-16 | Stop reason: SDUPTHER

## 2021-12-05 RX ORDER — POTASSIUM CHLORIDE 20 MEQ/1
40 TABLET, EXTENDED RELEASE ORAL
Status: COMPLETED | OUTPATIENT
Start: 2021-12-05 | End: 2021-12-05

## 2021-12-05 RX ORDER — AMLODIPINE BESYLATE 5 MG/1
5 TABLET ORAL DAILY
Qty: 30 TABLET | Refills: 1 | Status: SHIPPED | OUTPATIENT
Start: 2021-12-05 | End: 2022-02-16 | Stop reason: SDUPTHER

## 2021-12-05 RX ORDER — AMLODIPINE BESYLATE 5 MG/1
5 TABLET ORAL DAILY
Status: DISCONTINUED | OUTPATIENT
Start: 2021-12-05 | End: 2021-12-05 | Stop reason: HOSPADM

## 2021-12-05 RX ADMIN — AMLODIPINE BESYLATE 5 MG: 5 TABLET ORAL at 11:25

## 2021-12-05 RX ADMIN — DOCUSATE SODIUM 100 MG: 100 CAPSULE, LIQUID FILLED ORAL at 08:19

## 2021-12-05 RX ADMIN — FUROSEMIDE 10 MG/HR: 10 INJECTION, SOLUTION INTRAMUSCULAR; INTRAVENOUS at 02:41

## 2021-12-05 RX ADMIN — POTASSIUM CHLORIDE 40 MEQ: 20 TABLET, EXTENDED RELEASE ORAL at 09:59

## 2021-12-05 RX ADMIN — ACETAMINOPHEN 650 MG: 325 TABLET ORAL at 00:51

## 2021-12-05 RX ADMIN — METOPROLOL SUCCINATE 50 MG: 50 TABLET, EXTENDED RELEASE ORAL at 08:19

## 2021-12-05 RX ADMIN — INSULIN LISPRO 2 UNITS: 100 INJECTION, SOLUTION INTRAVENOUS; SUBCUTANEOUS at 08:20

## 2021-12-05 RX ADMIN — Medication 5 ML: at 05:52

## 2021-12-05 NOTE — PROGRESS NOTES
am  12/5/2021 7:29 AM    Admit Date: 12/3/2021    Admit Diagnosis: Acute respiratory failure (HCC) [J96.00]      Subjective:    Patient : 71 y/o AA male with PMHx of CAD, HTN, HLD, DM II, and PAF who presented to the ED via EMS with c/o acutely worsening SOB. Pt states that when he woke Fri AM he felt a little more SOB than usual. He also reports feeling bloated in his abdomen. He thought this was causing him to be SOB. His wife went to the store and got him some laxatives. He states he was able to have a BM but still felt bloated. Throughout the day h reports feeling OK. However, this evening while walking from the kitchen to the bedroom he became incredibly SOB. EMS was called. On arrival to the ED: Pt with good O2 sats on 6LNC. He was found to have CXR c/w VO and was given 20mg IVP Lasix. He was severely hypertensive with initial /126 with max /130 (+reading 210/156 ? accuracy). He was started on IV NTG gtt at 20mcg/min. He had hsTrop 67.6 and proBNP 792. Cardiology was called for further management. Pt states that prior to today he has been feeling well. Denies recent cough, cold, congestion, F/C/S. Reports that he has been taking BP as directed. However, states that he does not check his BP at home. Reports his Lisinopril was increased from 10mg -> 40mg by his PCP at the last appt in mid-Nov. He reports that he was started on Jardiance. He felt weak and read that weakness could be a SE of this med as well as the Xarelto he was taking. States he stopped taking both meds last Sat. States that since his CABG he has been recovering well. Has been riding his stationary bike at home w/o any CP or SOB and feels his stamina is returning. He denies noting any palpitations. 12/4/2021  Pt states that he feels good this morning. He has no complaints currently denying any CP, SOB, palpitations, dizziness, or weakness. 12/5/2021  Per nurse, BP was elevated at 178/107 last night.  Pt given evening dose of lisinopril and BP improved to 148/79. On evaluation this morning, BP is stable at 153/79. Pt has no complaints currently denying any CP, SOB, palpitations, dizziness, or weakness.     Objective:      Visit Vitals  BP (!) 153/79   Pulse 80   Temp 97.7 °F (36.5 °C)   Resp 16   Ht 6' 1\" (1.854 m)   Wt 246 lb 1.6 oz (111.6 kg)   SpO2 96%   BMI 32.47 kg/m²       ROS:  General ROS: negative for - chills  Hematological and Lymphatic ROS: negative for - blood clots or jaundice  Respiratory ROS: no cough, shortness of breath, or wheezing  Cardiovascular ROS: no chest pain or dyspnea on exertion  Gastrointestinal ROS: no abdominal pain, change in bowel habits, or black or bloody stools  Neurological ROS: no TIA or stroke symptoms    Physical Exam:  General appearance - alert, well appearing, in NAD  Neck/lymph - supple, no significant adenopathy  Chest/CV - clear to auscultation, no wheezes, rales or rhonchi, symmetric air entry  Heart - normal rate, regular rhythm, normal S1, S2, no murmurs, rubs, clicks or gallops  Abdomen/GI - soft, nontender, nondistended, no masses or organomegaly   Musculoskeletal - no joint tenderness, deformity or swelling  Extremities - no pedal edema noted, intact peripheral pulses  Skin - normal coloration and turgor, no rashes, no suspicious skin lesions noted    Current Facility-Administered Medications   Medication Dose Route Frequency    sodium chloride (NS) flush 5-40 mL  5-40 mL IntraVENous Q8H    sodium chloride (NS) flush 5-40 mL  5-40 mL IntraVENous PRN    acetaminophen (TYLENOL) tablet 650 mg  650 mg Oral Q4H PRN    morphine injection 2 mg  2 mg IntraVENous Q4H PRN    naloxone (NARCAN) injection 0.4 mg  0.4 mg IntraVENous PRN    docusate sodium (COLACE) capsule 100 mg  100 mg Oral BID    alum-mag hydroxide-simeth (MYLANTA) oral suspension 30 mL  30 mL Oral Q4H PRN    insulin lispro (HUMALOG) injection   SubCUTAneous AC&HS    aspirin delayed-release tablet 81 mg  81 mg Oral QHS    lisinopriL (PRINIVIL, ZESTRIL) tablet 40 mg  40 mg Oral QHS    nitroglycerin (NITROSTAT) tablet 0.4 mg  0.4 mg SubLINGual Q5MIN PRN    rivaroxaban (XARELTO) tablet 20 mg  20 mg Oral DAILY WITH DINNER    rosuvastatin (CRESTOR) tablet 20 mg  20 mg Oral QHS    metoprolol succinate (TOPROL-XL) XL tablet 50 mg  50 mg Oral DAILY    levalbuterol (XOPENEX) nebulizer soln 1.25 mg/3 mL  1.25 mg Nebulization Q4H PRN    furosemide (LASIX) 100 mg in 0.9% sodium chloride (MBP/ADV) 110 mL infusion  10 mg/hr IntraVENous CONTINUOUS     Data Review: data included in this note has been independently reviewed by the author   All lab results for the last 24 hours reviewed. Telemetry: nsr    Assessment/Plan:     Principal Problem:    Acute respiratory failure ([facility] (12/3/2021)  - Likely 2/2 acute HTN, improved significantly in ED with IV Lasix and BP control  - CT chest shows CHF type findings (moderate bilateral pleural effusions, mild pulmonary edema, and cardiomegaly)  - Wean O2 as tolerated    Active Problems:    DM2 (diabetes mellitus, type 2) ([facility] (9/24/2021)        Coronary artery disease involving native coronary artery (9/24/2021)  - S/p CABG  - Cont ASA, beta blocker, ACEi, and statin      S/P CABG x 2 (9/24/2021)      Hypertensive emergency (12/4/2021)  - BP controlled with Lisinopril 40mg      Atrial fibrillation and flutter ([facility] (12/4/2021)  - Rate controlled. Pt currently in normal sinus rhythm. Arrhythmia likely precipitated by acute respiratory failure.   - Continue home Xarelto       Acute diastolic CHF (congestive heart failure) ([facility] (12/4/2021)  - Likely due to severe HTN    Add Farxiga 10 mg p.o. daily at discharge and Aldactone 25 mg p.o. daily

## 2021-12-05 NOTE — PROGRESS NOTES
Reviewed notes for concerns      Per notes:      RICH [address] 85        Will continue to assess how to best serve this family

## 2021-12-05 NOTE — DISCHARGE SUMMARY
[facility]iology Discharge Summary     Patient ID:  Tigre De La Garza  [id number]  22 y.o.  1950    Admit date: 12/3/2021    Discharge date and time:  12/5/2021    Admitting Physician: Marylee Bell, MD     Discharge Physician: Dr. Jordi Tucker    Admission Diagnoses: Acute respiratory failure ([facility] [J96.00]    Discharge Diagnoses:    Diagnosis    Hypertensive emergency    Atrial fibrillation and flutter ([facility]    Acute diastolic CHF (congestive heart failure) ([facility]    Acute respiratory failure ([facility]    HTN (hypertension)    DM2 (diabetes mellitus, type 2) ([facility]    Coronary artery disease involving native coronary artery    CAD (coronary artery disease)    S/P CABG x 2    Acute pulmonary edema [facility])     Cardiology Procedures this admission:  None  Consults: None    Hospital Course: Patient is a 71 y/o AA male with PMHx of CAD, HTN, HLD, DM II, and PAF who presented to the ED via EMS with c/o acutely worsening SOB. On arrival to the ED: Pt with good O2 sats on 6LNC. He was found to have CXR c/w VO and was given 20mg IVP Lasix. He was severely hypertensive with initial /126 with max /130 (+reading 210/156 ? accuracy). He was started on IV NTG gtt at 20mcg/min. He had hsTrop 67.6 and proBNP 792. EKG showed atrial fibrillation with RVR. Patient was given cardizem bolus and started on cardizem gtt in ED. Cardiology was called for further management. Patient was admitted with acute respiratory failure likely secondary to hypertensive emergency. Pt was started on IV diuresis with Lasix drip. CT chest was performed and showed:   1. Negative for pulmonary embolism. 2. CHF type findings (moderate bilateral pleural effusions, mild pulmonary edema and cardiomegaly). 3. Status post sternotomy. No retrosternal or anterior chest wall fluid collection. Patient's heart rate and BP improved. Pt had good response to IV diuresis and was able to be changed to PO Lasix on 12-5.  Pt had good urine out put and was net negative 6L. On the morning of discharge, Pt was up feeling well without any complaints of CP, SOB, or LE swelling. Pt's labs were stable with exception of low potasium. This was replaced prior to discharge. Pt was seen and examined by Dr. Andra Guallpa and determined stable and ready for discharge. Pt was instructed on the importance of weighing self daily. If Pt gains more than 2 lbs overnight or 5 lbs in one week, Pt is instructed to call [facility] at [phone]. For maximized medical therapy of congestive heart failure, patient will continue use of BB, ACE-I, spironolactone, and farxiga. The patient has been scheduled for 7 day transitional care follow up with [facility] --  Pt was given lab slip for a BMP/Mag in one week. DISPOSITION: The patient is being discharged home in stable condition on a low saturated fat, low cholesterol and low salt diet. Pt is instructed to follow a fluid restricted diet with no more than 2 liters per day. Pt is instructed to advance activities as tolerated limited to fatigue or shortness of breath. Pt is instructed to call office or return to ER for immediate evaluation of any shortness of breath or chest pain. Discharge Exam:   Visit Vitals  BP (!) 149/74 (BP 1 Location: Left upper arm, BP Patient Position: At rest)   Pulse 88   Temp 97.5 °F (36.4 °C)   Resp 18   Ht 6' 1\" (1.854 m)   Wt 111.6 kg (246 lb 1.6 oz)   SpO2 97%   BMI 32.47 kg/m²   Pt has been seen by Dr Andra Guallpa: see his progress note for exam details.     Recent Results (from the past 24 hour(s))   GLUCOSE, POC    Collection Time: 12/04/21 11:27 AM   Result Value Ref Range    Glucose (POC) 223 (H) 65 - 100 mg/dL    Performed by [facility]    GLUCOSE, POC    Collection Time: 12/04/21  3:56 PM   Result Value Ref Range    Glucose (POC) 223 (H) 65 - 100 mg/dL    Performed by [facility]    GLUCOSE, POC    Collection Time: 12/04/21  8:29 PM   Result Value Ref Range Glucose (POC) 159 (H) 65 - 100 mg/dL    Performed by [facility]    METABOLIC PANEL, BASIC    Collection Time: 12/05/21  5:24 AM   Result Value Ref Range    Sodium 142 138 - 145 mmol/L    Potassium 3.2 (L) 3.5 - 5.1 mmol/L    Chloride 102 98 - 107 mmol/L    CO2 32 21 - 32 mmol/L    Anion gap 8 7 - 16 mmol/L    Glucose 165 (H) 65 - 100 mg/dL    BUN 11 8 - 23 MG/DL    Creatinine 0.90 0.8 - 1.5 MG/DL    GFR est AA >60 >60 ml/min/1.73m2    GFR est non-AA >60 >60 ml/min/1.73m2    Calcium 9.4 8.3 - 10.4 MG/DL   MAGNESIUM    Collection Time: 12/05/21  5:24 AM   Result Value Ref Range    Magnesium 1.8 1.8 - 2.4 mg/dL   CBC W/O DIFF    Collection Time: 12/05/21  5:24 AM   Result Value Ref Range    WBC 5.3 4.3 - 11.1 K/uL    RBC 4.79 4.23 - 5.6 M/uL    HGB 12.2 (L) 13.6 - 17.2 g/dL    HCT 39.3 (L) 41.1 - 50.3 %    MCV 82.0 79.6 - 97.8 FL    MCH 25.5 (L) 26.1 - 32.9 PG    MCHC 31.0 (L) 31.4 - 35.0 g/dL    RDW 16.1 (H) 11.9 - 14.6 %    PLATELET 076 413 - 481 K/uL    MPV 9.6 9.4 - 12.3 FL    ABSOLUTE NRBC 0.00 0.0 - 0.2 K/uL   GLUCOSE, POC    Collection Time: 12/05/21  7:49 AM   Result Value Ref Range    Glucose (POC) 154 (H) 65 - 100 mg/dL    Performed by [facility]      Patient Instructions:   Current Discharge Medication List      START taking these medications    Details   spironolactone (ALDACTONE) 25 mg tablet Take 1 Tablet by mouth daily. Qty: 30 Tablet, Refills: 1  Start date: 12/5/2021      dapagliflozin (Farxiga) 10 mg tab tablet Take 1 Tablet by mouth daily. Qty: 30 Tablet, Refills: 1  Start date: 12/5/2021      amLODIPine (NORVASC) 5 mg tablet Take 1 Tablet by mouth daily. Qty: 30 Tablet, Refills: 1  Start date: 12/5/2021         CONTINUE these medications which have CHANGED    Details   lisinopriL (PRINIVIL, ZESTRIL) 40 mg tablet Take 1 Tablet by mouth nightly.   Qty: 30 Tablet, Refills: 1  Start date: 12/5/2021         CONTINUE these medications which have NOT CHANGED    Details   metoprolol succinate (Toprol XL) 50 mg XL tablet Take 50 mg by mouth nightly. metFORMIN (GLUCOPHAGE) 850 mg tablet Take 850 mg by mouth two (2) times daily (with meals). rivaroxaban (Xarelto) 20 mg tab tablet Take 1 Tablet by mouth daily (with dinner). Qty: 30 Tablet, Refills: 5      acetaminophen (TYLENOL) 325 mg tablet Take 2 Tablets by mouth every six (6) hours as needed for Pain. nitroglycerin (NITROSTAT) 0.4 mg SL tablet 1 Tablet by SubLINGual route every five (5) minutes as needed for Chest Pain. Up to 3 doses. Qty: 25 Tablet, Refills: 3      aspirin delayed-release 81 mg tablet Take 81 mg by mouth nightly. rosuvastatin (CRESTOR) 20 mg tablet Take 20 mg by mouth nightly.                  TERESA Amaro  12/5/2021  9:29 AM

## 2021-12-05 NOTE — PROGRESS NOTES
Pt is for discharge home today with family and no needs/supportive care orders recieved for CM at this time. Pt is known to CM from previous care. He had CABG surgery earlier this year and was followed by [facility] oper[facility]. He reports that he is returning home with is spouse today has no need for [facility] again at this time. Pt is to follow up with [address]. Care Management Interventions  PCP Verified by CM: Yes (Dr Roger Doshi)  Mode of Transport at Discharge:  Other (see comment) (family)  Transition of Care Consult (CM Consult): Discharge Planning (Pt is insured by Medicare with [employer]ineering and [employer] benefits.  )  Discharge Durable Medical Equipment: No  Physical Therapy Consult: No  Occupational Therapy Consult: No  Speech Therapy Consult: No  Support Systems: Spouse/Significant Other  Confirm Follow Up Transport: Self  Name of the Patient Representative Who was Provided with a Choice of Provider and Agrees with the Discharge Plan: pt  [address] Provided?: No  Discharge Location  Discharge Placement: Home

## 2021-12-05 NOTE — PROGRESS NOTES
Discharge instructions reviewed with patient. Prescriptions given for lab work and med info sheets provided for all new medications. Opportunity for questions provided. Patient and spouse voiced understanding of all discharge instructions. Unable to obtain electronic discharge signature due to pen pad error.

## 2021-12-05 NOTE — DISCHARGE INSTRUCTIONS
Patient Education   The patient is being discharged home in stable condition on a low saturated fat, low cholesterol and low salt diet. Pt is instructed to follow a fluid restricted diet with no more than 2 liters per day. Pt is instructed to advance activities as tolerated limited to fatigue or shortness of breath. Pt is instructed to call office or return to ER for immediate evaluation of any shortness of breath or chest pain.        Heart Failure: Care Instructions  Your Care Instructions     Heart failure occurs when your heart does not pump as much blood as the body needs. Failure does not mean that the heart has stopped pumping but rather that it is not pumping as well as it should. Over time, this causes fluid buildup in your lungs and other parts of your body. Fluid buildup can cause shortness of breath, fatigue, swollen ankles, and other problems. By taking medicines regularly, reducing sodium (salt) in your diet, checking your weight every day, and making lifestyle changes, you can feel better and live longer. Follow-up care is a key part of your treatment and safety. Be sure to make and go to all appointments, and call your doctor if you are having problems. It's also a good idea to know your test results and keep a list of the medicines you take. How can you care for yourself at home? Medicines    · Be safe with medicines. Take your medicines exactly as prescribed. Call your doctor if you think you are having a problem with your medicine.     · Do not take any vitamins, over-the-counter medicine, or herbal products without talking to your doctor first. Reyna Him not take ibuprofen (Advil or Motrin) and naproxen (Aleve) without talking to your doctor first. They could make your heart failure worse.     · You may take some of the following medicine. ?  Angiotensin-converting enzyme inhibitors (ACEIs) or angiotensin II receptor blockers (ARBs) reduce the heart's workload, lower blood pressure, and reduce swelling. Taking an ACEI or ARB may lower your chance of needing to be hospitalized. ? Beta-blockers can slow heart rate, decrease blood pressure, and improve your condition. Taking a beta-blocker may lower your chance of needing to be hospitalized. ? Diuretics, also called water pills, reduce swelling. You will get more details on the specific medicines your doctor prescribes. Diet    · Your doctor may suggest that you limit sodium. Your doctor can tell you how much sodium is right for you. An example is less than 3,000 mg a day. This includes all the salt you eat in cooking or in packaged foods. People get most of their sodium from processed foods. Fast food and restaurant meals also tend to be very high in sodium.     · Ask your doctor how much liquid you can drink each day. You may have to limit liquids. Weight    · Weigh yourself without clothing at the same time each day. Record your weight. Call your doctor if you have a sudden weight gain, such as more than 2 to 3 pounds in a day or 5 pounds in a week. (Your doctor may suggest a different range of weight gain.) A sudden weight gain may mean that your heart failure is getting worse. Activity level    · Start light exercise (if your doctor says it is okay). Even if you can only do a small amount, exercise will help you get stronger, have more energy, and manage your weight and your stress. Walking is an easy way to get exercise. Start out by walking a little more than you did before. Bit by bit, increase the amount you walk.     · When you exercise, watch for signs that your heart is working too hard. You are pushing yourself too hard if you cannot talk while you are exercising. If you become short of breath or dizzy or have chest pain, stop, sit down, and rest.     · If you feel \"wiped out\" the day after you exercise, walk slower or for a shorter distance until you can work up to a better pace.     · Get enough rest at night.  Sleeping with 1 or 2 pillows under your upper body and head may help you breathe easier. Lifestyle changes    · Do not smoke. Smoking can make a heart condition worse. If you need help quitting, talk to your doctor about stop-smoking programs and medicines. These can increase your chances of quitting for good. Quitting smoking may be the most important step you can take to protect your heart.     · Limit alcohol to 2 drinks a day for men and 1 drink a day for women. Too much alcohol can cause health problems.     · Avoid getting sick from colds and the flu. Get a pneumococcal vaccine shot. If you have had one before, ask your doctor whether you need another dose. Get a flu shot each year. If you must be around people with colds or the flu, wash your hands often. When should you call for help? Call 911  if you have symptoms of sudden heart failure such as:    · You have severe trouble breathing.     · You cough up pink, foamy mucus.     · You have a new irregular or rapid heartbeat. Call your doctor now or seek immediate medical care if:    · You have new or increased shortness of breath.     · You are dizzy or lightheaded, or you feel like you may faint.     · You have sudden weight gain, such as more than 2 to 3 pounds in a day or 5 pounds in a week. (Your doctor may suggest a different range of weight gain.)     · You have increased swelling in your legs, ankles, or feet.     · You are suddenly so tired or weak that you cannot do your usual activities. Watch closely for changes in your health, and be sure to contact your doctor if you develop new symptoms. Where can you learn more? Go to http://www.gray.com/  Enter E984 in the search box to learn more about \"Heart Failure: Care Instructions. \"  Current as of: April 29, 2021               Content Version: 13.0  © 3570-1464 [employer], [employer].    Care instructions adapted under license by [employer] (which disclaims liability or warranty for this information). If you have questions about a medical condition or this instruction, always ask your healthcare professional. [place] [address] any warranty or liability for your use of this information.

## 2021-12-06 ENCOUNTER — APPOINTMENT (OUTPATIENT)
Dept: CARDIAC REHAB | Age: 71
End: 2021-12-06

## 2021-12-08 ENCOUNTER — APPOINTMENT (OUTPATIENT)
Dept: CARDIAC REHAB | Age: 71
End: 2021-12-08

## 2021-12-09 ENCOUNTER — APPOINTMENT (OUTPATIENT)
Dept: CARDIAC REHAB | Age: 71
End: 2021-12-09

## 2021-12-13 ENCOUNTER — APPOINTMENT (OUTPATIENT)
Dept: CARDIAC REHAB | Age: 71
End: 2021-12-13

## 2021-12-15 ENCOUNTER — APPOINTMENT (OUTPATIENT)
Dept: CARDIAC REHAB | Age: 71
End: 2021-12-15

## 2021-12-16 ENCOUNTER — APPOINTMENT (OUTPATIENT)
Dept: CARDIAC REHAB | Age: 71
End: 2021-12-16

## 2021-12-20 ENCOUNTER — APPOINTMENT (OUTPATIENT)
Dept: CARDIAC REHAB | Age: 71
End: 2021-12-20

## 2021-12-22 ENCOUNTER — APPOINTMENT (OUTPATIENT)
Dept: CARDIAC REHAB | Age: 71
End: 2021-12-22

## 2021-12-23 ENCOUNTER — APPOINTMENT (OUTPATIENT)
Dept: CARDIAC REHAB | Age: 71
End: 2021-12-23

## 2021-12-27 ENCOUNTER — APPOINTMENT (OUTPATIENT)
Dept: CARDIAC REHAB | Age: 71
End: 2021-12-27

## 2021-12-29 ENCOUNTER — APPOINTMENT (OUTPATIENT)
Dept: CARDIAC REHAB | Age: 71
End: 2021-12-29

## 2021-12-30 ENCOUNTER — APPOINTMENT (OUTPATIENT)
Dept: CARDIAC REHAB | Age: 71
End: 2021-12-30

## 2022-01-03 ENCOUNTER — APPOINTMENT (OUTPATIENT)
Dept: CARDIAC REHAB | Age: 72
End: 2022-01-03

## 2022-01-05 ENCOUNTER — APPOINTMENT (OUTPATIENT)
Dept: CARDIAC REHAB | Age: 72
End: 2022-01-05

## 2022-01-06 ENCOUNTER — APPOINTMENT (OUTPATIENT)
Dept: CARDIAC REHAB | Age: 72
End: 2022-01-06

## 2022-01-10 ENCOUNTER — APPOINTMENT (OUTPATIENT)
Dept: CARDIAC REHAB | Age: 72
End: 2022-01-10

## 2022-01-12 ENCOUNTER — APPOINTMENT (OUTPATIENT)
Dept: CARDIAC REHAB | Age: 72
End: 2022-01-12

## 2022-01-13 ENCOUNTER — APPOINTMENT (OUTPATIENT)
Dept: CARDIAC REHAB | Age: 72
End: 2022-01-13

## 2022-01-17 ENCOUNTER — APPOINTMENT (OUTPATIENT)
Dept: CARDIAC REHAB | Age: 72
End: 2022-01-17

## 2022-01-19 ENCOUNTER — APPOINTMENT (OUTPATIENT)
Dept: CARDIAC REHAB | Age: 72
End: 2022-01-19

## 2022-01-20 ENCOUNTER — APPOINTMENT (OUTPATIENT)
Dept: CARDIAC REHAB | Age: 72
End: 2022-01-20

## 2022-01-24 ENCOUNTER — APPOINTMENT (OUTPATIENT)
Dept: CARDIAC REHAB | Age: 72
End: 2022-01-24

## 2022-01-26 ENCOUNTER — APPOINTMENT (OUTPATIENT)
Dept: CARDIAC REHAB | Age: 72
End: 2022-01-26

## 2022-03-10 PROBLEM — I50.41 ACUTE COMBINED SYSTOLIC AND DIASTOLIC ACC/AHA STAGE C CONGESTIVE HEART FAILURE (HCC): Status: ACTIVE | Noted: 2021-12-04

## 2022-03-15 PROBLEM — I50.22 CHRONIC HFREF (HEART FAILURE WITH REDUCED EJECTION FRACTION) (HCC): Status: ACTIVE | Noted: 2022-03-15

## 2022-03-18 PROBLEM — I48.92 ATRIAL FIBRILLATION AND FLUTTER (HCC): Status: ACTIVE | Noted: 2021-12-04

## 2022-03-18 PROBLEM — I21.4 NSTEMI (NON-ST ELEVATED MYOCARDIAL INFARCTION) (HCC): Status: ACTIVE | Noted: 2021-09-24

## 2022-03-18 PROBLEM — Z99.11 ENCOUNTER FOR WEANING FROM VENTILATOR (HCC): Status: ACTIVE | Noted: 2021-09-24

## 2022-03-18 PROBLEM — I48.91 ATRIAL FIBRILLATION AND FLUTTER (HCC): Status: ACTIVE | Noted: 2021-12-04

## 2022-03-18 PROBLEM — E11.9 DM2 (DIABETES MELLITUS, TYPE 2) (HCC): Status: ACTIVE | Noted: 2021-09-24

## 2022-03-18 PROBLEM — I10 HTN (HYPERTENSION): Status: ACTIVE | Noted: 2021-09-24

## 2022-03-18 PROBLEM — R09.02 HYPOXEMIA: Status: ACTIVE | Noted: 2021-09-24

## 2022-03-19 PROBLEM — J96.00 ACUTE RESPIRATORY FAILURE (HCC): Status: ACTIVE | Noted: 2021-12-03

## 2022-03-19 PROBLEM — Z95.1 S/P CABG X 2: Status: ACTIVE | Noted: 2021-09-24

## 2022-03-19 PROBLEM — J81.0 ACUTE PULMONARY EDEMA (HCC): Status: ACTIVE | Noted: 2021-09-24

## 2022-03-19 PROBLEM — I97.89 POSTOPERATIVE ATRIAL FIBRILLATION (HCC): Status: ACTIVE | Noted: 2021-09-28

## 2022-03-19 PROBLEM — I50.41 ACUTE COMBINED SYSTOLIC AND DIASTOLIC ACC/AHA STAGE C CONGESTIVE HEART FAILURE (HCC): Status: ACTIVE | Noted: 2021-12-04

## 2022-03-19 PROBLEM — I25.10 CORONARY ARTERY DISEASE INVOLVING NATIVE CORONARY ARTERY: Status: ACTIVE | Noted: 2021-09-24

## 2022-03-19 PROBLEM — I25.10 CAD (CORONARY ARTERY DISEASE): Status: ACTIVE | Noted: 2021-09-24

## 2022-03-19 PROBLEM — I16.1 HYPERTENSIVE EMERGENCY: Status: ACTIVE | Noted: 2021-12-04

## 2022-03-19 PROBLEM — I48.91 POSTOPERATIVE ATRIAL FIBRILLATION (HCC): Status: ACTIVE | Noted: 2021-09-28

## 2022-03-24 PROBLEM — I50.22 CHRONIC HFREF (HEART FAILURE WITH REDUCED EJECTION FRACTION) (HCC): Status: ACTIVE | Noted: 2022-03-15

## 2022-03-29 ENCOUNTER — HOSPITAL ENCOUNTER (OUTPATIENT)
Dept: LAB | Age: 72
Discharge: HOME | End: 2022-03-29
Payer: MEDICARE

## 2022-03-29 DIAGNOSIS — I50.22 CHRONIC HFREF (HEART FAILURE WITH REDUCED EJECTION FRACTION) (HCC): ICD-10-CM

## 2022-03-29 LAB
ANION GAP SERPL CALC-SCNC: 6 MMOL/L (ref 7–16)
BUN SERPL-MCNC: 18 MG/DL (ref 8–23)
CALCIUM SERPL-MCNC: 9.3 MG/DL (ref 8.3–10.4)
CHLORIDE SERPL-SCNC: 103 MMOL/L (ref 98–107)
CO2 SERPL-SCNC: 27 MMOL/L (ref 21–32)
CREAT SERPL-MCNC: 1.1 MG/DL (ref 0.8–1.5)
GLUCOSE SERPL-MCNC: 135 MG/DL (ref 65–100)
POTASSIUM SERPL-SCNC: 3.9 MMOL/L (ref 3.5–5.1)
SODIUM SERPL-SCNC: 136 MMOL/L (ref 138–145)

## 2022-03-29 PROCEDURE — 36415 COLL VENOUS BLD VENIPUNCTURE: CPT

## 2022-03-29 PROCEDURE — 80048 BASIC METABOLIC PNL TOTAL CA: CPT

## 2022-07-25 ENCOUNTER — OFFICE VISIT (OUTPATIENT)
Dept: CARDIOLOGY CLINIC | Age: 72
End: 2022-07-25
Payer: MEDICARE

## 2022-07-25 VITALS
SYSTOLIC BLOOD PRESSURE: 110 MMHG | DIASTOLIC BLOOD PRESSURE: 60 MMHG | BODY MASS INDEX: 35.25 KG/M2 | WEIGHT: 266 LBS | HEART RATE: 72 BPM | HEIGHT: 73 IN

## 2022-07-25 DIAGNOSIS — I25.10 CORONARY ARTERY DISEASE INVOLVING NATIVE CORONARY ARTERY OF NATIVE HEART WITHOUT ANGINA PECTORIS: ICD-10-CM

## 2022-07-25 DIAGNOSIS — I48.92 ATRIAL FIBRILLATION AND FLUTTER (HCC): ICD-10-CM

## 2022-07-25 DIAGNOSIS — I50.22 CHRONIC HFREF (HEART FAILURE WITH REDUCED EJECTION FRACTION) (HCC): Primary | ICD-10-CM

## 2022-07-25 DIAGNOSIS — I48.91 ATRIAL FIBRILLATION AND FLUTTER (HCC): ICD-10-CM

## 2022-07-25 PROCEDURE — 1123F ACP DISCUSS/DSCN MKR DOCD: CPT | Performed by: INTERNAL MEDICINE

## 2022-07-25 PROCEDURE — G8427 DOCREV CUR MEDS BY ELIG CLIN: HCPCS | Performed by: INTERNAL MEDICINE

## 2022-07-25 PROCEDURE — 1036F TOBACCO NON-USER: CPT | Performed by: INTERNAL MEDICINE

## 2022-07-25 PROCEDURE — 3017F COLORECTAL CA SCREEN DOC REV: CPT | Performed by: INTERNAL MEDICINE

## 2022-07-25 PROCEDURE — 99213 OFFICE O/P EST LOW 20 MIN: CPT | Performed by: INTERNAL MEDICINE

## 2022-07-25 PROCEDURE — G8419 CALC BMI OUT NRM PARAM NOF/U: HCPCS | Performed by: INTERNAL MEDICINE

## 2022-07-25 ASSESSMENT — ENCOUNTER SYMPTOMS: SHORTNESS OF BREATH: 0

## 2022-07-25 NOTE — PATIENT INSTRUCTIONS
Patient Education        Heart Failure: Care Instructions  Your Care Instructions     Heart failure occurs when your heart does not pump as much blood as the body needs. Failure does not mean that the heart has stopped pumping but rather that it is not pumping as well as it should. Over time, this causes fluid buildup in your lungs and other parts of your body. Fluid buildup can cause shortness of breath, fatigue, swollen ankles, and other problems. By taking medicines regularly, reducing sodium (salt) in your diet, checking your weight every day,and making lifestyle changes, you can feel better and live longer. Follow-up care is a key part of your treatment and safety. Be sure to make and go to all appointments, and call your doctor if you are having problems. It's also a good idea to know your test results and keep alist of the medicines you take. How can you care for yourself at home? Medicines    Be safe with medicines. Take your medicines exactly as prescribed. Call your doctor if you think you are having a problem with your medicine. Do not take any vitamins, over-the-counter medicine, or herbal products without talking to your doctor first. Tessa Stephany not take ibuprofen (Advil or Motrin) and naproxen (Aleve) without talking to your doctor first. They could make your heart failure worse. You may take some of the following medicine. Angiotensin-converting enzyme inhibitors (ACEIs) or angiotensin II receptor blockers (ARBs) reduce the heart's workload, lower blood pressure, and reduce swelling. Taking an ACEI or ARB may lower your chance of needing to be hospitalized. Beta-blockers can slow heart rate, decrease blood pressure, and improve your condition. Taking a beta-blocker may lower your chance of needing to be hospitalized. Diuretics, also called water pills, reduce swelling. You will get more details on the specific medicines your doctor prescribes.   Diet    Your doctor may suggest that you limit sodium. Your doctor can tell you how much sodium is right for you. An example is less than 3,000 mg a day. This includes all the salt you eat in cooking or in packaged foods. People get most of their sodium from processed foods. Fast food and restaurant meals also tend to be very high in sodium. Ask your doctor how much liquid you can drink each day. You may have to limit liquids. Weight    Weigh yourself without clothing at the same time each day. Record your weight. Call your doctor if you have a sudden weight gain, such as more than 2 to 3 pounds in a day or 5 pounds in a week. (Your doctor may suggest a different range of weight gain.) A sudden weight gain may mean that your heart failure is getting worse. Activity level    Start light exercise (if your doctor says it is okay). Even if you can only do a small amount, exercise will help you get stronger, have more energy, and manage your weight and your stress. Walking is an easy way to get exercise. Start out by walking a little more than you did before. Bit by bit, increase the amount you walk. When you exercise, watch for signs that your heart is working too hard. You are pushing yourself too hard if you cannot talk while you are exercising. If you become short of breath or dizzy or have chest pain, stop, sit down, and rest.     If you feel \"wiped out\" the day after you exercise, walk slower or for a shorter distance until you can work up to a better pace. Get enough rest at night. Sleeping with 1 or 2 pillows under your upper body and head may help you breathe easier. Lifestyle changes    Do not smoke. Smoking can make a heart condition worse. If you need help quitting, talk to your doctor about stop-smoking programs and medicines. These can increase your chances of quitting for good. Quitting smoking may be the most important step you can take to protect your heart. Limit alcohol to 2 drinks a day for men and 1 drink a day for women. Too much alcohol can cause health problems. Avoid getting sick from colds and the flu. Get a pneumococcal vaccine shot. If you have had one before, ask your doctor whether you need another dose. Get a flu shot each year. If you must be around people with colds or the flu, wash your hands often. When should you call for help? Call 911  if you have symptoms of sudden heart failure such as: You have severe trouble breathing. You cough up pink, foamy mucus. You have a new irregular or rapid heartbeat. Call your doctor now or seek immediate medical care if:    You have new or increased shortness of breath. You are dizzy or lightheaded, or you feel like you may faint. You have sudden weight gain, such as more than 2 to 3 pounds in a day or 5 pounds in a week. (Your doctor may suggest a different range of weight gain.)     You have increased swelling in your legs, ankles, or feet. You are suddenly so tired or weak that you cannot do your usual activities. Watch closely for changes in your health, and be sure to contact your doctor ifyou develop new symptoms. Where can you learn more? Go to [email or web address] and sign in to your [email or web address] account. Enter K051 in the Networked Insights box to learn more about \"Heart Failure: Care Instructions. \"     If you do not have an account, please click on the \"Sign Up Now\" link. Current as of: January 10, 2022               Content Version: 13.3  © 2006-2022 Healthwise, Incorporated. Care instructions adapted under license by [facility] ([facility]). If you have questions about a medical condition or this instruction, always ask your healthcare professional. [place] [address] any warranty or liability for your use of this information.        +

## 2022-07-25 NOTE — PROGRESS NOTES
[facility] CARDIOLOGY  [address] [facility], [address] [facility], [address] [place] [address]  PHONE: [phone]      22    NAME:  Patrick Atkins  : 1950  MRN: [id number]         SUBJECTIVE:   Patrick Atkins is a 67 y.o. male seen for a follow up visit regarding the following:     Chief Complaint   Patient presents with    Coronary Artery Disease     4 mo f/u     Irregular Heart Beat            HPI:  Follow up  Coronary Artery Disease (4 mo f/u ) and Irregular Heart Beat   . Follow up hypertensive urgency previously d/t inadvertent Na indiscretion, ischemic CM, prior CABG. BP was good at current dose Entresto on nurse follow up. Exercising regularly without cp or dyspnea. Walking treadmill and riding bike without symptoms           PAST CARDIAC HISTORY:   Sep 2021- NSTEMI, subtotal LM occlusion, emergent CABG - LIMA-LAD, SVG-OM. Large dominant RCA with 40-50% narrowing   Echo EF 40-45% anteroseptal and inferoapical wma   Atrial flutter - rate controlled, anticoagulated (noted incidentally cardiac rehab)   Mar 2022- LVH, LVE, EF 35-40%, mild MR, XIOMARA          Past Medical History, Past Surgical History, Family history, Social History, and Medications were all reviewed with the patient today and updated as necessary. Prior to Admission medications    Medication Sig Start Date End Date Taking?  Authorizing Provider   acetaminophen (TYLENOL) 325 MG tablet Take 650 mg by mouth every 6 hours as needed 21  Yes Ar Automatic Reconciliation   amLODIPine (NORVASC) 5 MG tablet Take 5 mg by mouth daily 22  Yes Ar Automatic Reconciliation   aspirin 81 MG EC tablet Take 81 mg by mouth   Yes Ar Automatic Reconciliation   dapagliflozin (FARXIGA) 10 MG tablet Take 10 mg by mouth daily 21  Yes Ar Automatic Reconciliation   metFORMIN (GLUCOPHAGE) 850 MG tablet Take 850 mg by mouth 2 times daily (with meals)   Yes Ar Automatic Reconciliation   metoprolol succinate (TOPROL XL) 50 MG extended release tablet Take 50 mg by mouth 3/16/22  Yes Ar Automatic Reconciliation   nitroGLYCERIN (NITROSTAT) 0.4 MG SL tablet Place 0.4 mg under the tongue 9/29/21  Yes Ar Automatic Reconciliation   rivaroxaban (XARELTO) 20 MG TABS tablet Take 20 mg by mouth Daily with supper 2/17/22  Yes Ar Automatic Reconciliation   rosuvastatin (CRESTOR) 20 MG tablet Take 20 mg by mouth 3/16/22  Yes Ar Automatic Reconciliation   sacubitril-valsartan (ENTRESTO) 49-51 MG per tablet Take 1 tablet by mouth 2 times daily 4/4/22  Yes Ar Automatic Reconciliation   spironolactone (ALDACTONE) 25 MG tablet Take 25 mg by mouth daily 2/17/22  Yes Ar Automatic Reconciliation     No Known Allergies  Past Medical History:   Diagnosis Date    Acute combined systolic and diastolic ACC/AHA stage C congestive heart failure ([facility] [address] 12/4/2021    Arrhythmia     CAD (coronary artery disease)     Diabetes ([facility] [address]     GERD (gastroesophageal reflux disease)     Heart failure (HCC)     HTN (hypertension)     Hypercholesteremia     Prediabetes     no meds     Past Surgical History:   Procedure Laterality Date    KNEE ARTHROSCOPY      to L    PTCA  2000    stents x 2    SHOULDER ARTHROSCOPY      to L     History reviewed. No pertinent family history. Social History     Tobacco Use    Smoking status: Never    Smokeless tobacco: Never   Substance Use Topics    Alcohol use: No       ROS:    Review of Systems   Cardiovascular:  Negative for chest pain. Respiratory:  Negative for shortness of breath.          PHYSICAL EXAM:   /60   Pulse 72   Ht 6' 1\" (1.854 m)   Wt 266 lb (120.7 kg)   BMI 35.09 kg/m²      Wt Readings from Last 3 Encounters:   07/25/22 266 lb (120.7 kg)   03/16/22 262 lb (118.8 kg)   03/09/22 256 lb (116.1 kg)     BP Readings from Last 3 Encounters:   07/25/22 110/60   03/29/22 110/62   03/16/22 122/70     Pulse Readings from Last 3 Encounters:   07/25/22 72   03/29/22 76   03/16/22 72           Physical Exam  Constitutional:       Appearance: Normal appearance. HENT:      Head: Normocephalic and atraumatic. Eyes:      Conjunctiva/sclera: Conjunctivae normal.   Neck:      Vascular: No carotid bruit. Cardiovascular:      Rate and Rhythm: Normal rate and regular rhythm. Heart sounds: No murmur heard. No friction rub. No gallop. Pulmonary:      Effort: No respiratory distress. Breath sounds: No wheezing or rales. Musculoskeletal:         General: No swelling. Cervical back: Neck supple. Skin:     General: Skin is warm and dry. Neurological:      General: No focal deficit present. Mental Status: He is alert. Psychiatric:         Mood and Affect: Mood normal.         Behavior: Behavior normal.       Medical problems and test results were reviewed with the patient today. DATA REVIEW    LIPID PANEL     Lab Results   Component Value Date    CHOL 92 09/24/2021     Lab Results   Component Value Date    TRIG 117 09/24/2021     Lab Results   Component Value Date    HDL 33 (L) 09/24/2021     Lab Results   Component Value Date    LDLCALC 35.6 09/24/2021     Lab Results   Component Value Date    LABVLDL 23.4 (H) 09/24/2021     Lab Results   Component Value Date    CHOLHDLRATIO 2.8 09/24/2021 5/16/22 1133     Triglycerides <150 mg/dL 86    Cholesterol <200 mg/dL 113    HDL Cholesterol >59 mg/dL 43 Low     LDL, Calculated <130 mg/dL 53    Comment: Desirable: < 130 mg/dL;  Borderline 130 - 159 mg/dL   VLDL Cholesterol <40 mg/dL 17    Chol/HDL Ratio <3.5 2.6    Non-HDL Cholesterol <130 mg/dL 70        BMP  Lab Results   Component Value Date/Time     03/29/2022 04:33 PM    K 3.9 03/29/2022 04:33 PM     03/29/2022 04:33 PM    CO2 27 03/29/2022 04:33 PM    BUN 18 03/29/2022 04:33 PM    CREATININE 1.10 03/29/2022 04:33 PM    GLUCOSE 135 03/29/2022 04:33 PM    CALCIUM 9.3 03/29/2022 04:33 PM          EKG        CXR/IMAGING        DEVICE INTERROGATION        OUTSIDE RECORDS REVIEW    Records from outside providers have been reviewed and summarized as noted in the HPI, past history and data review sections of this note, and reviewed with patient. .       ASSESSMENT and PLAN    Junito Bryant was seen today for coronary artery disease and irregular heart beat. Diagnoses and all orders for this visit:    Chronic HFrEF (heart failure with reduced ejection fraction) ([facility])    Atrial fibrillation and flutter ([facility] [address]    Coronary artery disease involving native coronary artery of native heart without angina pectoris        IMPRESSION:    No angina, continue meds and lifestyle modification       Rate controlled, anticoagulated, continue meds      Lipids at goal, continue meds      NYHA I continue meds        Return in about 6 months (around 1/25/2023). Thank you for allowing me to participate in this patient's care. Please call or contact me if there are any questions or concerns regarding the above.       Kenneth Franco MD  07/25/22  3:36 PM

## 2022-08-02 ENCOUNTER — HOSPITAL ENCOUNTER (EMERGENCY)
Age: 72
Discharge: HOME | End: 2022-08-02
Attending: EMERGENCY MEDICINE
Payer: MEDICARE

## 2022-08-02 ENCOUNTER — HOSPITAL ENCOUNTER (EMERGENCY)
Dept: GENERAL RADIOLOGY | Age: 72
Discharge: HOME | End: 2022-08-05
Payer: MEDICARE

## 2022-08-02 VITALS
HEART RATE: 75 BPM | SYSTOLIC BLOOD PRESSURE: 122 MMHG | TEMPERATURE: 98.7 F | DIASTOLIC BLOOD PRESSURE: 75 MMHG | OXYGEN SATURATION: 97 % | WEIGHT: 260 LBS | RESPIRATION RATE: 18 BRPM | BODY MASS INDEX: 34.46 KG/M2 | HEIGHT: 73 IN

## 2022-08-02 DIAGNOSIS — M25.462 EFFUSION OF LEFT KNEE: Primary | ICD-10-CM

## 2022-08-02 PROCEDURE — 99283 EMERGENCY DEPT VISIT LOW MDM: CPT

## 2022-08-02 PROCEDURE — 73562 X-RAY EXAM OF KNEE 3: CPT

## 2022-08-02 ASSESSMENT — PAIN - FUNCTIONAL ASSESSMENT: PAIN_FUNCTIONAL_ASSESSMENT: NONE - DENIES PAIN

## 2022-08-02 NOTE — ED TRIAGE NOTES
Pt to ED c/o L knee swelling since yesterday. Pt states he went bicycling yesterday. Pt states he tried using ice.

## 2022-08-02 NOTE — ED NOTES
Applied 6 inch ace wrap to pts left knee. I have reviewed discharge instructions with the patient. The patient verbalized understanding. Patient left ED via Discharge Method: ambulatory to Home. Opportunity for questions and clarification provided. Patient given 0 scripts. To continue your aftercare when you leave the hospital, you may receive an automated call from our care team to check in on how you are doing. This is a free service and part of our promise to provide the best care and service to meet your aftercare needs.  If you have questions, or wish to unsubscribe from this service please call [phone]. Thank you for Choosing our [facility] Emergency Department.         Kelle Ribeiro RN  08/02/22 [id number]

## 2022-08-02 NOTE — ED PROVIDER NOTES
[facility] Emergency Department Provider Note                   PCP:                Reyna Lyons MD               Age: 67 y.o. Sex: male       ICD-10-CM    1. Effusion of left knee  M25.462           DISPOSITION Decision To Discharge 08/02/2022 12:47:19 PM        MDM  Number of Diagnoses or Management Options  Effusion of left knee: new, needed workup     Amount and/or Complexity of Data Reviewed  Tests in the radiology section of CPT®: ordered and reviewed  Decide to obtain previous medical records or to obtain history from someone other than the patient: yes  Review and summarize past medical records: yes  Independent visualization of images, tracings, or specimens: yes    Risk of Complications, Morbidity, and/or Mortality  Presenting problems: moderate  Diagnostic procedures: moderate  Management options: moderate     70-year-old male presents to the ED with complaint of left knee swelling x1 day. As in HPI. He is weightbearing and able to without difficulty. No difficulty closing joint space but denies pain. Full active range of motion palpitation. Denies deformity of the knee at any time, eversion of the knee, distal to the knee, numbness or tingling or weakness, recent puncture wound, recent illness. Reports he has remote knee surgery where they \"remove cartilage. Is a reassuring exam.  The knee is without guarding patella, there is no erythema, no rash, forage motion, negative drawer, negative Lachman, no varum, no valgum. No lower extremity tenderness, pitting edema. Skin is normal color temperature. Intact DP and PT pulses. Radiograph with effusion, suspect traumatic secondary to patient's workout on bicycle. There is possible intra-articular foreign bodies, likely cartilage. Patient is well-appearing, afebrile, hemodynamically stable. Low suspicion of septic joint, gout flare, DVT, dislocation, other acute vascular or other emergent process.   Will treat conservatively, discharged home to follow-up with orthopedics, given strict return precautions. Made aware of danger signs. Patient is well-hydrated appearing, no distress. Nontoxic-appearing, tolerating oral intake, hemodynamically stable. All findings and plan were discussed with the patient. All questions answered. Discussed with the patient that an unremarkable evaluation in the ED does not preclude the development or presence of a serious or life threatening condition. Patient was instructed to return immediately for any worsening or change in current symptoms, or if symptoms do not continue to improve. I instructed them to follow up with their primary care provider, own specialist, or medical provider that I am recommending for him within the next 2-3 days  The patient acknowledged understanding plan of care and affirmed approval.     Signed by: EAN Portillo     This note created using Dragon voice recognition software. Please excuse any accidental errors associated with its use, as note has not been fully proofread and edited. Orders Placed This Encounter   Procedures    XR KNEE LEFT (3 VIEWS)    Apply ace wrap        Narda Christiansen is a 67 y.o. male who presents to the Emergency Department with chief complaint of    Chief Complaint   Patient presents with    Knee Problem      HPI  Fco 77-year-old male presents to the ED with complaint of left knee swelling x1 day. States he noticed symptoms yesterday morning, soon after he exercised on a stationary bicycle. States that he had no knee swelling before this time. Has no knee pain. Denies fall, bruise in the leg, deformity of the knee, pain distal to the knee, numbness//tingling or weakness in all other complaint. Denies puncture wound, recent illness, fever chills, difficulty closing joint space. Weightbearing and able to without difficulty. No known therapeutic measures. Nothing noted make worse or better. No history of PE or DVT.   Fco well-appearing full reports prior history of surgery on this knee. Review of Systems  Constitutional: Negative for fever. Negative for appetite change, chills, diaphoresis and unexpected weight change. HENT: Negative     Eyes: Negative   Respiratory: Negative  Cardiovascular: Negative  Musculoskeletal: As in HPI  Skin: Negative     Allergic/Immunologic: Negative  Neurological: Negative                        Past Medical History:   Diagnosis Date    Acute combined systolic and diastolic ACC/AHA stage C congestive heart failure ([address] 12/4/2021    Arrhythmia     CAD (coronary artery disease)     Diabetes (HCC)     GERD (gastroesophageal reflux disease)     Heart failure (HCC)     HTN (hypertension)     Hypercholesteremia     Prediabetes     no meds        Past Surgical History:   Procedure Laterality Date    KNEE ARTHROSCOPY      to L    PTCA  2000    stents x 2    SHOULDER ARTHROSCOPY      to L        History reviewed. No pertinent family history. Social History     Socioeconomic History    Marital status:      Spouse name: None    Number of children: None    Years of education: None    Highest education level: None   Tobacco Use    Smoking status: Never    Smokeless tobacco: Never   Substance and Sexual Activity    Alcohol use: No    Drug use: No         Patient has no known allergies.      Previous Medications    ACETAMINOPHEN (TYLENOL) 325 MG TABLET    Take 650 mg by mouth every 6 hours as needed    AMLODIPINE (NORVASC) 5 MG TABLET    Take 5 mg by mouth daily    ASPIRIN 81 MG EC TABLET    Take 81 mg by mouth    DAPAGLIFLOZIN (FARXIGA) 10 MG TABLET    Take 10 mg by mouth daily    METFORMIN (GLUCOPHAGE) 850 MG TABLET    Take 850 mg by mouth 2 times daily (with meals)    METOPROLOL SUCCINATE (TOPROL XL) 50 MG EXTENDED RELEASE TABLET    Take 50 mg by mouth    NITROGLYCERIN (NITROSTAT) 0.4 MG SL TABLET    Place 0.4 mg under the tongue    RIVAROXABAN (XARELTO) 20 MG TABS TABLET    Take 20 mg by mouth Daily with supper    ROSUVASTATIN (CRESTOR) 20 MG TABLET    Take 20 mg by mouth    SACUBITRIL-VALSARTAN (ENTRESTO) 49-51 MG PER TABLET    Take 1 tablet by mouth 2 times daily    SPIRONOLACTONE (ALDACTONE) 25 MG TABLET    Take 25 mg by mouth daily        Vitals signs and nursing note reviewed. Patient Vitals for the past 4 hrs:   Temp Pulse Resp BP SpO2   08/02/22 1159 98.7 °F (37.1 °C) 75 18 122/75 97 %          Physical Exam   Constitutional: Oriented to person, place, and time. Appears well-developed and well-nourished. No distress. HENT:    Head: Normocephalic and atraumatic   Right Ear: External ear normal.    Left Ear: External ear normal.     Nose: Nose normal.   Mouth/Throat: Mouth normal.    Eyes: Conjunctivae are normal.   Neck: Supple. No tracheal deviation. Cardiovascular: Normal rate, intact distal pulses. Brisk capillary refill intact, less than 2 seconds. Regular rhythm present. No pitting edema. Pulmonary/Chest: Lungs are clear & equal bilaterally. No adventitious sounds. No respiratory distress. Abdominal: Soft. There is no tenderness. Musculoskeletal: No obvious deformity, erythema. No tenderness of the L, upper leg, knee, lower leg, ankle or foot. Neurological: Alert and oriented to person, place, and time. No numbness/tingling. No loss of sensation. Positive PMS ×4. GCS= 15. Skin: Skin is warm and dry. Capillary refill takes less than 2 seconds. No abrasion, no lesion, no petechiae and no rash noted. Not diaphoretic. No cyanosis, erythema, or pallor. Psychiatric: Normal mood and affect. Behavior is normal.    Nursing note and vitals reviewed. Procedures      Labs Reviewed - No data to display     XR KNEE LEFT (3 VIEWS)   Final Result      1. No acute osseous abnormality. 2.  Moderate joint effusion, nonspecific. 3.  Moderate tricompartmental OA with suggestion of intra-articular loose   bodies.                                 Voice dictation software was used during the making of this note. This software is not perfect and grammatical and other typographical errors may be present. This note has not been completely proofread for errors.      FELIX Murillo - CNP  08/02/22 2143

## 2022-08-12 ENCOUNTER — OFFICE VISIT (OUTPATIENT)
Dept: ORTHOPEDIC SURGERY | Age: 72
End: 2022-08-12
Payer: MEDICARE

## 2022-08-12 VITALS — WEIGHT: 260 LBS | BODY MASS INDEX: 34.3 KG/M2

## 2022-08-12 DIAGNOSIS — M17.12 PRIMARY OSTEOARTHRITIS OF LEFT KNEE: Primary | ICD-10-CM

## 2022-08-12 PROCEDURE — G8417 CALC BMI ABV UP PARAM F/U: HCPCS | Performed by: ORTHOPAEDIC SURGERY

## 2022-08-12 PROCEDURE — 1123F ACP DISCUSS/DSCN MKR DOCD: CPT | Performed by: ORTHOPAEDIC SURGERY

## 2022-08-12 PROCEDURE — 20610 DRAIN/INJ JOINT/BURSA W/O US: CPT | Performed by: ORTHOPAEDIC SURGERY

## 2022-08-12 PROCEDURE — G8428 CUR MEDS NOT DOCUMENT: HCPCS | Performed by: ORTHOPAEDIC SURGERY

## 2022-08-12 PROCEDURE — 99205 OFFICE O/P NEW HI 60 MIN: CPT | Performed by: ORTHOPAEDIC SURGERY

## 2022-08-12 PROCEDURE — 3017F COLORECTAL CA SCREEN DOC REV: CPT | Performed by: ORTHOPAEDIC SURGERY

## 2022-08-12 PROCEDURE — 1036F TOBACCO NON-USER: CPT | Performed by: ORTHOPAEDIC SURGERY

## 2022-08-12 RX ORDER — METHYLPREDNISOLONE ACETATE 40 MG/ML
40 INJECTION, SUSPENSION INTRA-ARTICULAR; INTRALESIONAL; INTRAMUSCULAR; SOFT TISSUE ONCE
Status: COMPLETED | OUTPATIENT
Start: 2022-08-12 | End: 2022-08-12

## 2022-08-12 RX ORDER — METHYLPREDNISOLONE ACETATE 40 MG/ML
40 INJECTION, SUSPENSION INTRA-ARTICULAR; INTRALESIONAL; INTRAMUSCULAR; SOFT TISSUE ONCE
Qty: 1 ML | Refills: 0 | Status: CANCELLED
Start: 2022-08-12 | End: 2022-08-12

## 2022-08-12 RX ADMIN — METHYLPREDNISOLONE ACETATE 40 MG: 40 INJECTION, SUSPENSION INTRA-ARTICULAR; INTRALESIONAL; INTRAMUSCULAR; SOFT TISSUE at 11:07

## 2022-08-12 NOTE — PROGRESS NOTES
Name: Cephus Denver  YOB: 1950  Gender: male  MRN: [id number]    CC: Left knee pain    HPI: Cephus Denver is a 67 y.o. male who presents primarily with a concern of relatively significant left knee pain. He does have remote history of open arthrotomy as well as arthroscopic surgery. His left knee has been stiff for some time he describes pain with activity. He underwent open heart surgery in fall 2021 and has been trying to engage in cardiac rehab. This included walking as well as riding a stationary bike. He has found these activities difficult because of pain and stiffness in the left knee. He recently, following a 45-minute episode on the bike, had a significant period of exacerbated pain and swelling in his left knee and with frustration presented to the emergency room. X-rays were done and it was suggested he follow-up with orthopedic surgeon given relatively advanced degenerative change. History was obtained from patient, spouse, and patient is retired from sales. ROS/Meds/PSH/PMH/FH/SH: I personally reviewed the patients standard intake form. Below are the pertinents    Tobacco:  reports that he has never smoked. He has never used smokeless tobacco.  Past Medical History:   Diagnosis Date    Acute combined systolic and diastolic ACC/AHA stage C congestive heart failure ([address] 12/4/2021    Arrhythmia     CAD (coronary artery disease)     Diabetes (HCC)     GERD (gastroesophageal reflux disease)     Heart failure (HCC)     HTN (hypertension)     Hypercholesteremia     Prediabetes     no meds      Past Surgical History:   Procedure Laterality Date    KNEE ARTHROSCOPY      to L    PTCA  2000    stents x 2    SHOULDER ARTHROSCOPY      to L        Physical Examination:  Physical exam: On examination of the patient's gait there is antalgia in stance on the left side.      On examination while standing there is decreased flexion in the lumbosacral spine without acute list or spasm. While seated ,  the Bilateral hip is examined there is full range of motion without obvious issue . Donna Stands On examination of the  Left knee :ROM is 0 to 90 There is significant tenderness to direct palpation, There is a mild effusion, and he does have a small old medial arthrotomy incision consistent with his previous surgical history. . On examination of the  Right knee :ROM is 0 to 125 There is no obvious effusion. Patient is neurologically intact distally. Skin is intact. Imaging:   Radiographs are independently reviewed by me from his recent visit to [facility]. These include an AP lateral oblique of the left knee. This shows severe joint space narrowing in all 3 compartments with relatively significant osteophyte formation. Radiographic impression severe DJD left knee    Assessment:   Degenerative joint disease of the left Knee. This is been longstanding been longstanding and progressively worsening. I did discuss with the patient the source of the pain and treatment options. We discussed nonsurgical measures including:Injection therapy, Bracing, Activity Modification, and Use of assistive device. We also discussed the definitive option of total Knee arthroplasty. I counseled the patient regarding the nature of the procedure the preoperative preparation necessary postop recovery and expected outcome. I counseled them regarding the risks of surgery including risk of infection, DVT formation, loss of motion, dislocation and stiffness. This patient has significant factors which may increase their surgical risk which include:, Significant previous cardiovascular history which may increase cardiovascular risk.   This will necessitate preoperative clearance via their cardiologist., and Chronic anticoagulation which will require more complex perioperative management, consultation with their primary provider, and increased risk for wound complications and bleeding issues. We discussed time return to work , general activity and long-term expectations. I described the [address] program for the patient and expected time for hospitalization. This patient, with their level of home support, medical comorbidities, and general ambulatory function expected to have an overnight stay in the hospital prior to discharge. I would plan a Cementless Mobile Depuy Attune TKA. Velys Robot-Assisted. I discussed Robot-assisted surgery with the patient I discussed my implant selection process and rationale with the patient. Patient would like to consider options, if they would like to proceed with surgery they will let us know. If so, it will be a category 1 in technical complexity. This reflects my expectation for surgical time and difficulty but does not indicate the overall complexity of the patient's care. He does have a history of chronic shoulder issues on the left as well would like to see someone regarding that and will make that arrangement. Plan:       Follow up:   Return for Shoulder MD, Surgery with me.      Dario Nunes MD    Name: Brenda Hyatt  YOB: 1950  Gender: male  MRN: [id number]        Current Outpatient Medications:     XARELTO 20 MG TABS tablet, TAKE ONE TABLET BY MOUTH ONE TIME DAILY WITH DINNER, Disp: 30 tablet, Rfl: 5    acetaminophen (TYLENOL) 325 MG tablet, Take 650 mg by mouth every 6 hours as needed, Disp: , Rfl:     amLODIPine (NORVASC) 5 MG tablet, Take 5 mg by mouth daily, Disp: , Rfl:     aspirin 81 MG EC tablet, Take 81 mg by mouth, Disp: , Rfl:     dapagliflozin (FARXIGA) 10 MG tablet, Take 10 mg by mouth daily, Disp: , Rfl:     metFORMIN (GLUCOPHAGE) 850 MG tablet, Take 850 mg by mouth 2 times daily (with meals), Disp: , Rfl:     metoprolol succinate (TOPROL XL) 50 MG extended release tablet, Take 50 mg by mouth, Disp: , Rfl:     nitroGLYCERIN (NITROSTAT) 0.4 MG SL tablet, Place 0.4 mg under the tongue, Disp: , Rfl:     rosuvastatin (CRESTOR) 20 MG tablet, Take 20 mg by mouth, Disp: , Rfl:     sacubitril-valsartan (ENTRESTO) 49-51 MG per tablet, Take 1 tablet by mouth 2 times daily, Disp: , Rfl:     spironolactone (ALDACTONE) 25 MG tablet, Take 25 mg by mouth daily, Disp: , Rfl:   No Known Allergies    CC:left knee pain    Impression: Osteoarthritis of the left knee    Procedure: Depomedrol injection     Procedure Note: The left knee was prepped with alcohol. Then the left knee was injected with 1 mL of 0.5% Marcaine and 40mg of depomedrol The patient tolerated the procedure without difficulty.       Savanah Rodrigues MD

## 2022-08-15 RX ORDER — RIVAROXABAN 20 MG/1
TABLET, FILM COATED ORAL
Qty: 30 TABLET | Refills: 5 | Status: SHIPPED | OUTPATIENT
Start: 2022-08-15

## 2022-08-16 ENCOUNTER — TELEPHONE (OUTPATIENT)
Dept: CARDIOLOGY CLINIC | Age: 72
End: 2022-08-16

## 2022-08-16 DIAGNOSIS — M17.12 PRIMARY OSTEOARTHRITIS OF LEFT KNEE: Primary | ICD-10-CM

## 2022-08-16 NOTE — TELEPHONE ENCOUNTER
Patient is having a procedure ( did not have on the form) on 09/22/22 with Dr. Derrell Starks under spinal . Needs Cardiac clearance and Xarelto hold for 3 days.  Fax: [phone]

## 2022-08-16 NOTE — TELEPHONE ENCOUNTER
MEDICATION REFILL REQUEST      Name of Medication:    Dose:    Frequency:    Quantity:    Days' supply:        Pharmacy Name/Location:

## 2022-09-01 ENCOUNTER — TELEPHONE (OUTPATIENT)
Dept: ORTHOPEDIC SURGERY | Age: 72
End: 2022-09-01

## 2022-09-01 ENCOUNTER — PREP FOR PROCEDURE (OUTPATIENT)
Dept: PULMONOLOGY | Age: 72
End: 2022-09-01

## 2022-09-01 DIAGNOSIS — M17.12 PRIMARY OSTEOARTHRITIS OF LEFT KNEE: Primary | ICD-10-CM

## 2022-09-01 RX ORDER — ACETAMINOPHEN 325 MG/1
1000 TABLET ORAL ONCE
Status: CANCELLED | OUTPATIENT
Start: 2022-09-01 | End: 2022-09-01

## 2022-09-01 NOTE — TELEPHONE ENCOUNTER
Pt  wants   to  cancel his surgery  He has  some other issues  to take care of and he will call back

## 2022-10-14 RX ORDER — SACUBITRIL AND VALSARTAN 49; 51 MG/1; MG/1
TABLET, FILM COATED ORAL
Qty: 60 TABLET | Refills: 5 | Status: SHIPPED | OUTPATIENT
Start: 2022-10-14 | End: 2022-10-18

## 2022-10-14 NOTE — TELEPHONE ENCOUNTER
Follow up appointment scheduled for 01/16/2022. Medication, strength, directions and requested pharmacy reviewed/verified.  Prescription pending physician's approval.

## 2022-10-18 RX ORDER — SACUBITRIL AND VALSARTAN 49; 51 MG/1; MG/1
TABLET, FILM COATED ORAL
Qty: 180 TABLET | Refills: 1 | Status: SHIPPED | OUTPATIENT
Start: 2022-10-18

## 2023-01-23 NOTE — PROGRESS NOTES
[facility] CARDIOLOGY  [address] [facility], [address]  PHONE: [phone]      23    NAME:  Carlos Fortune  : 1950  MRN: [id number]         SUBJECTIVE:   Carlos Fortune is a 67 y.o. male seen for a follow up visit regarding the following:     Chief Complaint   Patient presents with    Coronary Artery Disease    Hypertension    Congestive Heart Failure            HPI:  Follow up  Coronary Artery Disease, Hypertension, and Congestive Heart Failure   . Follow up hypertension, CAD, CM, atrial flutter. He feels well, he exercises every day on his bike, took a long walk last week, sweaty but did ok          PAST CARDIAC HISTORY:   Sep 2021- NSTEMI, subtotal LM occlusion, emergent CABG - LIMA-LAD, SVG-OM. Large dominant RCA with 40-50% narrowing   Echo EF 40-45% anteroseptal and inferoapical wma   Atrial flutter - rate controlled, anticoagulated (noted incidentally cardiac rehab)   Mar 2022- LVH, LVE, EF 35-40%, mild MR, XIOMARA             Pena CAD CHF Meds            ENTRESTO 49-51 MG per tablet (Taking)    Sig: TAKE ONE TABLET BY MOUTH TWICE A DAY    XARELTO 20 MG TABS tablet (Taking)    Sig: TAKE ONE TABLET BY MOUTH ONE TIME DAILY WITH DINNER    amLODIPine (NORVASC) 5 MG tablet (Taking)    Class: Historical Med    metoprolol succinate (TOPROL XL) 50 MG extended release tablet (Taking)    Class: Historical Med    rosuvastatin (CRESTOR) 20 MG tablet (Taking)    Class: Historical Med    spironolactone (ALDACTONE) 25 MG tablet (Taking)    Class: Historical Med          Key Antihyperglycemic Medications            dapagliflozin (FARXIGA) 10 MG tablet (Taking)    Class: Historical Med    metFORMIN (GLUCOPHAGE) 850 MG tablet (Taking)    Class: Historical Med                Past Medical History, Past Surgical History, Family history, Social History, and Medications were all reviewed with the patient today and updated as necessary.      Prior to Admission medications   Medication Sig Start Date End Date Taking? Authorizing Provider   ENTRESTO 49-51 MG per tablet TAKE ONE TABLET BY MOUTH TWICE A DAY 10/18/22  Yes Naty Tena MD   XARELTO 20 MG TABS tablet TAKE ONE TABLET BY MOUTH ONE TIME DAILY WITH DINNER 8/15/22  Yes Naty Tena MD   acetaminophen (TYLENOL) 325 MG tablet Take 650 mg by mouth every 6 hours as needed 9/29/21  Yes Ar Automatic Reconciliation   amLODIPine (NORVASC) 5 MG tablet Take 5 mg by mouth daily 2/17/22  Yes Ar Automatic Reconciliation   aspirin 81 MG EC tablet Take 81 mg by mouth   Yes Ar Automatic Reconciliation   dapagliflozin (FARXIGA) 10 MG tablet Take 10 mg by mouth daily 12/5/21  Yes Ar Automatic Reconciliation   metFORMIN (GLUCOPHAGE) 850 MG tablet Take 850 mg by mouth 2 times daily (with meals)   Yes Ar Automatic Reconciliation   metoprolol succinate (TOPROL XL) 50 MG extended release tablet Take 50 mg by mouth 3/16/22  Yes Ar Automatic Reconciliation   nitroGLYCERIN (NITROSTAT) 0.4 MG SL tablet Place 0.4 mg under the tongue 9/29/21  Yes Ar Automatic Reconciliation   rosuvastatin (CRESTOR) 20 MG tablet Take 20 mg by mouth 3/16/22  Yes Ar Automatic Reconciliation   spironolactone (ALDACTONE) 25 MG tablet Take 25 mg by mouth daily 2/17/22  Yes Ar Automatic Reconciliation     No Known Allergies  Past Medical History:   Diagnosis Date    Acute combined systolic and diastolic ACC/AHA stage C congestive heart failure (HCC) 12/4/2021    Arrhythmia     CAD (coronary artery disease)     Diabetes (HCC)     GERD (gastroesophageal reflux disease)     Heart failure (HCC)     HTN (hypertension)     Hypercholesteremia     Prediabetes     no meds    Unilateral primary osteoarthritis, left knee 8/16/2022     Past Surgical History:   Procedure Laterality Date    KNEE ARTHROSCOPY      to L    PTCA  2000    stents x 2    SHOULDER ARTHROSCOPY      to L     History reviewed. No pertinent family history.  Social History     Tobacco Use    Smoking  status: Never    Smokeless tobacco: Never   Substance Use Topics    Alcohol use: No       ROS:    Review of Systems   Cardiovascular:  Negative for chest pain and leg swelling. Respiratory:  Negative for shortness of breath. PHYSICAL EXAM:   /78   Pulse 74   Ht 6' 1\" (1.854 m)   Wt 266 lb 6.4 oz (120.8 kg)   BMI 35.15 kg/m²      Wt Readings from Last 3 Encounters:   01/24/23 266 lb 6.4 oz (120.8 kg)   08/12/22 260 lb (117.9 kg)   08/02/22 260 lb (117.9 kg)     BP Readings from Last 3 Encounters:   01/24/23 136/78   08/02/22 122/75   07/25/22 110/60     Pulse Readings from Last 3 Encounters:   01/24/23 74   08/02/22 75   07/25/22 72           Physical Exam  Constitutional:       Appearance: Normal appearance. HENT:      Head: Normocephalic and atraumatic. Eyes:      Conjunctiva/sclera: Conjunctivae normal.   Neck:      Vascular: No carotid bruit. Cardiovascular:      Rate and Rhythm: Normal rate and regular rhythm. Heart sounds: No murmur heard. No friction rub. No gallop. Pulmonary:      Effort: No respiratory distress. Breath sounds: No wheezing or rales. Musculoskeletal:         General: No swelling. Cervical back: Neck supple. Skin:     General: Skin is warm and dry. Neurological:      General: No focal deficit present. Mental Status: He is alert. Psychiatric:         Mood and Affect: Mood normal.         Behavior: Behavior normal.       Medical problems and test results were reviewed with the patient today.      DATA REVIEW        Triglycerides   Cholesterol   HDL Cholesterol   LDL, Calculated   VLDL Cholesterol   Chol/HDL Ratio   Non-HDL Cholesterol     Component 11/16/2022 08/17/2022 05/16/2022 02/16/2022 11/12/2021 08/11/2021 05/12/2021 12/23/2020 09/16/2020 06/10/2020 03/04/2020                   Triglycerides 155 High     133 86 96 76 339 High     149 193 High     140 98 135 Load older lab results   Cholesterol 135 123 113 121 99 146 138 141 145 127 151 Load older lab results   HDL Cholesterol 38 Low     39 Low     43 Low     38 Low     34 Low     32 Low     30 Low     33 Low     39 Low     37 Low     36 Low     Load older lab results   LDL, Calculated 70 60 53 64 50 46 78 69 78 70 88 Load older lab results   VLDL Cholesterol 27 24 17 19 15 68 High     30 39 28 20 27 Load older lab results   Chol/HDL Ratio 3.6                LIPID PANEL     Lab Results   Component Value Date    CHOL 92 09/24/2021     Lab Results   Component Value Date    TRIG 117 09/24/2021     Lab Results   Component Value Date    HDL 33 (L) 09/24/2021     Lab Results   Component Value Date    LDLCALC 35.6 09/24/2021     Lab Results   Component Value Date    LABVLDL 23.4 (H) 09/24/2021     Lab Results   Component Value Date    CHOLHDLRATIO 2.8 09/24/2021           Hemoglobin A1C   Mean Bld Glu Estim. Hemoglobin A1c   Insulin ([facility])     Component 11/16/2022 08/17/2022 02/16/2022 11/12/2021 08/11/2021 05/12/2021 12/23/2020 09/16/2020 06/10/2020 03/04/2020                  Hemoglobin A1C -- -- 7.6 High     7.7 High     9.1 High     10.3 High     11.5 High     7.6 High     8.8 High     9.7 High     Load older lab results   Mean Bld Glu Estim.  189 183 171 -- -- -- -- -- -- 232 High     Load older lab results   Hemoglobin A1c 8.2 High                    BMP  Lab Results   Component Value Date/Time     03/29/2022 04:33 PM    K 3.9 03/29/2022 04:33 PM     03/29/2022 04:33 PM    CO2 27 03/29/2022 04:33 PM    BUN 18 03/29/2022 04:33 PM    CREATININE 1.10 03/29/2022 04:33 PM    GLUCOSE 135 03/29/2022 04:33 PM    CALCIUM 9.3 03/29/2022 04:33 PM            Sodium   Potassium   Chloride   CO2   Anion Gap   BUN   Creatinine   Glucose   Calcium   AST   ALT   Alkaline Phosphatase   Protein, Total   Albumin   Bilirubin, Total   eGFR   eGFR African American   CRP, Inflammatory   Gfr Mdrd Af Amer   Co2   Gfr Mdrd Non Af Amer   Protein Total   BUN/Creat Ratio   Globulin, Total   A/G Ratio Component 11/16/2022 08/17/2022 05/16/2022 02/16/2022 11/12/2021 08/11/2021 05/12/2021 12/23/2020 09/16/2020 06/10/2020 03/04/2020                   Sodium 138 139 137 140 137 138 137 132 Low     138 137 137 Load older lab results   Potassium 4.4 4.6 4.1 3.9 3.9 4.0 4.3 4.2 4.0 3.9 3.7 Load older lab results   Chloride 103 103 102 107 105 103 103 103 104 104 103 Load older lab results   CO2 22 23 26 23 22 24 22 23 20 24 21 Load older lab results   Anion Gap -- -- 9 10 10 11 12 6 14 9 13 Load older lab results   BUN 12 12 10 12 10 12 14 20 16 13 12 Load older lab results   Creatinine 0.85 0.79 1.15 0.84 0.82 0.88 1.10 1.02 1.11 0.93 1.06 Load older lab results   Glucose 219 High     170 High     150 High     156 High     216 High     143 High     323 High     309 High     198 High     176 High     278 High     Load older lab results   Calcium 9.6 9.2 9.9 9.5 9.9 9.7 9.9 9.6 10.1 9.5 9.4 Load older lab results   AST 26 19 20 33 18 23 20 22 26 19 23 Load older lab results   ALT 18 16 17 29 21 27 23 22 24 14 25 Load older lab results   Alkaline Phosphatase 62 62 59 72 87 78 83 81 71 63 77 Load older lab results   Protein, Total 7.2                    EKG    Sinus rhythm 73  occasional PVC  normal axis, intervals  Diffuse T wave abnormality    Compared with previous, SR has replaced atrial flutter      CXR/IMAGING        DEVICE INTERROGATION        OUTSIDE RECORDS REVIEW    Records from outside providers have been reviewed and summarized as noted in the HPI, past history and data review sections of this note, and reviewed with patient. .       ASSESSMENT and PLAN    Devin Goodson was seen today for coronary artery disease, hypertension and congestive heart failure.     Diagnoses and all orders for this visit:    Primary hypertension  -     EKG 12 Lead    Atrial fibrillation and flutter (HCC)    Coronary artery disease of native artery of native heart with stable angina pectoris (HCC)    Chronic HFrEF (heart failure with reduced ejection fraction) (HCC)  -     Transthoracic echocardiogram (TTE) complete with contrast, bubble, strain, and 3D PRN; Future    Type 2 diabetes mellitus without complication, without long-term current use of insulin (HCC)        IMPRESSION:    No angina, continue meds and lifestyle modification    NYHA II, continue meds, echo next visit    BP at target, continue meds    Lipids at goal, continue meds    DM control could be better, he's been working on it and has follow up next month        Return in about 6 months (around 7/24/2023) for ECHO BEFORE VISIT. Thank you for allowing me to participate in this patient's care. Please call or contact me if there are any questions or concerns regarding the above.       Chris Greco MD  01/24/23  3:11 PM

## 2023-01-24 ENCOUNTER — OFFICE VISIT (OUTPATIENT)
Dept: CARDIOLOGY CLINIC | Age: 73
End: 2023-01-24
Payer: MEDICARE

## 2023-01-24 VITALS
SYSTOLIC BLOOD PRESSURE: 136 MMHG | HEIGHT: 73 IN | DIASTOLIC BLOOD PRESSURE: 78 MMHG | HEART RATE: 74 BPM | WEIGHT: 266.4 LBS | BODY MASS INDEX: 35.31 KG/M2

## 2023-01-24 DIAGNOSIS — I10 PRIMARY HYPERTENSION: Primary | ICD-10-CM

## 2023-01-24 DIAGNOSIS — I48.91 ATRIAL FIBRILLATION AND FLUTTER (HCC): ICD-10-CM

## 2023-01-24 DIAGNOSIS — I50.22 CHRONIC HFREF (HEART FAILURE WITH REDUCED EJECTION FRACTION) (HCC): ICD-10-CM

## 2023-01-24 DIAGNOSIS — I25.118 CORONARY ARTERY DISEASE OF NATIVE ARTERY OF NATIVE HEART WITH STABLE ANGINA PECTORIS (HCC): ICD-10-CM

## 2023-01-24 DIAGNOSIS — I48.92 ATRIAL FIBRILLATION AND FLUTTER (HCC): ICD-10-CM

## 2023-01-24 DIAGNOSIS — E11.9 TYPE 2 DIABETES MELLITUS WITHOUT COMPLICATION, WITHOUT LONG-TERM CURRENT USE OF INSULIN (HCC): ICD-10-CM

## 2023-01-24 PROCEDURE — 3078F DIAST BP <80 MM HG: CPT | Performed by: INTERNAL MEDICINE

## 2023-01-24 PROCEDURE — G8417 CALC BMI ABV UP PARAM F/U: HCPCS | Performed by: INTERNAL MEDICINE

## 2023-01-24 PROCEDURE — G8427 DOCREV CUR MEDS BY ELIG CLIN: HCPCS | Performed by: INTERNAL MEDICINE

## 2023-01-24 PROCEDURE — 93000 ELECTROCARDIOGRAM COMPLETE: CPT | Performed by: INTERNAL MEDICINE

## 2023-01-24 PROCEDURE — 3075F SYST BP GE 130 - 139MM HG: CPT | Performed by: INTERNAL MEDICINE

## 2023-01-24 PROCEDURE — 3046F HEMOGLOBIN A1C LEVEL >9.0%: CPT | Performed by: INTERNAL MEDICINE

## 2023-01-24 PROCEDURE — 3017F COLORECTAL CA SCREEN DOC REV: CPT | Performed by: INTERNAL MEDICINE

## 2023-01-24 PROCEDURE — G8484 FLU IMMUNIZE NO ADMIN: HCPCS | Performed by: INTERNAL MEDICINE

## 2023-01-24 PROCEDURE — 1036F TOBACCO NON-USER: CPT | Performed by: INTERNAL MEDICINE

## 2023-01-24 PROCEDURE — 2022F DILAT RTA XM EVC RTNOPTHY: CPT | Performed by: INTERNAL MEDICINE

## 2023-01-24 PROCEDURE — 1123F ACP DISCUSS/DSCN MKR DOCD: CPT | Performed by: INTERNAL MEDICINE

## 2023-01-24 PROCEDURE — 99213 OFFICE O/P EST LOW 20 MIN: CPT | Performed by: INTERNAL MEDICINE

## 2023-01-24 ASSESSMENT — ENCOUNTER SYMPTOMS: SHORTNESS OF BREATH: 0

## 2023-02-01 ENCOUNTER — TELEPHONE (OUTPATIENT)
Dept: CARDIOLOGY CLINIC | Age: 73
End: 2023-02-01

## 2023-02-14 RX ORDER — RIVAROXABAN 20 MG/1
TABLET, FILM COATED ORAL
Qty: 30 TABLET | Refills: 11 | Status: SHIPPED | OUTPATIENT
Start: 2023-02-14

## 2023-03-19 RX ORDER — ROSUVASTATIN CALCIUM 20 MG/1
TABLET, COATED ORAL
Qty: 90 TABLET | Refills: 3 | Status: SHIPPED | OUTPATIENT
Start: 2023-03-19

## 2023-05-01 RX ORDER — SACUBITRIL AND VALSARTAN 49; 51 MG/1; MG/1
TABLET, FILM COATED ORAL
Qty: 180 TABLET | Refills: 1 | Status: SHIPPED | OUTPATIENT
Start: 2023-05-01

## 2023-06-01 ENCOUNTER — TELEPHONE (OUTPATIENT)
Dept: ORTHOPEDIC SURGERY | Age: 73
End: 2023-06-01

## 2023-06-29 DIAGNOSIS — M17.12 PRIMARY OSTEOARTHRITIS OF LEFT KNEE: Primary | ICD-10-CM

## 2023-07-25 NOTE — PROGRESS NOTES
124    HDL Cholesterol >39 mg/dL 35 Low     VLDL Cholesterol 5 - 40 mg/dL 22    LDL, Calculated 0 - 99 mg/dL 49    Chol/HDL Ratio 0.0 - 5.0 ratio 3.0       5/24/23 0828    Glucose 70 - 99 mg/dL 206 High     BUN 8 - 27 mg/dL 16    Creatinine 0.76 - 1.27 mg/dL 0.96    BUN/Creat Ratio 10 - 24 17    Sodium 134 - 144 mmol/L 138    Potassium 3.5 - 5.2 mmol/L 4.0    Chloride 96 - 106 mmol/L 101    CO2 20 - 29 mmol/L 21    Calcium 8.6 - 10.2 mg/dL 9.4    Protein, Total 6.0 - 8.5 g/dL 6.9    Albumin 3.7 - 4.7 g/dL 4.5    Globulin, Total 1.5 - 4.5 g/dL 2.4    A/G Ratio 1.2 - 2.2 1.9    Bilirubin, Total 0.0 - 1.2 mg/dL 0.4    Alkaline Phosphatase 44 - 121 IU/L 59    AST 0 - 40 IU/L 20    ALT 0 - 44 IU/L 15    eGFR >59 mL/min/1.73 83        EKG    Sinus  Rhythm 71  normal axis  first degree AV block  LVH with strain      CXR/IMAGING        DEVICE INTERROGATION        OUTSIDE RECORDS REVIEW    Records from outside providers have been reviewed and summarized as noted in the HPI, past history and data review sections of this note, and reviewed with patient. .       ASSESSMENT and PLAN    Marianne Caro was seen today for results, hypertension, atrial fibrillation, atrial flutter, coronary artery disease and congestive heart failure. Diagnoses and all orders for this visit:    Pre-op evaluation  -     EKG 12 Lead    Primary hypertension    Atrial fibrillation and flutter (HCC)    Coronary artery disease involving native coronary artery of native heart without angina pectoris    Chronic HFrEF (heart failure with reduced ejection fraction) (HCC)    Type 2 diabetes mellitus without complication, without long-term current use of insulin (HCC)          IMPRESSION:    Low perioperative cardiovascular risk for knee replacement surgery. Low risk to hold Xarelto 48-72 hours prior, continue low dose ASA. He inquires about Viagra use.   Cautioned regarding potentially fatal interaction with ntg, would have to notify partner if he chooses to use

## 2023-07-26 ENCOUNTER — OFFICE VISIT (OUTPATIENT)
Age: 73
End: 2023-07-26
Payer: MEDICARE

## 2023-07-26 VITALS
DIASTOLIC BLOOD PRESSURE: 60 MMHG | BODY MASS INDEX: 35.31 KG/M2 | WEIGHT: 267.6 LBS | HEART RATE: 71 BPM | SYSTOLIC BLOOD PRESSURE: 126 MMHG

## 2023-07-26 DIAGNOSIS — I50.22 CHRONIC HFREF (HEART FAILURE WITH REDUCED EJECTION FRACTION) (HCC): ICD-10-CM

## 2023-07-26 DIAGNOSIS — I48.92 ATRIAL FIBRILLATION AND FLUTTER (HCC): ICD-10-CM

## 2023-07-26 DIAGNOSIS — I25.10 CORONARY ARTERY DISEASE INVOLVING NATIVE CORONARY ARTERY OF NATIVE HEART WITHOUT ANGINA PECTORIS: ICD-10-CM

## 2023-07-26 DIAGNOSIS — I48.91 ATRIAL FIBRILLATION AND FLUTTER (HCC): ICD-10-CM

## 2023-07-26 DIAGNOSIS — Z01.818 PRE-OP EVALUATION: Primary | ICD-10-CM

## 2023-07-26 DIAGNOSIS — I10 PRIMARY HYPERTENSION: ICD-10-CM

## 2023-07-26 DIAGNOSIS — E11.9 TYPE 2 DIABETES MELLITUS WITHOUT COMPLICATION, WITHOUT LONG-TERM CURRENT USE OF INSULIN (HCC): ICD-10-CM

## 2023-07-26 PROCEDURE — 3017F COLORECTAL CA SCREEN DOC REV: CPT | Performed by: INTERNAL MEDICINE

## 2023-07-26 PROCEDURE — 99214 OFFICE O/P EST MOD 30 MIN: CPT | Performed by: INTERNAL MEDICINE

## 2023-07-26 PROCEDURE — G8417 CALC BMI ABV UP PARAM F/U: HCPCS | Performed by: INTERNAL MEDICINE

## 2023-07-26 PROCEDURE — 93000 ELECTROCARDIOGRAM COMPLETE: CPT | Performed by: INTERNAL MEDICINE

## 2023-07-26 PROCEDURE — 3046F HEMOGLOBIN A1C LEVEL >9.0%: CPT | Performed by: INTERNAL MEDICINE

## 2023-07-26 PROCEDURE — 3074F SYST BP LT 130 MM HG: CPT | Performed by: INTERNAL MEDICINE

## 2023-07-26 PROCEDURE — 1036F TOBACCO NON-USER: CPT | Performed by: INTERNAL MEDICINE

## 2023-07-26 PROCEDURE — 3078F DIAST BP <80 MM HG: CPT | Performed by: INTERNAL MEDICINE

## 2023-07-26 PROCEDURE — G8427 DOCREV CUR MEDS BY ELIG CLIN: HCPCS | Performed by: INTERNAL MEDICINE

## 2023-07-26 PROCEDURE — 1123F ACP DISCUSS/DSCN MKR DOCD: CPT | Performed by: INTERNAL MEDICINE

## 2023-07-26 PROCEDURE — 2022F DILAT RTA XM EVC RTNOPTHY: CPT | Performed by: INTERNAL MEDICINE

## 2023-07-26 ASSESSMENT — ENCOUNTER SYMPTOMS: SHORTNESS OF BREATH: 0

## 2023-08-14 ENCOUNTER — HOSPITAL ENCOUNTER (OUTPATIENT)
Dept: SURGERY | Age: 73
Discharge: HOME | End: 2023-08-17
Payer: MEDICARE

## 2023-08-14 ENCOUNTER — HOSPITAL ENCOUNTER (OUTPATIENT)
Dept: REHABILITATION | Age: 73
Discharge: HOME | End: 2023-08-17
Payer: MEDICARE

## 2023-08-14 VITALS
TEMPERATURE: 97.2 F | DIASTOLIC BLOOD PRESSURE: 73 MMHG | HEIGHT: 73 IN | BODY MASS INDEX: 34.97 KG/M2 | SYSTOLIC BLOOD PRESSURE: 140 MMHG | HEART RATE: 66 BPM | WEIGHT: 263.89 LBS | OXYGEN SATURATION: 97 % | RESPIRATION RATE: 16 BRPM

## 2023-08-14 DIAGNOSIS — Z96.652 STATUS POST LEFT KNEE REPLACEMENT: Primary | ICD-10-CM

## 2023-08-14 LAB
ANION GAP SERPL CALC-SCNC: 5 MMOL/L (ref 2–11)
BUN SERPL-MCNC: 14 MG/DL (ref 8–23)
CALCIUM SERPL-MCNC: 9.2 MG/DL (ref 8.3–10.4)
CHLORIDE SERPL-SCNC: 107 MMOL/L (ref 101–110)
CO2 SERPL-SCNC: 26 MMOL/L (ref 21–32)
CREAT SERPL-MCNC: 1 MG/DL (ref 0.8–1.5)
ERYTHROCYTE [DISTWIDTH] IN BLOOD BY AUTOMATED COUNT: 13.5 % (ref 11.9–14.6)
GLUCOSE SERPL-MCNC: 231 MG/DL (ref 65–100)
HCT VFR BLD AUTO: 39.2 % (ref 41.1–50.3)
HGB BLD-MCNC: 12.9 G/DL (ref 13.6–17.2)
MCH RBC QN AUTO: 28.2 PG (ref 26.1–32.9)
MCHC RBC AUTO-ENTMCNC: 32.9 G/DL (ref 31.4–35)
MCV RBC AUTO: 85.6 FL (ref 82–102)
MRSA DNA SPEC QL NAA+PROBE: NOT DETECTED
NRBC # BLD: 0 K/UL (ref 0–0.2)
PLATELET # BLD AUTO: 204 K/UL (ref 150–450)
PMV BLD AUTO: 10.2 FL (ref 9.4–12.3)
POTASSIUM SERPL-SCNC: 4.4 MMOL/L (ref 3.5–5.1)
RBC # BLD AUTO: 4.58 M/UL (ref 4.23–5.6)
S AUREUS CPE NOSE QL NAA+PROBE: NOT DETECTED
SODIUM SERPL-SCNC: 138 MMOL/L (ref 133–143)
WBC # BLD AUTO: 3.5 K/UL (ref 4.3–11.1)

## 2023-08-14 PROCEDURE — 85027 COMPLETE CBC AUTOMATED: CPT

## 2023-08-14 PROCEDURE — 87641 MR-STAPH DNA AMP PROBE: CPT

## 2023-08-14 PROCEDURE — 97161 PT EVAL LOW COMPLEX 20 MIN: CPT

## 2023-08-14 PROCEDURE — 80048 BASIC METABOLIC PNL TOTAL CA: CPT

## 2023-08-14 RX ORDER — TADALAFIL 10 MG/1
10 TABLET ORAL NIGHTLY
COMMUNITY

## 2023-08-14 RX ORDER — GLIMEPIRIDE 4 MG/1
4 TABLET ORAL 2 TIMES DAILY
COMMUNITY
Start: 2023-05-24

## 2023-08-14 ASSESSMENT — KOOS JR
BENDING TO THE FLOOR TO PICK UP OBJECT: 1
STANDING UPRIGHT: 1
GOING UP OR DOWN STAIRS: 1
KOOS JR TOTAL INTERVAL SCORE: 73.342
STRAIGHTENING KNEE FULLY: 0
HOW SEVERE IS YOUR KNEE STIFFNESS AFTER FIRST WAKING IN MORNING: 0
TWISING OR PIVOTING ON KNEE: 1
RISING FROM SITTING: 1

## 2023-08-14 ASSESSMENT — PAIN DESCRIPTION - ORIENTATION: ORIENTATION: LEFT

## 2023-08-14 ASSESSMENT — PROMIS GLOBAL HEALTH SCALE
IN THE PAST 7 DAYS, HOW WOULD YOU RATE YOUR FATIGUE ON AVERAGE [ON A SCALE FROM 1 (NONE) TO 5 (VERY SEVERE)]?: 5
TO WHAT EXTENT ARE YOU ABLE TO CARRY OUT YOUR EVERYDAY PHYSICAL ACTIVITIES SUCH AS WALKING, CLIMBING STAIRS, CARRYING GROCERIES, OR MOVING A CHAIR [ON A SCALE OF 1 (NOT AT ALL) TO 5 (COMPLETELY)]?: 5
IN GENERAL, HOW WOULD YOU RATE YOUR SATISFACTION WITH YOUR SOCIAL ACTIVITIES AND RELATIONSHIPS [ON A SCALE OF 1 (POOR) TO 5 (EXCELLENT)]?: 5
SUM OF RESPONSES TO QUESTIONS 3, 6, 7, & 8: 15
IN GENERAL, PLEASE RATE HOW WELL YOU CARRY OUT YOUR USUAL SOCIAL ACTIVITIES (INCLUDES ACTIVITIES AT HOME, AT WORK, AND IN YOUR COMMUNITY, AND RESPONSIBILITIES AS A PARENT, CHILD, SPOUSE, EMPLOYEE, FRIEND, ETC) [ON A SCALE OF 1 (POOR) TO 5 (EXCELLENT)]?: 5
HOW IS THE PROMIS V1.1 BEING ADMINISTERED?: 0
SUM OF RESPONSES TO QUESTIONS 2, 4, 5, & 10: 20
IN GENERAL, WOULD YOU SAY YOUR QUALITY OF LIFE IS...[ON A SCALE OF 1 (POOR) TO 5 (EXCELLENT)]: 5
WHO IS THE PERSON COMPLETING THE PROMIS V1.1 SURVEY?: 0
IN GENERAL, WOULD YOU SAY YOUR HEALTH IS...[ON A SCALE OF 1 (POOR) TO 5 (EXCELLENT)]: 5
IN THE PAST 7 DAYS, HOW WOULD YOU RATE YOUR PAIN ON AVERAGE [ON A SCALE FROM 0 (NO PAIN) TO 10 (WORST IMAGINABLE PAIN)]?: 0
IN GENERAL, HOW WOULD YOU RATE YOUR MENTAL HEALTH, INCLUDING YOUR MOOD AND YOUR ABILITY TO THINK [ON A SCALE OF 1 (POOR) TO 5 (EXCELLENT)]?: 5
IN GENERAL, HOW WOULD YOU RATE YOUR PHYSICAL HEALTH [ON A SCALE OF 1 (POOR) TO 5 (EXCELLENT)]?: 5
IN THE PAST 7 DAYS, HOW OFTEN HAVE YOU BEEN BOTHERED BY EMOTIONAL PROBLEMS, SUCH AS FEELING ANXIOUS, DEPRESSED, OR IRRITABLE [ON A SCALE FROM 1 (NEVER) TO 5 (ALWAYS)]?: 5

## 2023-08-14 ASSESSMENT — PULMONARY FUNCTION TESTS
FEV1 (%PREDICTED): 73
FEV1 (LITERS): 2.14

## 2023-08-14 ASSESSMENT — PAIN DESCRIPTION - DESCRIPTORS: DESCRIPTORS: ACHING

## 2023-08-14 ASSESSMENT — PAIN - FUNCTIONAL ASSESSMENT: PAIN_FUNCTIONAL_ASSESSMENT: PREVENTS OR INTERFERES SOME ACTIVE ACTIVITIES AND ADLS

## 2023-08-14 ASSESSMENT — PAIN DESCRIPTION - LOCATION
LOCATION: KNEE
LOCATION: KNEE

## 2023-08-14 NOTE — CARE COORDINATION
[facility] Case Management note:  Patient screened in Prehab for discharge planning needs. Patient scheduled for a future total joint replacement. We discussed Home Health and equipment needed after surgery. List of Home Health providers offered. Patient w/o preference towards provider. We will arrange [facility] on the day of surgery.   Will need a walker

## 2023-08-14 NOTE — PERIOP NOTE
Lab results within anesthesia guidelines, no follow-up required. Labs automatically routed to ordering provider via Epic documentation.

## 2023-08-14 NOTE — PERIOP NOTE
Patient verified name and . Order for consent not found in EHR. Type 3 surgery, PAT Joint assessment complete. Labs per surgeon: no orders received. Labs per anesthesia protocol: CBC,BMP; results pending. EKG:Found in EHR- done 23 and within anesthesia guidelines. Last cardiology office note with clearance and xarelto hold dated 23, Echo dated 23 and cath report dated 21 found in EHR if needed for anesthesia reference. Per cardiology office note dated 23- Low perioperative cardiovascular risk for knee replacement surgery. Low risk to hold Xarelto 48-72 hours prior, continue low dose ASA. MRSA/MSSA swab collected; pharmacy to review and dose antibiotic as appropriate. Hospital approved surgical skin cleanser and instructions to return bottle on DOS given per hospital policy. Patient provided with handouts including Guide to Surgery, Pain Management, Hand Hygiene, Blood Transfusion Education, and [facility] Brochure. Patient answered medical/surgical history questions at their best of ability. All prior to admission medications documented in [facility]. Original medication prescription bottle not visualized during patient appointment. Patient instructed to hold all vitamins 3 weeks prior to surgery and NSAIDS 5 days prior to surgery. Patient teach back successful and patient demonstrates knowledge of instruction.

## 2023-08-14 NOTE — PERIOP NOTE
PLEASE CONTINUE TAKING ALL PRESCRIPTION MEDICATIONS UP TO THE DAY OF SURGERY UNLESS OTHERWISE DIRECTED BELOW. DISCONTINUE all vitamins, herbals and supplements 3 weeks prior to surgery. DISCONTINUE Non-Steriodal Anti-Inflammatory (NSAIDS) such as Advil, Ibuprofen, Motrin, Naproxen and Aleve 5 days prior to surgery. Home Medications to take  the day of surgery    Amlodipine, Entresto           Home Medications   to Hold   Hold Tadalafil five days prior to surgery    Hold xarelto 3 days prior to surgery (last dose 9/1/23)     Comments   On the day before surgery please take Acetaminophen 1000mg in the morning and then again before bed. You may substitute for Tylenol 650 mg.      Drink Ensure Pre-Surgery 2 bottles the night before surgery and 1/2 bottle   2 hours prior to your arrival time. Bring Dynahex wash and Incentive Spirometer with you to hospital on the day of surgery. Please do not bring home medications with you on the day of surgery unless otherwise directed by your nurse. If you are instructed to bring home medications, please give them to your nurse as they will be administered by the nursing staff. If you have any questions, please call [address] [place] [address] [phone] option 7. A copy of this note was provided to the patient for reference.

## 2023-08-14 NOTE — PROGRESS NOTES
Josué Paul  : 1950  Primary: Medicare Part A And B  Secondary: [address] at [facility]  [address] [facility] [place] [address]  [phone]      Physical Therapy Prehab Evaluation Summary:2023   Time In/Out   PT Charge Capture  Episode     MEDICAL/REFERRING DIAGNOSIS: Unilateral primary osteoarthritis, left knee [M17.12]  REFERRING PHYSICIAN: Tam Gandhi MD    ICD-10: Treatment Diagnosis:   Pain in Left Knee (M25.562)  Stiffness of Left Knee, Not elsewhere classified (M25.662)  Difficulty in walking, Not elsewhere classified (R26.2)    DATE OF SURGERY: 23  Assessment:   COMMENTS:  Pt. Plans to go home with wife. He can come in the front door and only have to do 4 steps. PROBLEM LIST:   (Impacting functional limitations):  Mr. Libby Baltazar presents with the following lower extremity(s) problems:  Strength  Range of Motion  Home Exercise Program  Pain INTERVENTIONS PLANNED:   (Benefits and precautions of physical therapy have been discussed with the patient.)  Home Exercise Program  Educational Discussion       GOALS: (Goals have been discussed and agreed upon with patient.)  Discharge Goals: Time Frame: 1 Day  Patient will demonstrate independence with a home exercise program designed to increase strength, range of motion, and pain control to minimize functional deficits and optimize patient for total joint replacement. Subjective:   Past Medical History/Comorbidities:   Mr. Libby Baltazar  has a past medical history of Acute combined systolic and diastolic ACC/AHA stage C congestive heart failure ([address]), Atrial fibrillation and flutter ([address]), BPH (benign prostatic hyperplasia), CAD (coronary artery disease), Diabetes ([address]), GERD (gastroesophageal reflux disease), H/O echocardiogram, Heart failure ([address]), HTN (hypertension), Hypercholesteremia, and Unilateral primary osteoarthritis, left knee.   Mr. Libby Baltazar  has a past surgical history that

## 2023-08-15 ENCOUNTER — PREP FOR PROCEDURE (OUTPATIENT)
Dept: ORTHOPEDIC SURGERY | Age: 73
End: 2023-08-15

## 2023-08-15 DIAGNOSIS — M17.12 PRIMARY OSTEOARTHRITIS OF LEFT KNEE: Primary | ICD-10-CM

## 2023-08-15 RX ORDER — ACETAMINOPHEN 500 MG
1000 TABLET ORAL ONCE
Status: CANCELLED | OUTPATIENT
Start: 2023-08-15 | End: 2023-08-15

## 2023-09-01 ENCOUNTER — OFFICE VISIT (OUTPATIENT)
Dept: ORTHOPEDIC SURGERY | Age: 73
End: 2023-09-01

## 2023-09-01 DIAGNOSIS — M17.12 PRIMARY OSTEOARTHRITIS OF LEFT KNEE: Primary | ICD-10-CM

## 2023-09-01 NOTE — H&P
H&P    Patient ID:  Jana Pham  [id number]  67 y.o.  1950  Surgeon:  Almas Navarrete MD  Date of Surgery: * No surgery date entered *  Procedure:  Robot assisted Left Total Knee Arthroplasty  Primary Care Physician: Latricia Isabel MD        Subjective:  Jana Pham is a 68 y.o. Black / [place] American male who presents with left knee pain. They have a history of left knee pain for several months. Symptoms worse with walking long distances and relieved with rest. Conservative treatment consisting of  activity modification and injections have not helped. The patient lives with their family. The patients goal after surgery is improved pain and function. Past Medical History:   Diagnosis Date    Acute combined systolic and diastolic ACC/AHA stage C congestive heart failure ([address]) 12/4/2021    Atrial fibrillation and flutter (HCC)     Xarelto, followed by [facility] cardiology    BPH (benign prostatic hyperplasia)     CAD (coronary artery disease)     Sep 2021- NSTEMI, subtotal LM occlusion, emergent CABG - LIMA-LAD, SVG-OM. Large dominant RCA with 40-50% narrowing    Diabetes (HCC)     Type 2, Oral meds, Average 160-180, Last A1C 8.2 on 5/24/23, denies hypo    GERD (gastroesophageal reflux disease)     OTC meds as needed    H/O echocardiogram     Echo 7/19/23 - EF 50-55%, mild LVH, moderate apical HK (small), mild MR, RVSP 33, mild LAE    Heart failure ([address])     Followed by [facility] cardiology    HTN (hypertension)     Managed with meds    Hypercholesteremia     Unilateral primary osteoarthritis, left knee 08/16/2022      Past Surgical History:   Procedure Laterality Date    CORONARY ARTERY BYPASS GRAFT  09/24/2021    Severe multivessel coronary artery disease with subtotal occlusion of the left main coronary artery    KNEE ARTHROSCOPY      to L    PTCA  2000    stents x 2    SHOULDER ARTHROSCOPY      to L     No family history on file.    Social History     Tobacco Use Smoking status: Never    Smokeless tobacco: Never   Substance Use Topics    Alcohol use: No       Prior to Admission medications    Medication Sig Start Date End Date Taking? Authorizing Provider   glimepiride (AMARYL) 4 MG tablet Take 1 tablet by mouth in the morning and at bedtime 5/24/23   Historical Provider, MD   tadalafil (CIALIS) 10 MG tablet Take 1 tablet by mouth at bedtime    Historical Provider, MD   ENTRESTO 49-51 MG per tablet TAKE ONE TABLET BY MOUTH TWICE A DAY 5/1/23   Jennifer Farley MD   rosuvastatin (CRESTOR) 20 MG tablet TAKE ONE TABLET BY MOUTH EVERY NIGHT 3/19/23   Jennifer Farley MD   XARELTO 20 MG TABS tablet TAKE ONE TABLET BY MOUTH ONE TIME DAILY WITH DINNER 2/14/23   Jennifer Farley MD   acetaminophen (TYLENOL) 325 MG tablet Take 2 tablets by mouth every 6 hours as needed 9/29/21   Ar Automatic Reconciliation   amLODIPine (NORVASC) 5 MG tablet Take 1 tablet by mouth daily 2/17/22   Ar Automatic Reconciliation   aspirin 81 MG EC tablet Take 1 tablet by mouth at bedtime    Ar Automatic Reconciliation   metFORMIN (GLUCOPHAGE) 850 MG tablet Take 1 tablet by mouth 2 times daily (with meals)    Ar Automatic Reconciliation   metoprolol succinate (TOPROL XL) 50 MG extended release tablet Take 1 tablet by mouth nightly 3/16/22   Ar Automatic Reconciliation   nitroGLYCERIN (NITROSTAT) 0.4 MG SL tablet Place 1 tablet under the tongue 9/29/21   Ar Automatic Reconciliation   spironolactone (ALDACTONE) 25 MG tablet Take 1 tablet by mouth at bedtime 2/17/22   Ar Automatic Reconciliation     No Known Allergies     REVIEW OF SYSTEMS:  CONSTITUTIONAL: Denies fever, decreased appetite, weight loss/gain, night sweats or fatigue. HEENT: Denies vision or hearing changes. denies glasses. denies hearing aids. CARDIAC: Denies CP, palpitations, rheumatic fever, murmur, peripheral edema, carotid artery disease or syncopal episodes.  RESPIRATORY: Denies dyspnea on exertion, asthma, COPD or

## 2023-09-01 NOTE — PROGRESS NOTES
Name: Yana Love  YOB: 1950  Gender: male  MRN: [id number]      Radiographs: An AP standing, flexion lateral, and sunrise view of the left knee was obtained  This demonstrated  Severe joint space narrowing of the medial compartment with bone-on-bone articulation, Severe bone-on-bone articulation of the lateral compartment, and Severe patellofemoral degenerative change with bone-on-bone articulation.     Radiographic impression: severe DJD left Knee    Jacques Nelson MD

## 2023-09-01 NOTE — PROGRESS NOTES
Name: Jana Pham  YOB: 1950  Gender: male  MRN: [id number]    Pre-Op     CC: LEFT KNEE PAIN       This patient comes in for pre-op exam prior to LEFT TKA. The patient has been cleared preoperatively. I counseled the patient once again about the risks of infection, DVT formation, expected time of hospitalization, anticipated recovery time as well as rehab needs and expectations for recovery. The patient would like to proceed and we will do so as planned. The patient was provided with pain medications as well as DVT prophylaxis to have on hand postoperatively at the time of discharge from hospital. All pertinent questions asked by the patient were answered.     DARYA Hutchinson

## 2023-09-04 ENCOUNTER — ANESTHESIA EVENT (OUTPATIENT)
Dept: SURGERY | Age: 73
End: 2023-09-04
Payer: MEDICARE

## 2023-09-05 ENCOUNTER — HOME HEALTH ADMISSION (OUTPATIENT)
Dept: HOME HEALTH SERVICES | Facility: HOME HEALTH | Age: 73
End: 2023-09-05
Payer: MEDICARE

## 2023-09-05 ENCOUNTER — HOSPITAL ENCOUNTER (OUTPATIENT)
Age: 73
Discharge: HOME HEALTH | End: 2023-09-06
Attending: ORTHOPAEDIC SURGERY | Admitting: ORTHOPAEDIC SURGERY
Payer: MEDICARE

## 2023-09-05 ENCOUNTER — ANESTHESIA (OUTPATIENT)
Dept: SURGERY | Age: 73
End: 2023-09-05
Payer: MEDICARE

## 2023-09-05 DIAGNOSIS — M17.12 PRIMARY OSTEOARTHRITIS OF LEFT KNEE: Primary | ICD-10-CM

## 2023-09-05 PROBLEM — I10 HTN (HYPERTENSION): Status: ACTIVE | Noted: 2021-09-24

## 2023-09-05 PROBLEM — E11.9 DM2 (DIABETES MELLITUS, TYPE 2) (HCC): Status: ACTIVE | Noted: 2021-09-24

## 2023-09-05 PROBLEM — I48.91 ATRIAL FIBRILLATION AND FLUTTER (HCC): Status: ACTIVE | Noted: 2021-12-04

## 2023-09-05 PROBLEM — I48.92 ATRIAL FIBRILLATION AND FLUTTER (HCC): Status: ACTIVE | Noted: 2021-12-04

## 2023-09-05 PROBLEM — I50.22 CHRONIC HFREF (HEART FAILURE WITH REDUCED EJECTION FRACTION) (HCC): Status: ACTIVE | Noted: 2022-03-15

## 2023-09-05 PROBLEM — I25.10 CAD (CORONARY ARTERY DISEASE): Status: ACTIVE | Noted: 2021-09-24

## 2023-09-05 LAB
APTT PPP: 30.7 SEC (ref 24.5–34.2)
GLUCOSE BLD STRIP.AUTO-MCNC: 196 MG/DL (ref 65–100)
GLUCOSE BLD STRIP.AUTO-MCNC: 316 MG/DL (ref 65–100)
INR BLD: 1 (ref 0.9–1.2)
PT BLD: 12.3 SECS (ref 9.6–11.6)
SERVICE CMNT-IMP: ABNORMAL
SERVICE CMNT-IMP: ABNORMAL

## 2023-09-05 PROCEDURE — 2500000003 HC RX 250 WO HCPCS: Performed by: NURSE ANESTHETIST, CERTIFIED REGISTERED

## 2023-09-05 PROCEDURE — 6360000002 HC RX W HCPCS: Performed by: PHYSICIAN ASSISTANT

## 2023-09-05 PROCEDURE — 2720000010 HC SURG SUPPLY STERILE: Performed by: ORTHOPAEDIC SURGERY

## 2023-09-05 PROCEDURE — 85610 PROTHROMBIN TIME: CPT

## 2023-09-05 PROCEDURE — 6360000002 HC RX W HCPCS: Performed by: ANESTHESIOLOGY

## 2023-09-05 PROCEDURE — 97161 PT EVAL LOW COMPLEX 20 MIN: CPT

## 2023-09-05 PROCEDURE — 94760 N-INVAS EAR/PLS OXIMETRY 1: CPT

## 2023-09-05 PROCEDURE — C1713 ANCHOR/SCREW BN/BN,TIS/BN: HCPCS | Performed by: ORTHOPAEDIC SURGERY

## 2023-09-05 PROCEDURE — 6370000000 HC RX 637 (ALT 250 FOR IP): Performed by: FAMILY MEDICINE

## 2023-09-05 PROCEDURE — 2709999900 HC NON-CHARGEABLE SUPPLY: Performed by: ORTHOPAEDIC SURGERY

## 2023-09-05 PROCEDURE — 3600000005 HC SURGERY LEVEL 5 BASE: Performed by: ORTHOPAEDIC SURGERY

## 2023-09-05 PROCEDURE — 3600000015 HC SURGERY LEVEL 5 ADDTL 15MIN: Performed by: ORTHOPAEDIC SURGERY

## 2023-09-05 PROCEDURE — 6370000000 HC RX 637 (ALT 250 FOR IP): Performed by: ANESTHESIOLOGY

## 2023-09-05 PROCEDURE — 6360000002 HC RX W HCPCS: Performed by: NURSE ANESTHETIST, CERTIFIED REGISTERED

## 2023-09-05 PROCEDURE — 20985 CPTR-ASST DIR MS PX: CPT | Performed by: ORTHOPAEDIC SURGERY

## 2023-09-05 PROCEDURE — 27447 TOTAL KNEE ARTHROPLASTY: CPT | Performed by: ORTHOPAEDIC SURGERY

## 2023-09-05 PROCEDURE — 3700000001 HC ADD 15 MINUTES (ANESTHESIA): Performed by: ORTHOPAEDIC SURGERY

## 2023-09-05 PROCEDURE — 2580000003 HC RX 258: Performed by: PHYSICIAN ASSISTANT

## 2023-09-05 PROCEDURE — 3700000000 HC ANESTHESIA ATTENDED CARE: Performed by: ORTHOPAEDIC SURGERY

## 2023-09-05 PROCEDURE — 97165 OT EVAL LOW COMPLEX 30 MIN: CPT

## 2023-09-05 PROCEDURE — 97535 SELF CARE MNGMENT TRAINING: CPT

## 2023-09-05 PROCEDURE — 2580000003 HC RX 258: Performed by: ORTHOPAEDIC SURGERY

## 2023-09-05 PROCEDURE — 7100000000 HC PACU RECOVERY - FIRST 15 MIN: Performed by: ORTHOPAEDIC SURGERY

## 2023-09-05 PROCEDURE — 6360000002 HC RX W HCPCS: Performed by: ORTHOPAEDIC SURGERY

## 2023-09-05 PROCEDURE — 6370000000 HC RX 637 (ALT 250 FOR IP): Performed by: PHYSICIAN ASSISTANT

## 2023-09-05 PROCEDURE — 97530 THERAPEUTIC ACTIVITIES: CPT

## 2023-09-05 PROCEDURE — 2500000003 HC RX 250 WO HCPCS: Performed by: ORTHOPAEDIC SURGERY

## 2023-09-05 PROCEDURE — 27447 TOTAL KNEE ARTHROPLASTY: CPT | Performed by: PHYSICIAN ASSISTANT

## 2023-09-05 PROCEDURE — 7100000001 HC PACU RECOVERY - ADDTL 15 MIN: Performed by: ORTHOPAEDIC SURGERY

## 2023-09-05 PROCEDURE — 94761 N-INVAS EAR/PLS OXIMETRY MLT: CPT

## 2023-09-05 PROCEDURE — 82962 GLUCOSE BLOOD TEST: CPT

## 2023-09-05 PROCEDURE — 2580000003 HC RX 258: Performed by: ANESTHESIOLOGY

## 2023-09-05 PROCEDURE — 64447 NJX AA&/STRD FEMORAL NRV IMG: CPT | Performed by: ANESTHESIOLOGY

## 2023-09-05 PROCEDURE — 85730 THROMBOPLASTIN TIME PARTIAL: CPT

## 2023-09-05 PROCEDURE — C1776 JOINT DEVICE (IMPLANTABLE): HCPCS | Performed by: ORTHOPAEDIC SURGERY

## 2023-09-05 DEVICE — INSERT TIB SZ 8 THK8MM KNEE POST STBL ROT PLATFRM ATTUNE: Type: IMPLANTABLE DEVICE | Site: KNEE | Status: FUNCTIONAL

## 2023-09-05 DEVICE — DEPUY CMW 2 FAST SET BONE CEMENT 20G: Type: IMPLANTABLE DEVICE | Site: KNEE | Status: FUNCTIONAL

## 2023-09-05 DEVICE — ATTUNE FEMORAL POROCOAT POSTERIOR STABILIZED SIZE 8 LEFT CEMENTLESS
Type: IMPLANTABLE DEVICE | Site: KNEE | Status: FUNCTIONAL
Brand: ATTUNE

## 2023-09-05 DEVICE — COMPONENT PAT DIA41MM KNEE POLY CEM MEDIALIZED ANAT ATTUNE: Type: IMPLANTABLE DEVICE | Site: KNEE | Status: FUNCTIONAL

## 2023-09-05 DEVICE — KNEE K2 TOT HEMI ADV CMTLS IMPL CAPPED SYNTHES: Type: IMPLANTABLE DEVICE | Status: FUNCTIONAL

## 2023-09-05 DEVICE — ATTUNE RP TIB BASE SZ 8 POR: Type: IMPLANTABLE DEVICE | Site: KNEE | Status: FUNCTIONAL

## 2023-09-05 RX ORDER — ONDANSETRON 2 MG/ML
4 INJECTION INTRAMUSCULAR; INTRAVENOUS EVERY 6 HOURS PRN
Status: DISCONTINUED | OUTPATIENT
Start: 2023-09-05 | End: 2023-09-06 | Stop reason: HOSPADM

## 2023-09-05 RX ORDER — ACETAMINOPHEN 500 MG
1000 TABLET ORAL ONCE
Status: DISCONTINUED | OUTPATIENT
Start: 2023-09-05 | End: 2023-09-05

## 2023-09-05 RX ORDER — FAMOTIDINE 20 MG/1
20 TABLET, FILM COATED ORAL ONCE
Status: COMPLETED | OUTPATIENT
Start: 2023-09-05 | End: 2023-09-05

## 2023-09-05 RX ORDER — KETOROLAC TROMETHAMINE 30 MG/ML
INJECTION, SOLUTION INTRAMUSCULAR; INTRAVENOUS PRN
Status: DISCONTINUED | OUTPATIENT
Start: 2023-09-05 | End: 2023-09-05 | Stop reason: ALTCHOICE

## 2023-09-05 RX ORDER — ROPIVACAINE HYDROCHLORIDE 2 MG/ML
INJECTION, SOLUTION EPIDURAL; INFILTRATION; PERINEURAL
Status: COMPLETED | OUTPATIENT
Start: 2023-09-05 | End: 2023-09-05

## 2023-09-05 RX ORDER — DIPHENHYDRAMINE HYDROCHLORIDE 50 MG/ML
12.5 INJECTION INTRAMUSCULAR; INTRAVENOUS
Status: DISCONTINUED | OUTPATIENT
Start: 2023-09-05 | End: 2023-09-05 | Stop reason: HOSPADM

## 2023-09-05 RX ORDER — PROMETHAZINE HYDROCHLORIDE 25 MG/1
25 TABLET ORAL EVERY 6 HOURS PRN
Status: DISCONTINUED | OUTPATIENT
Start: 2023-09-05 | End: 2023-09-06 | Stop reason: HOSPADM

## 2023-09-05 RX ORDER — OXYCODONE HYDROCHLORIDE 5 MG/1
10 TABLET ORAL PRN
Status: COMPLETED | OUTPATIENT
Start: 2023-09-05 | End: 2023-09-05

## 2023-09-05 RX ORDER — ACETAMINOPHEN 500 MG
1000 TABLET ORAL ONCE
Status: COMPLETED | OUTPATIENT
Start: 2023-09-05 | End: 2023-09-05

## 2023-09-05 RX ORDER — INSULIN LISPRO 100 [IU]/ML
0-4 INJECTION, SOLUTION INTRAVENOUS; SUBCUTANEOUS NIGHTLY
Status: DISCONTINUED | OUTPATIENT
Start: 2023-09-05 | End: 2023-09-06 | Stop reason: HOSPADM

## 2023-09-05 RX ORDER — FENTANYL CITRATE 50 UG/ML
100 INJECTION, SOLUTION INTRAMUSCULAR; INTRAVENOUS
Status: COMPLETED | OUTPATIENT
Start: 2023-09-05 | End: 2023-09-05

## 2023-09-05 RX ORDER — DEXAMETHASONE SODIUM PHOSPHATE 10 MG/ML
10 INJECTION INTRAMUSCULAR; INTRAVENOUS ONCE
Status: DISCONTINUED | OUTPATIENT
Start: 2023-09-06 | End: 2023-09-06 | Stop reason: HOSPADM

## 2023-09-05 RX ORDER — SODIUM CHLORIDE, SODIUM LACTATE, POTASSIUM CHLORIDE, CALCIUM CHLORIDE 600; 310; 30; 20 MG/100ML; MG/100ML; MG/100ML; MG/100ML
INJECTION, SOLUTION INTRAVENOUS CONTINUOUS
Status: DISCONTINUED | OUTPATIENT
Start: 2023-09-05 | End: 2023-09-05 | Stop reason: HOSPADM

## 2023-09-05 RX ORDER — INSULIN LISPRO 100 [IU]/ML
0-8 INJECTION, SOLUTION INTRAVENOUS; SUBCUTANEOUS
Status: DISCONTINUED | OUTPATIENT
Start: 2023-09-05 | End: 2023-09-06 | Stop reason: HOSPADM

## 2023-09-05 RX ORDER — GLIPIZIDE 5 MG/1
10 TABLET ORAL 2 TIMES DAILY
Status: DISCONTINUED | OUTPATIENT
Start: 2023-09-05 | End: 2023-09-06 | Stop reason: HOSPADM

## 2023-09-05 RX ORDER — TRANEXAMIC ACID 100 MG/ML
INJECTION, SOLUTION INTRAVENOUS PRN
Status: DISCONTINUED | OUTPATIENT
Start: 2023-09-05 | End: 2023-09-05 | Stop reason: SDUPTHER

## 2023-09-05 RX ORDER — ROPIVACAINE HYDROCHLORIDE 2 MG/ML
INJECTION, SOLUTION EPIDURAL; INFILTRATION; PERINEURAL PRN
Status: DISCONTINUED | OUTPATIENT
Start: 2023-09-05 | End: 2023-09-05 | Stop reason: ALTCHOICE

## 2023-09-05 RX ORDER — METHOCARBAMOL 750 MG/1
750 TABLET, FILM COATED ORAL 4 TIMES DAILY PRN
Status: DISCONTINUED | OUTPATIENT
Start: 2023-09-05 | End: 2023-09-06 | Stop reason: HOSPADM

## 2023-09-05 RX ORDER — OXYCODONE HYDROCHLORIDE 5 MG/1
5 TABLET ORAL PRN
Status: COMPLETED | OUTPATIENT
Start: 2023-09-05 | End: 2023-09-05

## 2023-09-05 RX ORDER — SODIUM CHLORIDE 0.9 % (FLUSH) 0.9 %
5-40 SYRINGE (ML) INJECTION EVERY 12 HOURS SCHEDULED
Status: DISCONTINUED | OUTPATIENT
Start: 2023-09-05 | End: 2023-09-05 | Stop reason: HOSPADM

## 2023-09-05 RX ORDER — HYDROMORPHONE HYDROCHLORIDE 1 MG/ML
0.5 INJECTION, SOLUTION INTRAMUSCULAR; INTRAVENOUS; SUBCUTANEOUS
Status: DISCONTINUED | OUTPATIENT
Start: 2023-09-05 | End: 2023-09-06 | Stop reason: HOSPADM

## 2023-09-05 RX ORDER — LIDOCAINE HYDROCHLORIDE 10 MG/ML
1 INJECTION, SOLUTION INFILTRATION; PERINEURAL
Status: DISCONTINUED | OUTPATIENT
Start: 2023-09-05 | End: 2023-09-05 | Stop reason: HOSPADM

## 2023-09-05 RX ORDER — DIPHENHYDRAMINE HCL 25 MG
25 CAPSULE ORAL EVERY 6 HOURS PRN
Status: DISCONTINUED | OUTPATIENT
Start: 2023-09-05 | End: 2023-09-06 | Stop reason: HOSPADM

## 2023-09-05 RX ORDER — ONDANSETRON 2 MG/ML
INJECTION INTRAMUSCULAR; INTRAVENOUS PRN
Status: DISCONTINUED | OUTPATIENT
Start: 2023-09-05 | End: 2023-09-05 | Stop reason: SDUPTHER

## 2023-09-05 RX ORDER — SENNA AND DOCUSATE SODIUM 50; 8.6 MG/1; MG/1
1 TABLET, FILM COATED ORAL 2 TIMES DAILY
Status: DISCONTINUED | OUTPATIENT
Start: 2023-09-05 | End: 2023-09-06 | Stop reason: HOSPADM

## 2023-09-05 RX ORDER — SODIUM CHLORIDE 9 MG/ML
INJECTION, SOLUTION INTRAVENOUS PRN
Status: DISCONTINUED | OUTPATIENT
Start: 2023-09-05 | End: 2023-09-06 | Stop reason: HOSPADM

## 2023-09-05 RX ORDER — EPHEDRINE SULFATE/0.9% NACL/PF 50 MG/5 ML
SYRINGE (ML) INTRAVENOUS PRN
Status: DISCONTINUED | OUTPATIENT
Start: 2023-09-05 | End: 2023-09-05 | Stop reason: SDUPTHER

## 2023-09-05 RX ORDER — SODIUM CHLORIDE 9 MG/ML
INJECTION, SOLUTION INTRAVENOUS CONTINUOUS
Status: DISCONTINUED | OUTPATIENT
Start: 2023-09-05 | End: 2023-09-06 | Stop reason: HOSPADM

## 2023-09-05 RX ORDER — ASPIRIN 81 MG/1
81 TABLET, CHEWABLE ORAL ONCE
Status: COMPLETED | OUTPATIENT
Start: 2023-09-05 | End: 2023-09-05

## 2023-09-05 RX ORDER — SPIRONOLACTONE 25 MG/1
25 TABLET ORAL NIGHTLY
Status: DISCONTINUED | OUTPATIENT
Start: 2023-09-05 | End: 2023-09-06 | Stop reason: HOSPADM

## 2023-09-05 RX ORDER — SODIUM CHLORIDE 0.9 % (FLUSH) 0.9 %
5-40 SYRINGE (ML) INJECTION PRN
Status: DISCONTINUED | OUTPATIENT
Start: 2023-09-05 | End: 2023-09-06 | Stop reason: HOSPADM

## 2023-09-05 RX ORDER — SODIUM CHLORIDE 9 MG/ML
INJECTION, SOLUTION INTRAVENOUS CONTINUOUS
Status: DISCONTINUED | OUTPATIENT
Start: 2023-09-05 | End: 2023-09-05 | Stop reason: HOSPADM

## 2023-09-05 RX ORDER — SODIUM CHLORIDE 0.9 % (FLUSH) 0.9 %
5-40 SYRINGE (ML) INJECTION EVERY 12 HOURS SCHEDULED
Status: DISCONTINUED | OUTPATIENT
Start: 2023-09-05 | End: 2023-09-06 | Stop reason: HOSPADM

## 2023-09-05 RX ORDER — DEXAMETHASONE SODIUM PHOSPHATE 10 MG/ML
INJECTION INTRAMUSCULAR; INTRAVENOUS PRN
Status: DISCONTINUED | OUTPATIENT
Start: 2023-09-05 | End: 2023-09-05 | Stop reason: SDUPTHER

## 2023-09-05 RX ORDER — ASPIRIN 81 MG/1
81 TABLET ORAL 2 TIMES DAILY
Status: DISCONTINUED | OUTPATIENT
Start: 2023-09-05 | End: 2023-09-06 | Stop reason: HOSPADM

## 2023-09-05 RX ORDER — HYDROMORPHONE HYDROCHLORIDE 1 MG/ML
1 INJECTION, SOLUTION INTRAMUSCULAR; INTRAVENOUS; SUBCUTANEOUS
Status: DISCONTINUED | OUTPATIENT
Start: 2023-09-05 | End: 2023-09-06 | Stop reason: HOSPADM

## 2023-09-05 RX ORDER — DIPHENHYDRAMINE HYDROCHLORIDE 50 MG/ML
25 INJECTION INTRAMUSCULAR; INTRAVENOUS EVERY 6 HOURS PRN
Status: DISCONTINUED | OUTPATIENT
Start: 2023-09-05 | End: 2023-09-06 | Stop reason: HOSPADM

## 2023-09-05 RX ORDER — ONDANSETRON 2 MG/ML
4 INJECTION INTRAMUSCULAR; INTRAVENOUS
Status: DISCONTINUED | OUTPATIENT
Start: 2023-09-05 | End: 2023-09-05 | Stop reason: HOSPADM

## 2023-09-05 RX ORDER — SODIUM CHLORIDE, SODIUM LACTATE, POTASSIUM CHLORIDE, CALCIUM CHLORIDE 600; 310; 30; 20 MG/100ML; MG/100ML; MG/100ML; MG/100ML
INJECTION, SOLUTION INTRAVENOUS CONTINUOUS
Status: DISCONTINUED | OUTPATIENT
Start: 2023-09-05 | End: 2023-09-06 | Stop reason: HOSPADM

## 2023-09-05 RX ORDER — PROPOFOL 10 MG/ML
INJECTION, EMULSION INTRAVENOUS CONTINUOUS PRN
Status: DISCONTINUED | OUTPATIENT
Start: 2023-09-05 | End: 2023-09-05 | Stop reason: SDUPTHER

## 2023-09-05 RX ORDER — OXYCODONE HYDROCHLORIDE 5 MG/1
10 TABLET ORAL EVERY 4 HOURS PRN
Status: DISCONTINUED | OUTPATIENT
Start: 2023-09-05 | End: 2023-09-06 | Stop reason: HOSPADM

## 2023-09-05 RX ORDER — SODIUM CHLORIDE 0.9 % (FLUSH) 0.9 %
5-40 SYRINGE (ML) INJECTION PRN
Status: DISCONTINUED | OUTPATIENT
Start: 2023-09-05 | End: 2023-09-05 | Stop reason: HOSPADM

## 2023-09-05 RX ORDER — AMLODIPINE BESYLATE 5 MG/1
5 TABLET ORAL DAILY
Status: DISCONTINUED | OUTPATIENT
Start: 2023-09-06 | End: 2023-09-06 | Stop reason: HOSPADM

## 2023-09-05 RX ORDER — NALOXONE HYDROCHLORIDE 0.4 MG/ML
0.4 INJECTION, SOLUTION INTRAMUSCULAR; INTRAVENOUS; SUBCUTANEOUS PRN
Status: DISCONTINUED | OUTPATIENT
Start: 2023-09-05 | End: 2023-09-06 | Stop reason: HOSPADM

## 2023-09-05 RX ORDER — ENOXAPARIN SODIUM 100 MG/ML
40 INJECTION SUBCUTANEOUS EVERY 24 HOURS
Status: DISCONTINUED | OUTPATIENT
Start: 2023-09-06 | End: 2023-09-06 | Stop reason: HOSPADM

## 2023-09-05 RX ORDER — OXYCODONE HYDROCHLORIDE 5 MG/1
5 TABLET ORAL EVERY 4 HOURS PRN
Status: DISCONTINUED | OUTPATIENT
Start: 2023-09-05 | End: 2023-09-06 | Stop reason: HOSPADM

## 2023-09-05 RX ORDER — SODIUM CHLORIDE 9 MG/ML
INJECTION, SOLUTION INTRAVENOUS PRN
Status: DISCONTINUED | OUTPATIENT
Start: 2023-09-05 | End: 2023-09-05 | Stop reason: HOSPADM

## 2023-09-05 RX ORDER — ACETAMINOPHEN 325 MG/1
650 TABLET ORAL EVERY 6 HOURS
Status: DISCONTINUED | OUTPATIENT
Start: 2023-09-05 | End: 2023-09-06 | Stop reason: HOSPADM

## 2023-09-05 RX ORDER — MIDAZOLAM HYDROCHLORIDE 2 MG/2ML
2 INJECTION, SOLUTION INTRAMUSCULAR; INTRAVENOUS
Status: COMPLETED | OUTPATIENT
Start: 2023-09-05 | End: 2023-09-05

## 2023-09-05 RX ORDER — DEXAMETHASONE SODIUM PHOSPHATE 10 MG/ML
INJECTION, SOLUTION INTRAMUSCULAR; INTRAVENOUS
Status: COMPLETED | OUTPATIENT
Start: 2023-09-05 | End: 2023-09-05

## 2023-09-05 RX ORDER — MAGNESIUM HYDROXIDE/ALUMINUM HYDROXICE/SIMETHICONE 120; 1200; 1200 MG/30ML; MG/30ML; MG/30ML
15 SUSPENSION ORAL EVERY 6 HOURS PRN
Status: DISCONTINUED | OUTPATIENT
Start: 2023-09-05 | End: 2023-09-06 | Stop reason: HOSPADM

## 2023-09-05 RX ORDER — MIDAZOLAM HYDROCHLORIDE 1 MG/ML
INJECTION INTRAMUSCULAR; INTRAVENOUS PRN
Status: DISCONTINUED | OUTPATIENT
Start: 2023-09-05 | End: 2023-09-05 | Stop reason: SDUPTHER

## 2023-09-05 RX ORDER — METOPROLOL SUCCINATE 50 MG/1
50 TABLET, EXTENDED RELEASE ORAL NIGHTLY
Status: DISCONTINUED | OUTPATIENT
Start: 2023-09-05 | End: 2023-09-06 | Stop reason: HOSPADM

## 2023-09-05 RX ADMIN — OXYCODONE HYDROCHLORIDE 10 MG: 5 TABLET ORAL at 21:30

## 2023-09-05 RX ADMIN — ACETAMINOPHEN 650 MG: 325 TABLET, FILM COATED ORAL at 13:30

## 2023-09-05 RX ADMIN — ASPIRIN 81 MG 81 MG: 81 TABLET ORAL at 08:38

## 2023-09-05 RX ADMIN — MIDAZOLAM 2 MG: 1 INJECTION INTRAMUSCULAR; INTRAVENOUS at 08:56

## 2023-09-05 RX ADMIN — MIDAZOLAM 2 MG: 1 INJECTION INTRAMUSCULAR; INTRAVENOUS at 09:41

## 2023-09-05 RX ADMIN — MEPIVACAINE HYDROCHLORIDE 60 MG: 20 INJECTION, SOLUTION EPIDURAL; INFILTRATION at 09:46

## 2023-09-05 RX ADMIN — OXYCODONE HYDROCHLORIDE 5 MG: 5 TABLET ORAL at 11:59

## 2023-09-05 RX ADMIN — DEXAMETHASONE SODIUM PHOSPHATE 5 MG: 10 INJECTION, SOLUTION INTRAMUSCULAR; INTRAVENOUS at 08:56

## 2023-09-05 RX ADMIN — ACETAMINOPHEN 1000 MG: 500 TABLET, FILM COATED ORAL at 07:23

## 2023-09-05 RX ADMIN — ACETAMINOPHEN 650 MG: 325 TABLET, FILM COATED ORAL at 17:34

## 2023-09-05 RX ADMIN — SODIUM CHLORIDE, PRESERVATIVE FREE 10 ML: 5 INJECTION INTRAVENOUS at 20:49

## 2023-09-05 RX ADMIN — Medication 2 G: at 09:35

## 2023-09-05 RX ADMIN — CEFAZOLIN 2000 MG: 10 INJECTION, POWDER, FOR SOLUTION INTRAVENOUS at 16:53

## 2023-09-05 RX ADMIN — INSULIN LISPRO 4 UNITS: 100 INJECTION, SOLUTION INTRAVENOUS; SUBCUTANEOUS at 20:52

## 2023-09-05 RX ADMIN — ROPIVACAINE HYDROCHLORIDE 20 ML: 2 INJECTION, SOLUTION EPIDURAL; INFILTRATION at 08:56

## 2023-09-05 RX ADMIN — OXYCODONE HYDROCHLORIDE 10 MG: 5 TABLET ORAL at 17:34

## 2023-09-05 RX ADMIN — SPIRONOLACTONE 25 MG: 25 TABLET ORAL at 20:46

## 2023-09-05 RX ADMIN — SENNOSIDES AND DOCUSATE SODIUM 1 TABLET: 50; 8.6 TABLET ORAL at 20:46

## 2023-09-05 RX ADMIN — PROPOFOL 50 MCG/KG/MIN: 10 INJECTION, EMULSION INTRAVENOUS at 09:50

## 2023-09-05 RX ADMIN — DEXAMETHASONE SODIUM PHOSPHATE 10 MG: 10 INJECTION INTRAMUSCULAR; INTRAVENOUS at 09:50

## 2023-09-05 RX ADMIN — ASPIRIN 81 MG: 81 TABLET, COATED ORAL at 20:46

## 2023-09-05 RX ADMIN — TRANEXAMIC ACID 1000 MG: 100 INJECTION, SOLUTION INTRAVENOUS at 09:48

## 2023-09-05 RX ADMIN — OXYCODONE HYDROCHLORIDE 10 MG: 5 TABLET ORAL at 13:30

## 2023-09-05 RX ADMIN — GLIPIZIDE 10 MG: 5 TABLET ORAL at 20:46

## 2023-09-05 RX ADMIN — Medication 10 MG: at 11:00

## 2023-09-05 RX ADMIN — FENTANYL CITRATE 25 MCG: 50 INJECTION INTRAMUSCULAR; INTRAVENOUS at 08:56

## 2023-09-05 RX ADMIN — METFORMIN HYDROCHLORIDE 850 MG: 850 TABLET ORAL at 16:51

## 2023-09-05 RX ADMIN — FAMOTIDINE 20 MG: 20 TABLET, FILM COATED ORAL at 07:23

## 2023-09-05 RX ADMIN — ONDANSETRON 4 MG: 2 INJECTION INTRAMUSCULAR; INTRAVENOUS at 09:50

## 2023-09-05 RX ADMIN — Medication 10 MG: at 10:22

## 2023-09-05 RX ADMIN — SODIUM CHLORIDE, POTASSIUM CHLORIDE, SODIUM LACTATE AND CALCIUM CHLORIDE: 600; 310; 30; 20 INJECTION, SOLUTION INTRAVENOUS at 07:50

## 2023-09-05 RX ADMIN — SACUBITRIL AND VALSARTAN 1 TABLET: 49; 51 TABLET, FILM COATED ORAL at 20:46

## 2023-09-05 RX ADMIN — METOPROLOL SUCCINATE 50 MG: 50 TABLET, EXTENDED RELEASE ORAL at 20:46

## 2023-09-05 ASSESSMENT — PAIN SCALES - GENERAL
PAINLEVEL_OUTOF10: 8
PAINLEVEL_OUTOF10: 7
PAINLEVEL_OUTOF10: 6
PAINLEVEL_OUTOF10: 4
PAINLEVEL_OUTOF10: 3
PAINLEVEL_OUTOF10: 8
PAINLEVEL_OUTOF10: 5
PAINLEVEL_OUTOF10: 0

## 2023-09-05 ASSESSMENT — PAIN DESCRIPTION - ORIENTATION
ORIENTATION: LEFT
ORIENTATION: LEFT

## 2023-09-05 ASSESSMENT — PAIN DESCRIPTION - LOCATION
LOCATION: KNEE

## 2023-09-05 ASSESSMENT — KOOS JR
RISING FROM SITTING: 2
KOOS JR TOTAL INTERVAL SCORE: 52.465
HOW SEVERE IS YOUR KNEE STIFFNESS AFTER FIRST WAKING IN MORNING: 2
BENDING TO THE FLOOR TO PICK UP OBJECT: 2
STRAIGHTENING KNEE FULLY: 2
TWISING OR PIVOTING ON KNEE: 2
STANDING UPRIGHT: 2
GOING UP OR DOWN STAIRS: 2

## 2023-09-05 ASSESSMENT — PAIN SCALES - WONG BAKER
WONGBAKER_NUMERICALRESPONSE: 0
WONGBAKER_NUMERICALRESPONSE: 2

## 2023-09-05 ASSESSMENT — PAIN - FUNCTIONAL ASSESSMENT: PAIN_FUNCTIONAL_ASSESSMENT: 0-10

## 2023-09-05 ASSESSMENT — PAIN DESCRIPTION - DESCRIPTORS
DESCRIPTORS: ACHING
DESCRIPTORS: ACHING

## 2023-09-05 NOTE — PERIOP NOTE
MD fink  at bedside with patient. Pt VSS stable. Pain and Nausea controlled at this time. Verbal sign out per MD when pacu care is completed. Plan of care continues.

## 2023-09-05 NOTE — TELEPHONE ENCOUNTER
He had surgery today and will be discharged tomorrow. They want to make sure his meds are sent to the [facility] at the [facility].

## 2023-09-05 NOTE — ANESTHESIA PRE PROCEDURE
Department of Anesthesiology  Preprocedure Note       Name:  Margot Mac   Age:  68 y.o.  :  1950                                          MRN:  [id number]         Date:  2023      Surgeon: Geovani Azevedo):  Ani Pedersen MD    Procedure: Procedure(s):  KNEE TOTAL ARTHROPLASTY ROBOTIC-LEFT VELYS    Medications prior to admission:   Prior to Admission medications    Medication Sig Start Date End Date Taking?  Authorizing Provider   glimepiride (AMARYL) 4 MG tablet Take 1 tablet by mouth in the morning and at bedtime 23   Historical Provider, MD   tadalafil (CIALIS) 10 MG tablet Take 1 tablet by mouth at bedtime    Historical Provider, MD   ENTRESTO 49-51 MG per tablet TAKE ONE TABLET BY MOUTH TWICE A DAY 23   Leatha Nunez MD   rosuvastatin (CRESTOR) 20 MG tablet TAKE ONE TABLET BY MOUTH EVERY NIGHT 3/19/23   Leatha Nunez MD   XARELTO 20 MG TABS tablet TAKE ONE TABLET BY MOUTH ONE TIME DAILY WITH DINNER 23   Leatha Nunez MD   acetaminophen (TYLENOL) 325 MG tablet Take 2 tablets by mouth every 6 hours as needed 21   Ar Automatic Reconciliation   amLODIPine (NORVASC) 5 MG tablet Take 1 tablet by mouth daily 22   Ar Automatic Reconciliation   aspirin 81 MG EC tablet Take 1 tablet by mouth at bedtime    Ar Automatic Reconciliation   metFORMIN (GLUCOPHAGE) 850 MG tablet Take 1 tablet by mouth 2 times daily (with meals)    Ar Automatic Reconciliation   metoprolol succinate (TOPROL XL) 50 MG extended release tablet Take 1 tablet by mouth nightly 3/16/22   Ar Automatic Reconciliation   nitroGLYCERIN (NITROSTAT) 0.4 MG SL tablet Place 1 tablet under the tongue  Patient not taking: Reported on 2023   Ar Automatic Reconciliation   spironolactone (ALDACTONE) 25 MG tablet Take 1 tablet by mouth at bedtime 22   Ar Automatic Reconciliation       Current medications:    Current Facility-Administered Medications   Medication Dose Route Frequency Alert and oriented, no focal deficits, no motor or sensory deficits.

## 2023-09-05 NOTE — PROGRESS NOTES
1256 Patient sitting up in bed eating lunch. Denies pain. He can wiggle toes. Incentive spirometer at bedside. Wife at bedside. Gripper socks on. Ice machine on knee. Call light within reach    [phone] Patient reports pain in knee. Med pass complete. OT at bedside.

## 2023-09-05 NOTE — ANESTHESIA PROCEDURE NOTES
Peripheral Block    Patient location during procedure: pre-op  Reason for block: post-op pain management and at surgeon's request  Start time: 9/5/2023 8:56 AM  End time: 9/5/2023 9:00 AM  Staffing  Performed: anesthesiologist   Anesthesiologist: Esteban Schilder, MD  Preanesthetic Checklist  Completed: patient identified, IV checked, site marked, risks and benefits discussed, surgical/procedural consents, equipment checked, pre-op evaluation, timeout performed, anesthesia consent given, oxygen available and monitors applied/VS acknowledged  Peripheral Block   Patient position: supine  Prep: ChloraPrep  Provider prep: mask  Patient monitoring: cardiac monitor, continuous pulse ox, frequent blood pressure checks, IV access, oxygen and responsive to questions  Block type: Femoral  Adductor canal  Laterality: left  Injection technique: single-shot  Guidance: ultrasound guided  Local infiltration: decadron  Infiltration strength: 1 %  Local infiltration: decadron  Dose: 0.5 mL    Needle   Needle type: insulated echogenic nerve stimulator needle   Needle gauge: 20 G  Needle localization: ultrasound guidance and nerve stimulator  Assessment   Injection assessment: negative aspiration for heme, no paresthesia on injection, local visualized surrounding nerve on ultrasound and no intravascular symptoms  Slow fractionated injection: yes  Hemodynamics: stable  Real-time US image taken/store: yes  Outcomes: uncomplicated    Additional Notes  No twitch before injection  Medications Administered  dexamethasone (DECADRON) (PF) 10 mg/mL injection - Other   5 mg - 9/5/2023 8:56:00 AM  ropivacaine (NAROPIN) injection 0.2% - Perineural   20 mL - 9/5/2023 8:56:00 AM

## 2023-09-05 NOTE — ANESTHESIA POSTPROCEDURE EVALUATION
Department of Anesthesiology  Postprocedure Note    Patient: Era Rice  MRN: [id number]  YOB: 1950  Date of evaluation: 9/5/2023      Procedure Summary     Date: 09/05/23 Room / Location: [facility] MAIN OR 08 / [facility] MAIN OR    Anesthesia Start: 8731 Anesthesia Stop: 9483    Procedure: KNEE TOTAL ARTHROPLASTY ROBOTIC-LEFT VELYS (Left: Knee) Diagnosis:       Degenerative arthritis of left knee      (Degenerative arthritis of left knee [M17.12])    Surgeons: Мария Franco MD Responsible Provider: Ponce Hudson MD    Anesthesia Type: spinal ASA Status: 3          Anesthesia Type: No value filed.     Kiki Phase I: Kiki Score: 10    Kiki Phase II:        Anesthesia Post Evaluation    Patient location during evaluation: PACU  Patient participation: complete - patient participated  Level of consciousness: awake and alert  Airway patency: patent  Nausea & Vomiting: no nausea  Cardiovascular status: hemodynamically stable  Respiratory status: acceptable  Hydration status: euvolemic  Pain management: adequate and satisfactory to patient

## 2023-09-05 NOTE — CONSULTS
[facility] Hospitalist Consult   Admit Date:  2023  6:16 AM   Name:  Rosette Officer   Age:  68 y.o. Sex:  male  :  1950   MRN:  [id number]   Room:      Presenting/Chief Complaint: No chief complaint on file. Reason(s) for Admission: Degenerative arthritis of left knee [M17.12]  Arthritis of left knee [M17.12]     Hospitalists consulted by Murel Cockayne, MD for: Postop medical management    History of Presenting Illness:   Rosette Officer is a 68 y.o. male with history of diabetes mellitus, hypertension, A-fib, CAD and CHF admitted by Ortho for elective left knee total arthroplasty. Patient postprocedure . Hospitalist consulted for postop management. Patient is seen at the bedside. Tolerated procedure well. Pain optimally controlled. Denies chest pain, palpitation, nausea, vomiting or shortness of breath. Assessment & Plan:     Left knee arthritis:  Status post left knee total arthroplasty   Management as per primary  Pain control  PT/OT    Hypertension/CAD/CHF:  A-fib:  Clinically euvolemic, heart rate controlled  Continue home meds: Aspirin, amlodipine, Toprol-XL, Entresto and spironolactone  Should resume Xarelto when okay with primary    Diabetes mellitus:  Continue metformin and glipizide  Sliding scale    PT/OT evals and PPD needed/ordered? Yes  Diet:  ADULT DIET; Regular  VTE prophylaxis: Defer to primary team  Code status: Full Code    Non-peripheral Lines and Tubes (if present):          Telemetry (if present):  Cardiac/Telemetry Monitor On: No        Hospital Problems:  Principal Problem:    Unilateral primary osteoarthritis, left knee  Active Problems:    DM2 (diabetes mellitus, type 2) (HCC)    HTN (hypertension)    Atrial fibrillation and flutter (HCC)    CAD (coronary artery disease)    Chronic HFrEF (heart failure with reduced ejection fraction) ([address])    Arthritis of left knee  Resolved Problems:    * No resolved hospital problems.  *        Past

## 2023-09-05 NOTE — CARE COORDINATION
Patient is a 68y.o. year old female admitted for Left TKA . Patient plans to return home on discharge. Order received to arrange home health. Patient without preference towards agency. Referral sent to [facility]. Patient denies any equipment needs as patient has a walker and bedside commode. Will follow until discharge. 09/05/23 9036   Service Assessment   Patient Orientation Alert and Oriented   Cognition Alert   Support Systems Spouse/Significant Other   Social/Functional History   Lives With Spouse   Type of [address] [place] [address] Road to Live on Main level with bedroom/bathroom   Home Access Stairs to enter with rails   Entrance Stairs - Number of Steps 4   Bathroom Shower/Tub Walk-in shower   Condition of Participation: Discharge Planning   Freedom of Choice list was provided with basic dialogue that supports the patient's individualized plan of care/goals, treatment preferences, and shares the quality data associated with the providers?   Yes

## 2023-09-05 NOTE — PERIOP NOTE
TRANSFER - OUT REPORT:    Verbal report given to zafar rhodes on Brandi Doctor  being transferred to [id number] for routine progression of patient care       Report consisted of patient's Situation, Background, Assessment and   Recommendations(SBAR). Information from the following report(s) Nurse Handoff Report, Adult Overview, Surgery Report, MAR, and Cardiac Rhythm SR  was reviewed with the receiving nurse. Lines:   Peripheral IV 09/05/23 Left;Posterior Hand (Active)   Site Assessment Clean, dry & intact 09/05/23 1140   Line Status Infusing 09/05/23 [address] [facility]  Floor Connections checked and tightened 09/05/23 1140   Phlebitis Assessment No symptoms 09/05/23 1140   Infiltration Assessment 0 09/05/23 1140   Alcohol Cap Used No 09/05/23 1140   Dressing Status Clean, dry & intact 09/05/23 1140   Dressing Type Transparent 09/05/23 1140        Opportunity for questions and clarification was provided.       Patient transported with:  O2 @ 0lpm

## 2023-09-05 NOTE — INTERVAL H&P NOTE
Update History & Physical    The patient's History and Physical of September 1, 23 was reviewed with the patient and I examined the patient. There was no change. The surgical site was confirmed by the patient and me. Plan: The risks, benefits, expected outcome, and alternative to the recommended procedure have been discussed with the patient. Patient understands and wants to proceed with the procedure.      Electronically signed by DARYA Lerma on 9/5/2023 at 9:08 AM

## 2023-09-05 NOTE — PROGRESS NOTES
09/05/23 1630   Oxygen Therapy/Pulse Ox   O2 Therapy Room air   O2 Device None (Room air)   Pulse 66   Respirations 18   SpO2 99 %   $Pulse Oximeter $Spot check (single)

## 2023-09-05 NOTE — ANESTHESIA PROCEDURE NOTES
Spinal Block    Patient location during procedure: OR  End time: 9/5/2023 9:48 AM  Reason for block: primary anesthetic and at surgeon's request  Staffing  Performed: anesthesiologist   Anesthesiologist: Omero James MD  Spinal Block  Patient position: sitting  Prep: ChloraPrep  Patient monitoring: cardiac monitor, continuous pulse ox, frequent blood pressure checks and oxygen  Location: L4/L5  Provider prep: mask and sterile gloves  Needle  Needle type: Pencan   Needle gauge: 25 G  Needle length: 3.5 in  Assessment  Events: cerebrospinal fluid  Swirl obtained: Yes  CSF: clear  Attempts: 1  Preanesthetic Checklist  Completed: patient identified, IV checked, site marked, risks and benefits discussed, surgical/procedural consents, equipment checked, pre-op evaluation, timeout performed, anesthesia consent given, oxygen available and monitors applied/VS acknowledged

## 2023-09-06 VITALS
TEMPERATURE: 97.6 F | WEIGHT: 257.3 LBS | SYSTOLIC BLOOD PRESSURE: 118 MMHG | DIASTOLIC BLOOD PRESSURE: 79 MMHG | OXYGEN SATURATION: 97 % | HEIGHT: 73 IN | RESPIRATION RATE: 18 BRPM | BODY MASS INDEX: 34.1 KG/M2 | HEART RATE: 89 BPM

## 2023-09-06 LAB
GLUCOSE BLD STRIP.AUTO-MCNC: 283 MG/DL (ref 65–100)
SERVICE CMNT-IMP: ABNORMAL

## 2023-09-06 PROCEDURE — 2580000003 HC RX 258: Performed by: PHYSICIAN ASSISTANT

## 2023-09-06 PROCEDURE — 97530 THERAPEUTIC ACTIVITIES: CPT

## 2023-09-06 PROCEDURE — 97535 SELF CARE MNGMENT TRAINING: CPT

## 2023-09-06 PROCEDURE — 6360000002 HC RX W HCPCS: Performed by: PHYSICIAN ASSISTANT

## 2023-09-06 PROCEDURE — 82962 GLUCOSE BLOOD TEST: CPT

## 2023-09-06 PROCEDURE — 6370000000 HC RX 637 (ALT 250 FOR IP): Performed by: PHYSICIAN ASSISTANT

## 2023-09-06 RX ORDER — OXYCODONE HYDROCHLORIDE 5 MG/1
5-10 TABLET ORAL EVERY 4 HOURS PRN
Qty: 60 TABLET | Refills: 0 | Status: SHIPPED | OUTPATIENT
Start: 2023-09-06 | End: 2023-09-13

## 2023-09-06 RX ORDER — ASPIRIN 81 MG/1
81 TABLET ORAL 2 TIMES DAILY
Qty: 4 TABLET | Refills: 0 | Status: SHIPPED | OUTPATIENT
Start: 2023-09-06 | End: 2023-09-08

## 2023-09-06 RX ORDER — ONDANSETRON 4 MG/1
4 TABLET, FILM COATED ORAL EVERY 6 HOURS PRN
Qty: 30 TABLET | Refills: 0 | Status: SHIPPED | OUTPATIENT
Start: 2023-09-06

## 2023-09-06 RX ORDER — METHOCARBAMOL 750 MG/1
TABLET, FILM COATED ORAL
Qty: 40 TABLET | Refills: 0 | Status: SHIPPED | OUTPATIENT
Start: 2023-09-06

## 2023-09-06 RX ADMIN — ASPIRIN 81 MG: 81 TABLET, COATED ORAL at 09:02

## 2023-09-06 RX ADMIN — OXYCODONE HYDROCHLORIDE 10 MG: 5 TABLET ORAL at 05:25

## 2023-09-06 RX ADMIN — SODIUM CHLORIDE, PRESERVATIVE FREE 10 ML: 5 INJECTION INTRAVENOUS at 09:10

## 2023-09-06 RX ADMIN — CEFAZOLIN 2000 MG: 10 INJECTION, POWDER, FOR SOLUTION INTRAVENOUS at 01:33

## 2023-09-06 RX ADMIN — GLIPIZIDE 10 MG: 5 TABLET ORAL at 09:02

## 2023-09-06 RX ADMIN — METFORMIN HYDROCHLORIDE 850 MG: 850 TABLET ORAL at 09:02

## 2023-09-06 RX ADMIN — OXYCODONE HYDROCHLORIDE 10 MG: 5 TABLET ORAL at 09:02

## 2023-09-06 RX ADMIN — OXYCODONE HYDROCHLORIDE 10 MG: 5 TABLET ORAL at 01:32

## 2023-09-06 RX ADMIN — AMLODIPINE BESYLATE 5 MG: 5 TABLET ORAL at 09:02

## 2023-09-06 RX ADMIN — ACETAMINOPHEN 650 MG: 325 TABLET, FILM COATED ORAL at 01:33

## 2023-09-06 RX ADMIN — ENOXAPARIN SODIUM 40 MG: 100 INJECTION SUBCUTANEOUS at 09:03

## 2023-09-06 RX ADMIN — SENNOSIDES AND DOCUSATE SODIUM 1 TABLET: 50; 8.6 TABLET ORAL at 09:02

## 2023-09-06 RX ADMIN — ACETAMINOPHEN 650 MG: 325 TABLET, FILM COATED ORAL at 05:25

## 2023-09-06 RX ADMIN — SACUBITRIL AND VALSARTAN 1 TABLET: 49; 51 TABLET, FILM COATED ORAL at 09:01

## 2023-09-06 ASSESSMENT — PAIN SCALES - GENERAL
PAINLEVEL_OUTOF10: 2
PAINLEVEL_OUTOF10: 8
PAINLEVEL_OUTOF10: 3
PAINLEVEL_OUTOF10: 3

## 2023-09-06 NOTE — DISCHARGE SUMMARY
[facility]  Total Joint Discharge Summary      Patient ID:  Aime Warren  [id number]  65 y.o.  1950    Admit date: 9/5/2023  Discharge date and time: 09/06/23   Admitting Physician: Renee Pinto MD  Surgeon: Same  Admission Diagnoses: Degenerative arthritis of left knee [M17.12]  Arthritis of left knee [M17.12]  Discharge Diagnoses: Principal Problem:    Unilateral primary osteoarthritis, left knee  Active Problems:    DM2 (diabetes mellitus, type 2) ([address]    HTN (hypertension)    Atrial fibrillation and flutter (HCC)    CAD (coronary artery disease)    Chronic HFrEF (heart failure with reduced ejection fraction) ([address])    Arthritis of left knee  Resolved Problems:    * No resolved hospital problems. *                              Perioperative Antibiotics: Ancef 1 to 3 g was given depending on patient's weight.  If allergic to Ancef or due to other indications, patient was given Vancomycin/Gent per protocol      Hospital Medications given:   amLODIPine, 5 mg, Daily  glipiZIDE, 10 mg, BID  metFORMIN, 850 mg, BID WC  metoprolol succinate, 50 mg, Nightly  spironolactone, 25 mg, Nightly  sacubitril-valsartan, 1 tablet, BID  sodium chloride flush, 5-40 mL, 2 times per day  acetaminophen, 650 mg, Q6H  sennosides-docusate sodium, 1 tablet, BID  aspirin, 81 mg, BID  dexamethasone, 10 mg, Once  enoxaparin, 40 mg, Q24H  insulin lispro, 0-8 Units, TID WC  insulin lispro, 0-4 Units, Nightly      lactated ringers IV soln, Last Rate: Stopped (09/05/23 1848)  sodium chloride, Last Rate: Stopped (09/05/23 1848)  sodium chloride      sodium chloride flush, 5-40 mL, PRN  sodium chloride, , PRN  oxyCODONE, 5 mg, Q4H PRN   Or  oxyCODONE, 10 mg, Q4H PRN  HYDROmorphone, 0.5 mg, Q3H PRN   Or  HYDROmorphone, 1 mg, Q3H PRN  promethazine, 25 mg, Q6H PRN   Or  ondansetron, 4 mg, Q6H PRN  aluminum & magnesium hydroxide-simethicone, 15 mL, Q6H PRN  diphenhydrAMINE, 25 mg, Q6H PRN   Or  diphenhydrAMINE, 25

## 2023-09-06 NOTE — PROGRESS NOTES
09/05/23 2129   Treatment   Treatment Type IS   Oxygen Therapy/Pulse Ox   O2 Therapy Room air   O2 Device None (Room air)   Pulse 74   Respirations 18   SpO2 95 %   Pulse Oximeter Device Mode Continuous   Pulse Oximeter Device Location Right;Finger   Pulse Oximeter Low SpO2 Alarm 889   Pulse Oximeter High SpO2 Alarm 100   $Pulse Oximeter $Spot check (multiple/continuous)   Incentive Spirometry Tx   Treatment Effort Assisted by RT   Achieved Volume (mL) 2000 mL     Patient placed on continuous sat monitor. Alarms set per protocol. (%). Patient working on IS achieving 2000 ml. Very good effort, technique.

## 2023-09-06 NOTE — DISCHARGE INSTRUCTIONS
[address] [place] [address]      Patient Discharge Instructions    Era Rice / [id number] : 1950    Admitted 2023 Discharged: 2023     IF YOU HAVE ANY PROBLEMS ONCE YOU ARE AT HOME CALL THE FOLLOWING NUMBERS:   Main office number: [phone]      Medications    The medications you are to continue on are listed on the medication reconciliation sheet. Narcotic pain medications as well as supplemental iron can cause constipation. If this occurs try stopping the narcotic pain medication and/or the iron. It is important that you take the medication exactly as they are prescribed. Medications which increase your risk of blood clots are listed to stop for 5 weeks after surgery as well as medications or supplements which increase your risk of bleeding complications. Keep your medication in the bottles provided by the pharmacist and keep a list of the medication names, dosages, and times to be taken in your wallet. Do not take other medications without consulting your doctor. Important Information    Do NOT smoke as this will greatly increase your risk of infection! Resume your prehospital diet. If you have excessive nausea or vomitting call your doctor's office     Leg swelling and warmth is normal for 6 months after surgery. If you experience swelling in your leg elevate you leg while laying down with your toes above your heart. If you have sudden onset severe swelling with leg pain call our office. Use Lucio Hose stockings until we see you in the office for your follow up appointment. The stitches deep inside take approximately 6 months to dissolve. There will be sharp shooting, stinging and burning pain. This is normal and will resolve between 3-6 months after surgery. Difficulty sleeping is normal following total Knee and Hip replacement. You may try melatonin, an over-the-counter sleep aid or benadryl to help with sleep.  Most patients will resume sleeping through the night If you take a blood thinner, be sure you get instructions about how to take your medicine safely. Blood thinners can cause serious bleeding problems. This medicine could be in pill form or as a shot (injection). If a shot is needed, your doctor will tell you how to do this. Be safe with medicines. Take pain medicines exactly as directed. If the doctor gave you a prescription medicine for pain, take it as prescribed. If you are not taking a prescription pain medicine, ask your doctor if you can take an over-the-counter medicine. Plan to take your pain medicine 30 minutes before exercises. It is easier to prevent pain before it starts than to stop it after it has started. If you think your pain medicine is making you sick to your stomach: Take your medicine after meals (unless your doctor has told you not to). Ask your doctor for a different pain medicine. If your doctor prescribed antibiotics, take them as directed. Do not stop taking them just because you feel better. You need to take the full course of antibiotics. Incision care    If your doctor told you how to care for your cut (incision), follow your doctor's instructions. You will have a dressing over the cut. A dressing helps the incision heal and protects it. Your doctor will tell you how to take care of this. If you did not get instructions, follow this general advice: If you have strips of tape on the cut the doctor made, leave the tape on for a week or until it falls off. If you have stitches or staples, your doctor will tell you when to come back to have them removed. If you have skin glue on the cut, leave it on until it falls off. Skin glue is also called skin adhesive or liquid stitches. Change the bandage every day. Wash the area daily with warm water, and pat it dry. Don't use hydrogen peroxide or alcohol. They can slow healing. You may cover the area with a gauze bandage if it oozes fluid or rubs against clothing.   You

## 2023-09-07 ENCOUNTER — HOME CARE VISIT (OUTPATIENT)
Dept: SCHEDULING | Facility: HOME HEALTH | Age: 73
End: 2023-09-07

## 2023-09-07 VITALS
TEMPERATURE: 99.3 F | OXYGEN SATURATION: 94 % | HEART RATE: 86 BPM | DIASTOLIC BLOOD PRESSURE: 60 MMHG | RESPIRATION RATE: 18 BRPM | SYSTOLIC BLOOD PRESSURE: 124 MMHG

## 2023-09-07 PROCEDURE — G0151 HHCP-SERV OF PT,EA 15 MIN: HCPCS

## 2023-09-07 PROCEDURE — 0221000100 HH NO PAY CLAIM PROCEDURE

## 2023-09-10 ASSESSMENT — ENCOUNTER SYMPTOMS
PAIN LOCATION - PAIN QUALITY: ACHE
DYSPNEA ACTIVITY LEVEL: AFTER AMBULATING MORE THAN 20 FT

## 2023-09-11 ENCOUNTER — HOME CARE VISIT (OUTPATIENT)
Dept: SCHEDULING | Facility: HOME HEALTH | Age: 73
End: 2023-09-11

## 2023-09-11 VITALS
RESPIRATION RATE: 18 BRPM | HEART RATE: 96 BPM | DIASTOLIC BLOOD PRESSURE: 78 MMHG | OXYGEN SATURATION: 96 % | TEMPERATURE: 98.1 F | SYSTOLIC BLOOD PRESSURE: 132 MMHG

## 2023-09-11 DIAGNOSIS — M17.12 PRIMARY OSTEOARTHRITIS OF LEFT KNEE: Primary | ICD-10-CM

## 2023-09-11 PROCEDURE — G0151 HHCP-SERV OF PT,EA 15 MIN: HCPCS

## 2023-09-11 RX ORDER — METHOCARBAMOL 750 MG/1
TABLET, FILM COATED ORAL
Qty: 40 TABLET | Refills: 0 | Status: SHIPPED | OUTPATIENT
Start: 2023-09-11

## 2023-09-11 RX ORDER — OXYCODONE HYDROCHLORIDE 5 MG/1
5-10 TABLET ORAL EVERY 4 HOURS PRN
Qty: 60 TABLET | Refills: 0 | Status: SHIPPED | OUTPATIENT
Start: 2023-09-11 | End: 2023-09-18

## 2023-09-11 ASSESSMENT — ENCOUNTER SYMPTOMS: PAIN LOCATION - PAIN QUALITY: SORE

## 2023-09-12 DIAGNOSIS — M17.12 PRIMARY OSTEOARTHRITIS OF LEFT KNEE: Primary | ICD-10-CM

## 2023-09-13 ENCOUNTER — HOME CARE VISIT (OUTPATIENT)
Dept: SCHEDULING | Facility: HOME HEALTH | Age: 73
End: 2023-09-13

## 2023-09-13 PROCEDURE — G0157 HHC PT ASSISTANT EA 15: HCPCS

## 2023-09-14 VITALS
SYSTOLIC BLOOD PRESSURE: 124 MMHG | HEART RATE: 76 BPM | DIASTOLIC BLOOD PRESSURE: 82 MMHG | TEMPERATURE: 97 F | OXYGEN SATURATION: 94 % | RESPIRATION RATE: 16 BRPM

## 2023-09-15 ENCOUNTER — TELEPHONE (OUTPATIENT)
Dept: ORTHOPEDIC SURGERY | Age: 73
End: 2023-09-15

## 2023-09-15 ENCOUNTER — HOME CARE VISIT (OUTPATIENT)
Dept: SCHEDULING | Facility: HOME HEALTH | Age: 73
End: 2023-09-15

## 2023-09-15 VITALS
OXYGEN SATURATION: 98 % | SYSTOLIC BLOOD PRESSURE: 122 MMHG | RESPIRATION RATE: 16 BRPM | DIASTOLIC BLOOD PRESSURE: 70 MMHG | TEMPERATURE: 97.2 F | HEART RATE: 81 BPM

## 2023-09-15 PROCEDURE — G0157 HHC PT ASSISTANT EA 15: HCPCS

## 2023-09-15 ASSESSMENT — ENCOUNTER SYMPTOMS: PAIN LOCATION - PAIN QUALITY: THROB.ACHES

## 2023-09-15 NOTE — CASE COMMUNICATION
contacted  office today during patients treatment regarding if patient can take supplement shakes. instructed doctor to contact patient directly. i left sunita with patient to ask about outpatient referral. and if he can get referral for nursing to teach him how to use glucometer.

## 2023-09-15 NOTE — TELEPHONE ENCOUNTER
He was asking her if he can take supplements that he takes daily. She uploaded pictures in [facility]. Please call the patient.

## 2023-09-18 ENCOUNTER — HOME CARE VISIT (OUTPATIENT)
Dept: SCHEDULING | Facility: HOME HEALTH | Age: 73
End: 2023-09-18
Payer: MEDICARE

## 2023-09-18 PROCEDURE — G2168 SVS BY PT IN HOME HEALTH: HCPCS

## 2023-09-19 VITALS
RESPIRATION RATE: 16 BRPM | OXYGEN SATURATION: 94 % | SYSTOLIC BLOOD PRESSURE: 122 MMHG | DIASTOLIC BLOOD PRESSURE: 70 MMHG | TEMPERATURE: 97 F | HEART RATE: 73 BPM

## 2023-09-19 NOTE — CASE COMMUNICATION
Exercise Oximetry    Patient Name:Elsa Harrison   MRN: [id number]   Olivia Scale)   Dyspnea (Olivia Scale)   Fatigue (Olivia Scale)   Fatigue (Olivia patient has glucometer and supplies but doesnot know how to use. will try to get order for nursing to teach him about diabetes.

## 2023-09-21 ENCOUNTER — HOME CARE VISIT (OUTPATIENT)
Dept: SCHEDULING | Facility: HOME HEALTH | Age: 73
End: 2023-09-21
Payer: MEDICARE

## 2023-09-21 VITALS
OXYGEN SATURATION: 98 % | HEART RATE: 80 BPM | SYSTOLIC BLOOD PRESSURE: 122 MMHG | RESPIRATION RATE: 15 BRPM | DIASTOLIC BLOOD PRESSURE: 82 MMHG | TEMPERATURE: 98.7 F

## 2023-09-21 PROCEDURE — G0157 HHC PT ASSISTANT EA 15: HCPCS

## 2023-09-21 PROCEDURE — G0299 HHS/HOSPICE OF RN EA 15 MIN: HCPCS

## 2023-09-22 VITALS
OXYGEN SATURATION: 95 % | SYSTOLIC BLOOD PRESSURE: 124 MMHG | HEART RATE: 76 BPM | RESPIRATION RATE: 16 BRPM | TEMPERATURE: 97.6 F | DIASTOLIC BLOOD PRESSURE: 64 MMHG

## 2023-09-26 ENCOUNTER — HOME CARE VISIT (OUTPATIENT)
Dept: HOME HEALTH SERVICES | Facility: HOME HEALTH | Age: 73
End: 2023-09-26
Payer: MEDICARE

## 2023-09-26 ENCOUNTER — HOME CARE VISIT (OUTPATIENT)
Dept: SCHEDULING | Facility: HOME HEALTH | Age: 73
End: 2023-09-26
Payer: MEDICARE

## 2023-09-26 PROCEDURE — G0157 HHC PT ASSISTANT EA 15: HCPCS

## 2023-09-27 VITALS
HEART RATE: 70 BPM | SYSTOLIC BLOOD PRESSURE: 128 MMHG | RESPIRATION RATE: 16 BRPM | TEMPERATURE: 97.4 F | DIASTOLIC BLOOD PRESSURE: 70 MMHG | OXYGEN SATURATION: 98 %

## 2023-09-28 ENCOUNTER — HOME CARE VISIT (OUTPATIENT)
Dept: SCHEDULING | Facility: HOME HEALTH | Age: 73
End: 2023-09-28
Payer: MEDICARE

## 2023-09-28 VITALS
OXYGEN SATURATION: 100 % | SYSTOLIC BLOOD PRESSURE: 118 MMHG | TEMPERATURE: 97.3 F | DIASTOLIC BLOOD PRESSURE: 68 MMHG | RESPIRATION RATE: 16 BRPM | HEART RATE: 70 BPM

## 2023-09-28 PROCEDURE — G0157 HHC PT ASSISTANT EA 15: HCPCS

## 2023-10-03 ENCOUNTER — HOME CARE VISIT (OUTPATIENT)
Dept: SCHEDULING | Facility: HOME HEALTH | Age: 73
End: 2023-10-03
Payer: MEDICARE

## 2023-10-03 VITALS
DIASTOLIC BLOOD PRESSURE: 72 MMHG | OXYGEN SATURATION: 95 % | TEMPERATURE: 98.3 F | RESPIRATION RATE: 16 BRPM | HEART RATE: 67 BPM | SYSTOLIC BLOOD PRESSURE: 122 MMHG

## 2023-10-03 PROCEDURE — G0151 HHCP-SERV OF PT,EA 15 MIN: HCPCS

## 2023-10-05 ENCOUNTER — HOSPITAL ENCOUNTER (OUTPATIENT)
Dept: PHYSICAL THERAPY | Age: 73
Setting detail: RECURRING SERIES
Discharge: HOME | End: 2023-10-08
Payer: MEDICARE

## 2023-10-05 DIAGNOSIS — Z96.652 PRESENCE OF LEFT ARTIFICIAL KNEE JOINT: ICD-10-CM

## 2023-10-05 DIAGNOSIS — R26.2 DIFFICULTY IN WALKING, NOT ELSEWHERE CLASSIFIED: Primary | ICD-10-CM

## 2023-10-05 DIAGNOSIS — M25.562 ACUTE PAIN OF LEFT KNEE: ICD-10-CM

## 2023-10-05 DIAGNOSIS — M25.662 STIFFNESS OF LEFT KNEE, NOT ELSEWHERE CLASSIFIED: ICD-10-CM

## 2023-10-05 PROCEDURE — 97110 THERAPEUTIC EXERCISES: CPT

## 2023-10-05 PROCEDURE — 97161 PT EVAL LOW COMPLEX 20 MIN: CPT

## 2023-10-09 ENCOUNTER — OFFICE VISIT (OUTPATIENT)
Dept: ORTHOPEDIC SURGERY | Age: 73
End: 2023-10-09

## 2023-10-09 ENCOUNTER — HOSPITAL ENCOUNTER (OUTPATIENT)
Dept: PHYSICAL THERAPY | Age: 73
Setting detail: RECURRING SERIES
Discharge: HOME | End: 2023-10-12
Payer: MEDICARE

## 2023-10-09 DIAGNOSIS — M17.12 PRIMARY OSTEOARTHRITIS OF LEFT KNEE: Primary | ICD-10-CM

## 2023-10-09 DIAGNOSIS — Z09 SURGERY FOLLOW-UP: ICD-10-CM

## 2023-10-09 DIAGNOSIS — Z96.652 TOTAL KNEE REPLACEMENT STATUS, LEFT: ICD-10-CM

## 2023-10-09 PROCEDURE — 97140 MANUAL THERAPY 1/> REGIONS: CPT

## 2023-10-09 PROCEDURE — 97110 THERAPEUTIC EXERCISES: CPT

## 2023-10-09 NOTE — PROGRESS NOTES
Brandon Bower  : 1950  Primary: Medicare Part A And B (Medicare)  Secondary: [address] @ [address] [place] [address] Yesika [facility] [address]  Phone: [phone]  Fax: [phone] Plan Frequency: 1-2x/week for 6 weeks    Plan of Care/Certification Expiration Date: 23      >PT Visit Info:  Plan Frequency: 1-2x/week for 6 weeks  Plan of Care/Certification Expiration Date: 23  Total # of Visits to Date: 1      Visit Count:  2    OUTPATIENT PHYSICAL THERAPY: Treatment Note 10/9/2023       Episode  }Appt Desk             Treatment Diagnosis:        Medical/Referring Diagnosis:  Status post left knee replacement [T48.890]  Referring Physician:  Alma Rogers MD MD Orders:  PT Eval and Treat   Date of Onset:  Onset Date: 23     Allergies:   Patient has no known allergies. Restrictions/Precautions:  Restrictions/Precautions: Weight Bearing  Left Lower Extremity Weight Bearing: Weight Bearing As Tolerated     Interventions Planned (Treatment may consist of any combination of the following):    Current Treatment Recommendations: Strengthening; ROM; Manual; Gait training; Modalities; Home exercise program     >Subjective Comments: Patient feels his knee is doing well, no significant pain. Sees Dr. Flor for followup. >Initial:     0 /10>Post Session:       0 /10  Medications Last Reviewed:  10/9/2023  Updated Objective Findings:  None Today  Treatment   THERAPEUTIC EXERCISE: (30 minutes):    Exercises to improve mobility and strength. Required minimal verbal cues to promote proper body alignment. Date:  10/9/23 Date:   Date:     Activity/Exercise Parameters Parameters Parameters   Recumbent bike Level 1 x 5 minutes     Quad sets Hold 3 x 10     SLR Slow x 10     Bridging  X 10 with ball     Seated hamstring stretch Hold 20 x 2     Seated calf stretch with strap Hold 20 x 2             MANUAL THERAPY: (10 minutes):    Soft tissue

## 2023-10-09 NOTE — PROGRESS NOTES
Name: Margot Mac  YOB: 1950  Gender: male  MRN: [id number]      Left Post-Op TKA 4 week follow up    This patient returns now four week status post s/p TKA. Things have gone reasonably well with therapy and the patient is now weaning from the cane. The pain level has improved. There has been no significant problem since the patient was last seen. On exam, the incision is healing approriately. ROM is 5 to 115. The patient has minimal antalgia in stance and good alignment in stance. Radiographs are obtained. Standing AP, flexion lateral and sunrise views of the Left knee demonstrates well positioned TKA with good bone implant interfaces. No significant abnormalities are seen. RADIOGRAPHIC IMPRESSION: Stable LeftTKA. CLINICAL IMPRESSION AND PLAN: Now four weeks s/p TKA . The patient is doing reasonably well. I have made recommendations about activity. We will ask the patient to continue to wean from the cane. Recommendations for further therapy include Continue outpatient physical therapy as scheduled. The patient will follow back up in in 4-5 months.       Work/Activity Restrictions:NOT Cadence Kennedy MD

## 2023-10-09 NOTE — PROGRESS NOTES
Richie Meraz  : 1950  Primary: Medicare Part A And B (Medicare)  Secondary: [address] @ [address] [place] [address] Yesika [place] [address]  Phone: [phone]  Fax: [phone] Plan Frequency: 1-2x/week for 6 weeks    Plan of Care/Certification Expiration Date: 23      >PT Visit Info:  Plan Frequency: 1-2x/week for 6 weeks  Plan of Care/Certification Expiration Date: 23  Total # of Visits to Date: 1      Visit Count:  1    OUTPATIENT PHYSICAL THERAPY: Treatment Note 10/5/2023       Episode  }Appt Desk             Treatment Diagnosis:    Visit Diagnoses         Codes    Difficulty in walking, not elsewhere classified    -  Primary R26.2    Acute pain of left knee     M25.562    Stiffness of left knee, not elsewhere classified     M25.662    Presence of left artificial knee joint     Z96.652          Medical/Referring Diagnosis:  Status post left knee replacement [F50.681]  Referring Physician:  Jonatan Fisher MD MD Orders:  PT Eval and Treat   Date of Onset:  Onset Date: 23     Allergies:   Patient has no known allergies. Restrictions/Precautions:  Restrictions/Precautions: Weight Bearing  Left Lower Extremity Weight Bearing: Weight Bearing As Tolerated     Interventions Planned (Treatment may consist of any combination of the following):    Current Treatment Recommendations: Strengthening; ROM; Manual; Gait training; Modalities; Home exercise program     >Subjective Comments:see eval     >Initial:     0 /10>Post Session:       0 /10  Medications Last Reviewed:  10/5/2023  Updated Objective Findings:  See Evaluation Note from today  Treatment   THERAPEUTIC EXERCISE: (15 minutes):    Exercises to improve mobility and strength. Required minimal verbal cues to promote proper body alignment. Pt supine and knee flexion and knee extension ROM. Reviewed HEP with home health exercises and added sitting knee flexion ROM exercise.

## 2023-10-09 NOTE — THERAPY EVALUATION
Severiano Maizes  : 1950  Primary: Medicare Part A And B (Medicare)  Secondary: [address] @ [address]  [address] [place] [address]  Phone: [phone]  Fax: [phone] Plan Frequency: 1-2x/week for 6 weeks  Plan of Care/Certification Expiration Date: 23      PT Visit Info:  Plan Frequency: 1-2x/week for 6 weeks  Plan of Care/Certification Expiration Date: 23  Total # of Visits to Date: 1      Visit Count:  1                OUTPATIENT PHYSICAL THERAPY:             Initial Assessment 10/5/2023               Episode (s/p L TKA) Appt Desk         Treatment Diagnosis:    Visit Diagnoses         Codes    Difficulty in walking, not elsewhere classified    -  Primary R26.2    Acute pain of left knee     M25.562    Stiffness of left knee, not elsewhere classified     M25.662    Presence of left artificial knee joint     Z96.652          Medical/Referring Diagnosis:  Status post left knee replacement [Q17.419]  Referring Physician:  Kaya Mckenzie MD MD Orders:  PT Eval and Treat   Return MD Appt:  10-9-23  Date of Onset:  Onset Date: 23      Allergies:  Patient has no known allergies. Restrictions/Precautions:      none     Medications Last Reviewed:  10/5/2023     SUBJECTIVE   History of Injury/Illness (Reason for Referral):  Pt reports left knee pain that continued to get worse. He had a Left TKA on 23. He stayed 1 night in the hospital. He had home health therapy for a few weeks. He would like to return to \"all things\".   Patient Stated Goal(s):  \"all things\"  Initial:     0 10 Post Session:     0 10  Past Medical History/Comorbidities:    [facility]  has a past medical history of Acute combined systolic and diastolic ACC/AHA stage C congestive heart failure ([address]), Atrial fibrillation and flutter ([address]), BPH (benign prostatic hyperplasia), CAD (coronary artery disease), Diabetes ([address]), GERD (gastroesophageal

## 2023-10-11 ENCOUNTER — HOSPITAL ENCOUNTER (OUTPATIENT)
Dept: PHYSICAL THERAPY | Age: 73
Setting detail: RECURRING SERIES
Discharge: HOME | End: 2023-10-14
Payer: MEDICARE

## 2023-10-11 PROCEDURE — 97110 THERAPEUTIC EXERCISES: CPT

## 2023-10-11 PROCEDURE — 97140 MANUAL THERAPY 1/> REGIONS: CPT

## 2023-10-11 NOTE — PROGRESS NOTES
Standing at incline and lunge stretch 3x20 seconds each    Standing heel/toe rocks  x30    SLS at rail  Hold 5 x 4 each    Standing TKE with resistance  Red x 20 hold 3 each    Minisquats at rail  X 10      MANUAL THERAPY: (15 minutes): Soft tissue mobilization was utilized and necessary because of the patient's restricted joint motion and restricted motion of soft tissue. Patient received gentle STM to quad in supine, patellar mobilizations and scar tissue mobilization  Patient received PROM and gentle stretches to L knee in supine (flexion and extension)    Treatment/Session Summary:    >Treatment Assessment: Patient did very well with introduction of standing, balance and stability exercises today with no pain reported after today's session. Progressing excellently with ROM, strength and functional activities. Communication/Consultation:  None today  Equipment provided today:  HEP access code: [email or web address]  Recommendations/Intent for next treatment session: Next visit will focus on L knee ROM and LE strength.     >Total Treatment Billable Duration:  55 minutes  Time In: 1020  Time Out: [address], PTA       Charge Capture  }Post Session Pain  PT Visit Info  [facility] Portal  MD Guidelines  Scanned Media  Benefits  MyChart    Future Appointments   Date Time Provider [address]   10/17/2023  9:15 AM [facility]   10/19/2023 10:00 AM [facility]   10/23/2023  9:15 AM [facility]   10/25/2023 10:30 AM Yolanda, [address] [place] [address], PT [facility]   1/29/2024  2:00 PM Wei Tena MD [facility]

## 2023-10-17 ENCOUNTER — HOSPITAL ENCOUNTER (OUTPATIENT)
Dept: PHYSICAL THERAPY | Age: 73
Setting detail: RECURRING SERIES
Discharge: HOME | End: 2023-10-20
Payer: MEDICARE

## 2023-10-17 PROCEDURE — 97110 THERAPEUTIC EXERCISES: CPT

## 2023-10-17 NOTE — PROGRESS NOTES
Bubbakristopher List  : 1950  Primary: Medicare Part A And B (Medicare)  Secondary: [address] @ [address] [place] [address] Yesika [facility] [address]  Phone: [phone]  Fax: [phone] Plan Frequency: 1-2x/week for 6 weeks    Plan of Care/Certification Expiration Date: 23      >PT Visit Info:  Plan Frequency: 1-2x/week for 6 weeks  Plan of Care/Certification Expiration Date: 23  Total # of Visits to Date: 1      Visit Count:  4    OUTPATIENT PHYSICAL THERAPY: Treatment Note 10/17/2023       Episode  }Appt Desk             Treatment Diagnosis:        Medical/Referring Diagnosis:  Status post left knee replacement [Z91.091]  Referring Physician:  Pio Dougherty MD MD Orders:  PT Eval and Treat   Date of Onset:  Onset Date: 23     Allergies:   Patient has no known allergies. Restrictions/Precautions:  Restrictions/Precautions: Weight Bearing  Left Lower Extremity Weight Bearing: Weight Bearing As Tolerated     Interventions Planned (Treatment may consist of any combination of the following):    Current Treatment Recommendations: Strengthening; ROM; Manual; Gait training; Modalities; Home exercise program     >Subjective Comments: Patient feels his knee continues to do well, no problems. He would like to try some of the machines in the gym to see what he can do. >Initial:     0 /10>Post Session:       0 /10  Medications Last Reviewed:  10/17/2023  Updated Objective Findings:  None Today  Treatment   THERAPEUTIC EXERCISE: (40 minutes):    Exercises to improve mobility and strength. Required minimal verbal cues to promote proper body alignment.      Date:  10/9/23 Date:  10/11/23 Date:  10/17/23   Activity/Exercise Parameters Parameters Parameters   Recumbent bike Level 1 x 5 minutes Level 2 x 6 minutes Level 4 x 8 minutes   Quad sets Hold 3 x 10     SLR Slow x 10     Bridging  X 10 with ball     Seated hamstring stretch Hold 20 x

## 2023-10-19 ENCOUNTER — HOSPITAL ENCOUNTER (OUTPATIENT)
Dept: PHYSICAL THERAPY | Age: 73
Setting detail: RECURRING SERIES
Discharge: HOME | End: 2023-10-22
Payer: MEDICARE

## 2023-10-19 PROCEDURE — 97140 MANUAL THERAPY 1/> REGIONS: CPT

## 2023-10-19 PROCEDURE — 97110 THERAPEUTIC EXERCISES: CPT

## 2023-10-19 NOTE — PROGRESS NOTES
Karuna Dodson  : 1950  Primary: Medicare Part A And B (Medicare)  Secondary: [address] @ [address] [place] [address] Yesika [facility] [address]  Phone: [phone]  Fax: [phone] Plan Frequency: 1-2x/week for 6 weeks    Plan of Care/Certification Expiration Date: 23      >PT Visit Info:  Plan Frequency: 1-2x/week for 6 weeks  Plan of Care/Certification Expiration Date: 23  Total # of Visits to Date: 1      Visit Count:  5    OUTPATIENT PHYSICAL THERAPY: Treatment Note 10/19/2023       Episode  }Appt Desk             Treatment Diagnosis:        Medical/Referring Diagnosis:  Status post left knee replacement [A55.815]  Referring Physician:  Olivia Mclaughlin MD MD Orders:  PT Eval and Treat   Date of Onset:  Onset Date: 23     Allergies:   Patient has no known allergies. Restrictions/Precautions:  Restrictions/Precautions: Weight Bearing  Left Lower Extremity Weight Bearing: Weight Bearing As Tolerated     Interventions Planned (Treatment may consist of any combination of the following):    Current Treatment Recommendations: Strengthening; ROM; Manual; Gait training; Modalities; Home exercise program     >Subjective Comments: Patient reports his knee continues to feel \"wonderful\" and he had no soreness or pain after gym exercises last session. >Initial:     0 /10>Post Session:       0 /10  Medications Last Reviewed:  10/19/2023  Updated Objective Findings:  None Today  Treatment   THERAPEUTIC EXERCISE: (30 minutes):    Exercises to improve mobility and strength. Required minimal verbal cues to promote proper body alignment.      Date:  10/9/23 Date:  10/11/23 Date:  10/17/23 Date:  10/19/23   Activity/Exercise Parameters Parameters Parameters    Recumbent bike Level 1 x 5 minutes Level 2 x 6 minutes Level 4 x 8 minutes Airdyne  6 minutes   Quad sets Hold 3 x 10      SLR Slow x 10      Bridging  X 10 with ball      Seated

## 2023-10-23 ENCOUNTER — HOSPITAL ENCOUNTER (OUTPATIENT)
Dept: PHYSICAL THERAPY | Age: 73
Setting detail: RECURRING SERIES
Discharge: HOME | End: 2023-10-26
Payer: MEDICARE

## 2023-10-23 PROCEDURE — 97110 THERAPEUTIC EXERCISES: CPT

## 2023-10-23 NOTE — PROGRESS NOTES
Seated hamstring stretch Hold 20 x 2 Hold 20 x 3 each      Seated calf stretch with strap Hold 20 x 2 Standing at incline and lunge stretch 3x20 seconds each  Standing at incline and lunge stretch 3x20 seconds each Incline [id number] each   Standing heel/toe rocks  x30  On foam pad  x30    SLS at rail  Hold 5 x 4 each  On foam pad hold 20 x 3 light UE support    Standing TKE with resistance  Red x 20 hold 3 each      Minisquats at rail  X 10      Hamstring curl machine   30# 3x10  30#/35#/40# x 10 each slow   Knee extension machine   15#, 20#, 25# x 10 each  25#/35#/45#  X10 each slow   Leg press machine   25# x10  40# 2x10  45# x 10  60# 2x10   Hip abduction machine   60#/80#/100# x10 each  80#/90#/100# x10 each slow   Hip adduction machine   50#/60#/70# x10 each  70#  3x10 slow   Calf raise machine     60# x 10  120# 2x10 slow   Vector kicks 3 planes    X10 each    Steps (forward and lateral)    8 inch step x 10 each        Treatment/Session Summary:    >Treatment Assessment: Patient did well today with increased resistance on machines and focusing on slow repetitions with eccentric control. No pain after session. Communication/Consultation:  None today  Equipment provided today:  HEP access code: [email or web address]  Recommendations/Intent for next treatment session: Next visit will focus on L knee ROM and LE strength.     >Total Treatment Billable Duration:  30 minutes  Time In: 0930  Time Out: RICH Mackay       Charge Capture  }Post Session Pain  PT Visit Info  [facility] Portal  MD Guidelines  Scanned Media  Benefits  MyChart    Future Appointments   Date Time Provider [address]   10/25/2023 10:30 AM [facility]   1/29/2024  2:00 PM Jeison Tena MD [facility]

## 2023-10-25 ENCOUNTER — APPOINTMENT (OUTPATIENT)
Dept: PHYSICAL THERAPY | Age: 73
End: 2023-10-25
Payer: MEDICARE

## 2023-10-31 ENCOUNTER — HOSPITAL ENCOUNTER (OUTPATIENT)
Dept: PHYSICAL THERAPY | Age: 73
Setting detail: RECURRING SERIES
Discharge: HOME | End: 2023-11-03
Payer: MEDICARE

## 2023-10-31 PROCEDURE — 97110 THERAPEUTIC EXERCISES: CPT

## 2023-10-31 NOTE — PROGRESS NOTES
Bree Dogn  : 1950  Primary: Medicare Part A And B (Medicare)  Secondary: [address] @ [address] [place] [address] Yesika [facility] [address]  Phone: [phone]  Fax: [phone] Plan Frequency: 1-2x/week for 6 weeks    Plan of Care/Certification Expiration Date: 23      >PT Visit Info:  Plan Frequency: 1-2x/week for 6 weeks  Plan of Care/Certification Expiration Date: 23  Total # of Visits to Date: 1      Visit Count:  7    OUTPATIENT PHYSICAL THERAPY: Treatment Note 10/31/2023       Episode  }Appt Desk             Treatment Diagnosis:        Medical/Referring Diagnosis:  Status post left knee replacement [J36.535]  Referring Physician:  Colen Habermann, MD MD Orders:  PT Eval and Treat   Date of Onset:  Onset Date: 23     Allergies:   Patient has no known allergies. Restrictions/Precautions:  Restrictions/Precautions: Weight Bearing  Left Lower Extremity Weight Bearing: Weight Bearing As Tolerated     Interventions Planned (Treatment may consist of any combination of the following):    Current Treatment Recommendations: Strengthening; ROM; Manual; Gait training; Modalities; Home exercise program     >Subjective Comments: Patient reports his knee feels good. >Initial:     0 /10>Post Session:       0 /10  Medications Last Reviewed:  10/31/2023  Updated Objective Findings:  Knee:  AROM Knee (Degrees)  L Knee Flexion (0-145): 115  L Knee Extension (0): 0    Treatment   THERAPEUTIC EXERCISE: (30 minutes):    Exercises to improve mobility and strength. Required minimal verbal cues to promote proper body alignment.      Date:  10/9/23 Date:  10/11/23 Date:  10/17/23 Date:  10/19/23 Date:  10/23/23 Date  10-31-23   Activity/Exercise Parameters Parameters Parameters      Recumbent bike Level 1 x 5 minutes Level 2 x 6 minutes Level 4 x 8 minutes Airdyne  6 minutes     Quad sets Hold 3 x 10        SLR Slow x 10        Bridging  X 10

## 2023-10-31 NOTE — THERAPY DISCHARGE
Conception Adjutant  : 1950  Primary: Medicare Part A And B (Medicare)  Secondary: [address] @ [address] [address] [place] [address]  Phone: [phone]  Fax: [phone]                    OUTPATIENT PHYSICAL THERAPY:             Discharge Summary 10/31/2023               Episode (s/p L TKA) Appt Desk         Treatment Diagnosis:        Medical/Referring Diagnosis:  Status post left knee replacement [L68.278]  Referring Physician:  Gwyn Strickland MD MD Orders:  PT Eval and Treat   Return MD Appt:  10-9-23  Date of Onset:  Onset Date: 23      Allergies:  Patient has no known allergies. OBJECTIVE     Palpation: no tenderness around incision    Knee:  AROM Knee (Degrees)  L Knee Flexion (0-145): 115  L Knee Extension (0): 0    ASSESSMENT   Conception Adjutant has been seen in physical therapy from 10-5-23 to 10-31-23 for 7 visits. He has progressed with L knee ROM. He has met short term goals and 3/4 long term goals. He is independent with HEP and ready to continue exercises on his own at the gym. Thank you for this referral.      PLAN   Discharge patient from physical therapy. Goals: (Goals have been discussed and agreed upon with patient.)  Short-Term Functional Goals: Time Frame: 3 weeks  Pt will be independent with HEP. GOAL MET  Pt will improve L knee ROM to 0-115 for improved mobility. GOAL MET  Discharge Goals: Time Frame: 6 weeks  Pt will improve L knee ROM to 0-120 for improved mobility. Progressed towards  Pt will increase L LE strength to 5/5 for daily activities. GOAL MET  Pt will negotiate 1 flight of stairs with step over step ascending and descending with good control. GOAL MET  Pt will report pain 0/10 with return to daily activities. GOAL MET         Outcome Measure:    Tool Used: Knee injury and Osteoarthritis Outcome Score for Joint Replacement (KOOS, JR)  Score:  Initial: 10 (Interval:

## 2023-11-14 ENCOUNTER — TELEPHONE (OUTPATIENT)
Age: 73
End: 2023-11-14

## 2023-11-14 NOTE — TELEPHONE ENCOUNTER
Requested Prescriptions     Pending Prescriptions Disp Refills    sacubitril-valsartan (ENTRESTO) 49-51 MG per tablet 180 tablet 3     Sig: Take 1 tablet by mouth 2 times daily

## 2023-11-14 NOTE — TELEPHONE ENCOUNTER
MEDICATION REFILL REQUEST      Name of Medication:  Entresto  Dose:  49-51 mg  Frequency:  BID  Quantity:  180  Days' supply:  90      Pharmacy Name/Location:  [phone]  Pt has been with out meds for 3 days and needs this called in asap please

## 2023-11-14 NOTE — TELEPHONE ENCOUNTER
MEDICATION REFILL REQUEST      Name of Medication:  Entresto   Dose:  49-51  Frequency:  twice   a day   Quantity:    Days' supply:  90      Pharmacy Name/Location:  [facility] [address] [facility] / Please call in today because he has been out 3 days now.  Please call when called in today

## 2023-11-15 RX ORDER — SACUBITRIL AND VALSARTAN 49; 51 MG/1; MG/1
1 TABLET, FILM COATED ORAL 2 TIMES DAILY
Qty: 180 TABLET | Refills: 3 | Status: SHIPPED | OUTPATIENT
Start: 2023-11-15

## 2023-11-16 NOTE — TELEPHONE ENCOUNTER
I called the pharmacy and they informed me the Rx was received, the patient was upset with them because it was not ready in the timely manner we was expecting.

## 2023-11-16 NOTE — TELEPHONE ENCOUNTER
Pt called and stated that she called the pharmacy yesterday and said that they still have no received the prescription, please call and advise. She did ask to please call her ASAP.

## 2023-11-16 NOTE — TELEPHONE ENCOUNTER
I informed the patient it appears this Rx was sent in yesterday. I tried to call the pharmacy but it is closed for lunch until 3:00. I will call back shortly.

## 2024-01-26 PROBLEM — I21.4 NSTEMI (NON-ST ELEVATED MYOCARDIAL INFARCTION) (HCC): Status: RESOLVED | Noted: 2021-09-24 | Resolved: 2024-01-26

## 2024-01-26 PROBLEM — I25.5 ISCHEMIC CARDIOMYOPATHY: Status: ACTIVE | Noted: 2022-03-15

## 2024-03-06 NOTE — TELEPHONE ENCOUNTER
Called and informed patient we had gotten his request sent to Dr. Fry. Advised to call a week before he runs out in the future because it is not good to run out of medications. Voiced understanding.    Requested Prescriptions     Pending Prescriptions Disp Refills    rivaroxaban (XARELTO) 20 MG TABS tablet 90 tablet 1     Sig: TAKE ONE TABLET BY MOUTH ONE TIME DAILY WITH DINNER     Rx verified last ov 7/26/23

## 2024-03-06 NOTE — TELEPHONE ENCOUNTER
MEDICATION REFILL REQUEST      Name of Medication:  xarelto   Dose:  20 mg  Frequency:  daily   Quantity:    Days' supply:  90      Pharmacy Name/Location:  [facility]/ Please call in today because he has been out for a few days now. Please call patient's wife to let her know this has been called in

## 2024-12-09 RX ORDER — SACUBITRIL AND VALSARTAN 49; 51 MG/1; MG/1
1 TABLET, FILM COATED ORAL 2 TIMES DAILY
Qty: 60 TABLET | Refills: 0 | Status: SHIPPED | OUTPATIENT
Start: 2024-12-09 | End: 2024-12-11 | Stop reason: SDUPTHER

## 2024-12-09 NOTE — TELEPHONE ENCOUNTER
Requested Prescriptions     Pending Prescriptions Disp Refills    ENTRESTO 49-51 MG per tablet [Pharmacy Med Name: ENTRESTO 49-51 MG TAB[**]] 60 tablet 0     Sig: TAKE ONE TABLET BY MOUTH TWICE A DAY     Called and scheduled appointment for patient and advised he must keep appointment for additional refills.

## 2024-12-10 NOTE — PROGRESS NOTES
MG per tablet; Take 1 tablet by mouth 2 times daily          IMPRESSION:    No angina, continue meds and lifestyle modification    BP at target, continue meds    Lipids at goal, continue meds    Great work on exercise, continue    A1C 7.3 is actually on its way down.         Return in about 1 year (around 12/11/2025).          Thank you for allowing me to participate in this patient's care.  Please call or contact me if there are any questions or concerns regarding the above.      CATRINA FLORES MD  12/11/24  12:21 PM

## 2024-12-11 ENCOUNTER — OFFICE VISIT (OUTPATIENT)
Age: 74
End: 2024-12-11

## 2024-12-11 VITALS
WEIGHT: 261 LBS | SYSTOLIC BLOOD PRESSURE: 134 MMHG | BODY MASS INDEX: 34.59 KG/M2 | DIASTOLIC BLOOD PRESSURE: 80 MMHG | HEIGHT: 73 IN | HEART RATE: 67 BPM

## 2024-12-11 DIAGNOSIS — I48.91 ATRIAL FIBRILLATION AND FLUTTER (HCC): ICD-10-CM

## 2024-12-11 DIAGNOSIS — I10 PRIMARY HYPERTENSION: Primary | ICD-10-CM

## 2024-12-11 DIAGNOSIS — I48.92 ATRIAL FIBRILLATION AND FLUTTER (HCC): ICD-10-CM

## 2024-12-11 DIAGNOSIS — I25.118 CORONARY ARTERY DISEASE OF NATIVE ARTERY OF NATIVE HEART WITH STABLE ANGINA PECTORIS (HCC): ICD-10-CM

## 2024-12-11 RX ORDER — SACUBITRIL AND VALSARTAN 49; 51 MG/1; MG/1
1 TABLET, FILM COATED ORAL 2 TIMES DAILY
Qty: 180 TABLET | Refills: 3 | Status: SHIPPED | OUTPATIENT
Start: 2024-12-11

## 2024-12-11 RX ORDER — ASPIRIN 81 MG/1
81 TABLET ORAL DAILY
COMMUNITY

## 2024-12-11 ASSESSMENT — ENCOUNTER SYMPTOMS: SHORTNESS OF BREATH: 0

## 2025-04-07 ENCOUNTER — TELEPHONE (OUTPATIENT)
Age: 75
End: 2025-04-07

## 2025-04-07 NOTE — TELEPHONE ENCOUNTER
MEDICATION REFILL REQUEST          Name of Medication:  xarelto  Dose:  20mg  Frequency:  1 times daily  Quantity:    Days' supply:            Pharmacy Name/Location:  [id number]  [address]   P [phone]    ~Thank You~

## 2025-04-07 NOTE — TELEPHONE ENCOUNTER
Requested Prescriptions     Pending Prescriptions Disp Refills    rivaroxaban (XARELTO) 20 MG TABS tablet 90 tablet 3     Sig: TAKE ONE TABLET BY MOUTH ONE TIME DAILY WITH DINNER     Rx verified last ov 12/11/24

## 2025-04-08 ENCOUNTER — TELEPHONE (OUTPATIENT)
Dept: CARDIOLOGY | Age: 75
End: 2025-04-08

## 2025-04-08 NOTE — TELEPHONE ENCOUNTER
Pt called requesting refill for Xarelto.  Send to preferred pharmacy.     Cole Iglesias, APRN - CNP

## 2025-05-16 ENCOUNTER — HOSPITAL ENCOUNTER (EMERGENCY)
Age: 75
Discharge: HOME | End: 2025-05-16
Attending: EMERGENCY MEDICINE
Payer: MEDICARE

## 2025-05-16 VITALS
HEART RATE: 95 BPM | RESPIRATION RATE: 16 BRPM | DIASTOLIC BLOOD PRESSURE: 64 MMHG | TEMPERATURE: 98.5 F | OXYGEN SATURATION: 96 % | SYSTOLIC BLOOD PRESSURE: 110 MMHG

## 2025-05-16 DIAGNOSIS — R19.7 DIARRHEA, UNSPECIFIED TYPE: Primary | ICD-10-CM

## 2025-05-16 LAB
ALBUMIN SERPL-MCNC: 3.7 G/DL (ref 3.2–4.6)
ALBUMIN/GLOB SERPL: 1.3 (ref 1–1.9)
ALP SERPL-CCNC: 64 U/L (ref 40–129)
ALT SERPL-CCNC: 27 U/L (ref 8–55)
ANION GAP SERPL CALC-SCNC: 13 MMOL/L (ref 7–16)
APPEARANCE UR: CLEAR
AST SERPL-CCNC: 43 U/L (ref 15–37)
BACTERIA URNS QL MICRO: NEGATIVE /HPF
BASOPHILS # BLD: 0.01 K/UL (ref 0–0.2)
BASOPHILS NFR BLD: 0.3 % (ref 0–2)
BILIRUB SERPL-MCNC: 0.3 MG/DL (ref 0–1.2)
BILIRUB UR QL: NEGATIVE
BUN SERPL-MCNC: 14 MG/DL (ref 8–23)
CALCIUM SERPL-MCNC: 8 MG/DL (ref 8.8–10.2)
CHLORIDE SERPL-SCNC: 106 MMOL/L (ref 98–107)
CO2 SERPL-SCNC: 18 MMOL/L (ref 20–29)
COLOR UR: ABNORMAL
CREAT SERPL-MCNC: 1.01 MG/DL (ref 0.8–1.3)
DIFFERENTIAL METHOD BLD: ABNORMAL
EOSINOPHIL # BLD: 0.1 K/UL (ref 0–0.8)
EOSINOPHIL NFR BLD: 2.9 % (ref 0.5–7.8)
EPI CELLS #/AREA URNS HPF: ABNORMAL /HPF
ERYTHROCYTE [DISTWIDTH] IN BLOOD BY AUTOMATED COUNT: 13.1 % (ref 11.9–14.6)
GLOBULIN SER CALC-MCNC: 2.8 G/DL (ref 2.3–3.5)
GLUCOSE SERPL-MCNC: 210 MG/DL (ref 70–99)
GLUCOSE UR STRIP.AUTO-MCNC: NEGATIVE MG/DL
HCT VFR BLD AUTO: 40.8 % (ref 41.1–50.3)
HGB BLD-MCNC: 13 G/DL (ref 13.6–17.2)
HGB UR QL STRIP: ABNORMAL
HYALINE CASTS URNS QL MICRO: ABNORMAL /LPF
IMM GRANULOCYTES # BLD AUTO: 0.02 K/UL (ref 0–0.5)
IMM GRANULOCYTES NFR BLD AUTO: 0.6 % (ref 0–5)
KETONES UR QL STRIP.AUTO: ABNORMAL MG/DL
LEUKOCYTE ESTERASE UR QL STRIP.AUTO: NEGATIVE
LIPASE SERPL-CCNC: 34 U/L (ref 13–60)
LYMPHOCYTES # BLD: 0.94 K/UL (ref 0.5–4.6)
LYMPHOCYTES NFR BLD: 27.7 % (ref 13–44)
MAGNESIUM SERPL-MCNC: 1.8 MG/DL (ref 1.8–2.4)
MCH RBC QN AUTO: 30 PG (ref 26.1–32.9)
MCHC RBC AUTO-ENTMCNC: 31.9 G/DL (ref 31.4–35)
MCV RBC AUTO: 94.2 FL (ref 82–102)
MONOCYTES # BLD: 0.53 K/UL (ref 0.1–1.3)
MONOCYTES NFR BLD: 15.6 % (ref 4–12)
NEUTS SEG # BLD: 1.79 K/UL (ref 1.7–8.2)
NEUTS SEG NFR BLD: 52.9 % (ref 43–78)
NITRITE UR QL STRIP.AUTO: NEGATIVE
NRBC # BLD: 0 K/UL (ref 0–0.2)
PH UR STRIP: 5.5 (ref 5–9)
PLATELET # BLD AUTO: 167 K/UL (ref 150–450)
PMV BLD AUTO: 9.9 FL (ref 9.4–12.3)
POTASSIUM SERPL-SCNC: 4.7 MMOL/L (ref 3.5–5.1)
PROT SERPL-MCNC: 6.4 G/DL (ref 6.3–8.2)
PROT UR STRIP-MCNC: 30 MG/DL
RBC # BLD AUTO: 4.33 M/UL (ref 4.23–5.6)
RBC #/AREA URNS HPF: ABNORMAL /HPF
SODIUM SERPL-SCNC: 137 MMOL/L (ref 136–145)
SP GR UR REFRACTOMETRY: 1.02 (ref 1–1.02)
UROBILINOGEN UR QL STRIP.AUTO: 0.2 EU/DL (ref 0.2–1)
WBC # BLD AUTO: 3.4 K/UL (ref 4.3–11.1)
WBC URNS QL MICRO: ABNORMAL /HPF

## 2025-05-16 PROCEDURE — 99283 EMERGENCY DEPT VISIT LOW MDM: CPT

## 2025-05-16 PROCEDURE — 81001 URINALYSIS AUTO W/SCOPE: CPT

## 2025-05-16 PROCEDURE — 80053 COMPREHEN METABOLIC PANEL: CPT

## 2025-05-16 PROCEDURE — 83690 ASSAY OF LIPASE: CPT

## 2025-05-16 PROCEDURE — 6370000000 HC RX 637 (ALT 250 FOR IP): Performed by: EMERGENCY MEDICINE

## 2025-05-16 PROCEDURE — 85025 COMPLETE CBC W/AUTO DIFF WBC: CPT

## 2025-05-16 PROCEDURE — 83735 ASSAY OF MAGNESIUM: CPT

## 2025-05-16 RX ORDER — DICYCLOMINE HYDROCHLORIDE 10 MG/1
10 CAPSULE ORAL
Status: COMPLETED | OUTPATIENT
Start: 2025-05-16 | End: 2025-05-16

## 2025-05-16 RX ORDER — DICYCLOMINE HYDROCHLORIDE 10 MG/1
10 CAPSULE ORAL 3 TIMES DAILY PRN
Qty: 20 CAPSULE | Refills: 0 | Status: SHIPPED | OUTPATIENT
Start: 2025-05-16

## 2025-05-16 RX ADMIN — DICYCLOMINE HYDROCHLORIDE 10 MG: 10 CAPSULE ORAL at 03:01

## 2025-05-16 NOTE — ED PROVIDER NOTES
Emergency Department Provider Note       [facility] EMERGENCY DEPT   PCP: Hoenicke, David, MD   Age: 75 y.o.   Sex: male     DISPOSITION Decision To Discharge 05/16/2025 02:54:45 AM    ICD-10-CM    1. Diarrhea, unspecified type  R19.7           Medical Decision Making     Patient comes to the ED for evaluation of diarrhea, ongoing for the past 3 days.  Patient denies any abdominal pain.  He denies recent antibiotic use.  Patient denies recent travel.  Patient denies weakness.  He denies F/C.  Patient states he did experience sweating while riding his bike, which is unusual for him.  On exam, patient nontoxic in appearance.  Vital signs stable.  Patient states he has used over-the-counter Kaopectate and Pepto-Bismol without improvement of symptoms.  Patient reports nausea, he denies any vomiting.    CBC without leukocytosis, stable H/H.  CMP unremarkable.  Mg wnl.  Lipase wnl.  Pt given Bentyl in ED.  Rx for home.  Stable for dc at this time, strict return precautions discussed.      1 acute, uncomplicated illness or injury.    Over the counter drug management performed.  Prescription drug management performed.  Shared medical decision making was utilized in creating the patients health plan today.    I independently ordered and reviewed each unique test.    I reviewed external records: provider visit note from PCP.  I reviewed external records: provider visit note from outside specialist.         History     Patient comes to the ED for evaluation of diarrhea, ongoing for the past 3 days.  Patient denies any abdominal pain.  He denies recent antibiotic use.  Patient denies recent travel.  Patient denies weakness.  He denies F/C.  Patient states he did experience sweating while riding his bike, which is unusual for him.  On exam, patient nontoxic in appearance.  Vital signs stable.  Patient states he has used over-the-counter Kaopectate and Pepto-Bismol without improvement of symptoms.  Patient reports nausea, he denies  mouth in the morning and at bedtime    METFORMIN (GLUCOPHAGE) 850 MG TABLET    Take 1 tablet by mouth 2 times daily (with meals)    METHOCARBAMOL (ROBAXIN-750) 750 MG TABLET    Take 1 tablet by mouth every 6 hours as needed for spasms.    METHOCARBAMOL (ROBAXIN-750) 750 MG TABLET    Take 1 tablet by mouth every 6 hours as needed for spasms.    METOPROLOL SUCCINATE (TOPROL XL) 50 MG EXTENDED RELEASE TABLET    Take 1 tablet by mouth nightly    ONDANSETRON (ZOFRAN) 4 MG TABLET    Take 1 tablet by mouth every 6 hours as needed for Nausea or Vomiting    OXYMETAZOLINE (NASAL RELIEF) 0.05 % NASAL SPRAY    2 sprays by Nasal route 2 times daily    POLYETHYLENE GLYCOL (GLYCOLAX) 17 GM/SCOOP POWDER    Take 17 g by mouth 2 times daily mix in 4-8oz of water    RIVAROXABAN (XARELTO) 20 MG TABS TABLET    TAKE ONE TABLET BY MOUTH ONE TIME DAILY WITH DINNER    ROSUVASTATIN (CRESTOR) 20 MG TABLET    TAKE ONE TABLET BY MOUTH EVERY NIGHT    SACUBITRIL-VALSARTAN (ENTRESTO) 49-51 MG PER TABLET    Take 1 tablet by mouth 2 times daily    SPIRONOLACTONE (ALDACTONE) 25 MG TABLET    Take 1 tablet by mouth at bedtime    TADALAFIL (CIALIS) 10 MG TABLET    Take 1 tablet by mouth at bedtime        Results from this emergency department visit:      Results for orders placed or performed during the hospital encounter of 05/16/25   CBC with Auto Differential   Result Value Ref Range    WBC 3.4 (L) 4.3 - 11.1 K/uL    RBC 4.33 4.23 - 5.6 M/uL    Hemoglobin 13.0 (L) 13.6 - 17.2 g/dL    Hematocrit 40.8 (L) 41.1 - 50.3 %    MCV 94.2 82.0 - 102.0 FL    MCH 30.0 26.1 - 32.9 PG    MCHC 31.9 31.4 - 35.0 g/dL    RDW 13.1 11.9 - 14.6 %    Platelets 167 150 - 450 K/uL    MPV 9.9 9.4 - 12.3 FL    nRBC 0.00 0.0 - 0.2 K/uL    Differential Type AUTOMATED      Neutrophils % 52.9 43.0 - 78.0 %    Lymphocytes % 27.7 13.0 - 44.0 %    Monocytes % 15.6 (H) 4.0 - 12.0 %    Eosinophils % 2.9 0.5 - 7.8 %    Basophils % 0.3 0.0 - 2.0 %    Immature Granulocytes % 0.6 0.0 -

## 2025-05-16 NOTE — DISCHARGE INSTRUCTIONS
If you pass out, develop a fever, or if you have any other concerning symptoms, please return to the ER immediately.

## 2025-05-16 NOTE — ED NOTES
I have reviewed discharge instructions with the patient.  The patient verbalized understanding.    Patient left ED via Discharge Method: ambulatory to Home with self.    Opportunity for questions and clarification provided.       Patient given 1 scripts.         To continue your aftercare when you leave the hospital, you may receive an automated call from our care team to check in on how you are doing.  This is a free service and part of our promise to provide the best care and service to meet your aftercare needs.” If you have questions, or wish to unsubscribe from this service please call [phone].  Thank you for Choosing our [facility] Emergency Department.

## (undated) DEVICE — SOLUTION IRRIG 1000ML LAC RINGER PLAS POUR BTL

## (undated) DEVICE — DRAPE EQUIP ROBOT STRL VELYS 20/PK ORDER IN MULTIIPLES OF 20 EACH

## (undated) DEVICE — STERILE PRESSURE PROTECTOR PAD® FOR DE MAYO UNIVERSAL DISTRACTOR® (10/CASE): Brand: DE MAYO UNIVERSAL DISTRACTOR®

## (undated) DEVICE — CATHETER THOR 32FR L23IN PVC 5 EYELET STR ATRAUM

## (undated) DEVICE — SUTURE ETHBND EXCEL 2-0 L30IN NONABSORBABLE GRN L26MM SH MX563

## (undated) DEVICE — CABG DR DENNIS: Brand: MEDLINE INDUSTRIES, INC.

## (undated) DEVICE — STERILE HOOK LOCK LATEX FREE ELASTIC BANDAGE 6INX5YD: Brand: HOOK LOCK™

## (undated) DEVICE — PRESSURE MONITORING SET: Brand: TRUWAVE, VAMP PLUS

## (undated) DEVICE — BLADE SAW W10XL54MM FOR PRI REPEAT STRNOTMY

## (undated) DEVICE — SOLUTION IRRIG 1000ML 09% SOD CHL USP PIC PLAS CONTAINER

## (undated) DEVICE — SUTURE PERMAHAND SZ 2-0 L12X18IN NONABSORBABLE BLK SILK A185H

## (undated) DEVICE — DRESSING HYDROFIBER AQUACEL AG ADVANTAGE 3.5X12 IN

## (undated) DEVICE — SUTURE SILK PERMAHAND PRECUT 6 X 30 IN SZ 1 BLK BRAID A307H

## (undated) DEVICE — 3000CC GUARDIAN II: Brand: GUARDIAN

## (undated) DEVICE — SPONGE LAP 18X18IN STRL -- 5/PK

## (undated) DEVICE — GLIDESHEATH SLENDER STAINLESS STEEL KIT: Brand: GLIDESHEATH SLENDER

## (undated) DEVICE — RADIFOCUS OPTITORQUE ANGIOGRAPHIC CATHETER: Brand: OPTITORQUE

## (undated) DEVICE — SUTURE COAT VCRL SZ 0 L36IN ABSRB VLT CTX L48MM TAPERPOINT J370H

## (undated) DEVICE — DRESSING HYDROFIBER AQUACEL AG ADVANTAGE 3.5X14 IN

## (undated) DEVICE — CANNULA PERF L1.8MM TIP L1MM S STL SHFT BLB SHP TIP FEM

## (undated) DEVICE — BUTTON SWITCH PENCIL BLADE ELECTRODE, HOLSTER: Brand: EDGE

## (undated) DEVICE — PAD,NON-ADHERENT,3X8,STERILE,LF,1/PK: Brand: MEDLINE

## (undated) DEVICE — AORTIC PUNCHES ARE USED TO CREATE A UNIFORM OPENING IN BLOOD VESSELS DURING CARDIOVASCULAR SURGERY. THE VESSEL GRAFT IS INSERTED INTO THE CREATED OPENING AND SUTURED TO THE VESSEL WALL. AORTIC LANCETS ARE USED TO MAKE A SMALL UNIFORM CUT IN A BLOOD VESSEL TO FACILITATE INSERTION OF AN AORTIC PUNCH.  PUNCHES COME IN VARIOUS LENGTHS, DIAMETERS AND TIP CONFIGURATIONS.: Brand: CLEANCUT ROTATING AORTIC PUNCH

## (undated) DEVICE — Device

## (undated) DEVICE — BASIC SINGLE BASIN-LF: Brand: MEDLINE INDUSTRIES, INC.

## (undated) DEVICE — SUTURE NONABSORBABLE L24IN SZ 7-0 M0-5 BV175-8 EP 24 BLU M8745

## (undated) DEVICE — SUTURE PERMAHAND SZ 0 L30IN NONABSORBABLE BLK L30MM PSL 3/8 590H

## (undated) DEVICE — CLOSURE SKIN FACILITATES COMPATIBILITY W/ CERTAIN IS DSG

## (undated) DEVICE — STRAP RESTRAIN W3.5XL19IN TECLIN STRRP POS LEG DURING LITH

## (undated) DEVICE — Device: Brand: JELCO

## (undated) DEVICE — SUTURE ETHBND EXCEL SZ 0 L18IN NONABSORBABLE GRN L36MM CT-1 CX21D

## (undated) DEVICE — SOLUTION IRRIG 3000ML 0.9% SOD CHL USP UROMATIC PLAS CONT

## (undated) DEVICE — SOLUTION IRRIG 1000ML 0.9% SOD CHL USP POUR PLAS BTL

## (undated) DEVICE — SUTURE VCRL SZ 3-0 L27IN ABSRB UD L24MM PS-1 3/8 CIR PRIM J936H

## (undated) DEVICE — 48" PROBE COVER W/GEL, ULTRASOUND, STERILE: Brand: SITE-RITE

## (undated) DEVICE — SUTURE PROL SZ 7-0 L24IN NONABSORBABLE BLU L9.3MM BV-1 3/8 M8702

## (undated) DEVICE — APPLIER CLP L9.375IN APER 2.1MM CLS L3.8MM 20 SM TI CLP

## (undated) DEVICE — DRAPE EQUIP SATELLITE STN STRL VELYS

## (undated) DEVICE — SUT PROL 4-0 36IN RB1 DA BLU --

## (undated) DEVICE — SUTURE VCRL SZ 1 L27IN ABSRB UD L36MM CP-1 1/2 CIR REV CUT J268H

## (undated) DEVICE — CANNULA INJ L2.5IN BLNT TIP 3MM CLR BODY W/ 1 W VLV DLP

## (undated) DEVICE — BIPOLAR SEALER 23-112-1 AQM 6.0: Brand: AQUAMANTYS ®

## (undated) DEVICE — OCCLUDER CV VES 1.25X12 MM CORONARY INT SIL STRL FLO RST

## (undated) DEVICE — DRAPE,TOP,102X53,STERILE: Brand: MEDLINE

## (undated) DEVICE — BLADE SCALP SURG BARD-PARK 10 --

## (undated) DEVICE — AMD ANTIMICROBIAL GAUZE SPONGES,12 PLY USP TYPE VII, 0.2% POLYHEXAMETHYLENE BIGUANIDE HCI (PHMB): Brand: CURITY

## (undated) DEVICE — DRAPE SLUSH DISC W44XL66IN ST FOR RND BSIN HUSH SLUSH SYS

## (undated) DEVICE — SOLUTION IRRIG 3000ML 0.9% SOD CHL FLX CONT 0797208] ICU MEDICAL INC]

## (undated) DEVICE — SUTURE NONABSORBABLE MONOFILAMENT 5-0 C-1 1X24 IN PROLENE 8725H

## (undated) DEVICE — BAND RADIAL COMPR ARTERY 27CM -- LNG BX/10

## (undated) DEVICE — APPLIER CLP L9.38IN M LIG TI DISP STR RNG HNDL LIGACLP

## (undated) DEVICE — MEDI-TRACE CADENCE ADULT, DEFIBRILLATION ELECTRODE -RTS  (10 PR/PK) - ZOLL: Brand: MEDI-TRACE CADENCE

## (undated) DEVICE — SUTURE PERMAHAND SZ 2-0 L30IN NONABSORBABLE BLK L17MM BB K883H

## (undated) DEVICE — CONNECTOR IV 3/8X3/8X3/8 Y

## (undated) DEVICE — STERILE HOOK LOCK LATEX FREE ELASTIC BANDAGE 4INX5YD: Brand: HOOK LOCK™

## (undated) DEVICE — PLEDGET VASC W3/16XL3/8IN THK1/16IN PTFE SFT

## (undated) DEVICE — 3M™ IOBAN™ 2 ANTIMICROBIAL INCISE DRAPE 6648EZ: Brand: IOBAN™ 2

## (undated) DEVICE — BLADE OPHTH 3MM CUT EDGE NDL

## (undated) DEVICE — KIT TRK KNEE PROC VIZADISC

## (undated) DEVICE — SUTURE S STL SZ 5 L18IN NONABSORBABLE SIL CCS L48MM 1/2 CIR M653G

## (undated) DEVICE — REM POLYHESIVE ADULT PATIENT RETURN ELECTRODE: Brand: VALLEYLAB

## (undated) DEVICE — YANKAUER,FLEXIBLE HANDLE,REGLR CAPACITY: Brand: MEDLINE INDUSTRIES, INC.

## (undated) DEVICE — SUT SLK 0 30IN CT1 BLK --

## (undated) DEVICE — CLAMP INSERT: Brand: STEALTH® CLAMP INSERT

## (undated) DEVICE — APPLIER RMFG CLP LIG MED 23.8 --

## (undated) DEVICE — CATHETER DIAG AD 5FR L110CM 145DEG COR GRY HYDRPHLC NYL

## (undated) DEVICE — CATHETER URETH ALL PURP 14 FR RND HLLW TIP INTERMITTENT LTX

## (undated) DEVICE — KIT DRP FOR RIO ROBOTIC ARM ASST SYS

## (undated) DEVICE — SUTURE STRATAFIX SPRL SZ 1 L14IN ABSRB VLT L48CM CTX 1/2 SXPD2B405

## (undated) DEVICE — TOTAL KNEE DR RIDGEWAY: Brand: MEDLINE INDUSTRIES, INC.

## (undated) DEVICE — DUAL CUT SAGITTAL BLADE

## (undated) DEVICE — BLADE SAW W12.5XL70MM THK0.8MM CUT THK1.12MM S STL RECIP

## (undated) DEVICE — SUTURE ETHBND EXCEL SZ 3-0 L36IN NONABSORBABLE GRN RB-1 X558H

## (undated) DEVICE — STRIP,CLOSURE,WOUND,MEDI-STRIP,1/2X4: Brand: MEDLINE

## (undated) DEVICE — STERILE PVP: Brand: MEDLINE INDUSTRIES, INC.

## (undated) DEVICE — BANDAGE,GAUZE,BULKEE II,4.5"X4.1YD,STRL: Brand: MEDLINE

## (undated) DEVICE — PIN DRL 4X125 MM ROBOTIC-ASSISTED SOLUTION ARRY VELYS

## (undated) DEVICE — GUIDEWIRE 035IN 210CM PTFE COAT FIX COR J TIP 15MM FIRM BODY

## (undated) DEVICE — APPLIER RMFG CLP LIG SM 9IN --

## (undated) DEVICE — SOLUTION IV 250ML 0.9% SOD CHL PH 5 INJ USP VIAFLX PLAS

## (undated) DEVICE — BLADE SCALP SURG BARD-PARK 11 --

## (undated) DEVICE — SUTURE PROL SZ 6-0 L30IN NONABSORBABLE BLU L13MM CC-1 3/8 M8707

## (undated) DEVICE — OCCLUDER CV VES 1.5X12 MM CORONARY INT SIL STRL FLO RST

## (undated) DEVICE — BLADE RMFG SAG STRYKR 19.5X88X --

## (undated) DEVICE — CATHETER ETER TY TEMP SENS F MBO W URIN M FOLLOWS CDC GUIDELINES TO

## (undated) DEVICE — SYRINGE MED 50ML LUERLOCK TIP

## (undated) DEVICE — KIT INT FIX FEM TIB CKPT MAKOPLASTY

## (undated) DEVICE — BLADE SAW OSCILLATING 85X19X2 MM ROBOTIC-ASSISTED VELYS

## (undated) DEVICE — WAX SURG 2.5GM HEMSTAT BNE BEESWAX PARAFFIN ISO PALMITATE

## (undated) DEVICE — BLADE SURG NO20 S STL STR DISP GLASSVAN

## (undated) DEVICE — PREP SKN CHLRAPRP APL 26ML STR --

## (undated) DEVICE — CATHETER THOR 32FR L23IN PVC 6 EYELET STR ATRAUM

## (undated) DEVICE — SYS VSL HARV HEMOPRO2 VASOVIEW -- HARV SYS MINIMUM ORDER 5/EA

## (undated) DEVICE — BLADE ELECTRODE: Brand: EDGE

## (undated) DEVICE — SOLUTION IRRIG 1000ML STRL H2O USP PLAS POUR BTL

## (undated) DEVICE — SUTURE VCRL SZ 2-0 L27IN ABSRB UD L36MM CP-1 1/2 CIR REV J266H

## (undated) DEVICE — SUTURE VCRL 3-0 L36IN ABSRB UD CT L40MM 1/2 CIR TAPERPOINT J956H